# Patient Record
Sex: MALE | Race: WHITE | HISPANIC OR LATINO | Employment: FULL TIME | URBAN - METROPOLITAN AREA
[De-identification: names, ages, dates, MRNs, and addresses within clinical notes are randomized per-mention and may not be internally consistent; named-entity substitution may affect disease eponyms.]

---

## 2017-01-03 ENCOUNTER — TELEPHONE (OUTPATIENT)
Dept: ORTHOPEDICS | Facility: CLINIC | Age: 57
End: 2017-01-03

## 2017-01-04 ENCOUNTER — ANESTHESIA (OUTPATIENT)
Dept: SURGERY | Facility: HOSPITAL | Age: 57
DRG: 462 | End: 2017-01-04
Payer: COMMERCIAL

## 2017-01-04 PROBLEM — M17.11 PRIMARY OSTEOARTHRITIS OF RIGHT KNEE: Status: ACTIVE | Noted: 2017-01-04

## 2017-01-04 PROCEDURE — D9220A PRA ANESTHESIA: Mod: CRNA,,, | Performed by: NURSE ANESTHETIST, CERTIFIED REGISTERED

## 2017-01-04 PROCEDURE — 63600175 PHARM REV CODE 636 W HCPCS: Performed by: NURSE ANESTHETIST, CERTIFIED REGISTERED

## 2017-01-04 PROCEDURE — 27800517 HC TRAY,EPIDURAL-CONTINUOUS: Performed by: ANESTHESIOLOGY

## 2017-01-04 PROCEDURE — 76942 ECHO GUIDE FOR BIOPSY: CPT | Performed by: ANESTHESIOLOGY

## 2017-01-04 PROCEDURE — D9220A PRA ANESTHESIA: Mod: ANES,,, | Performed by: ANESTHESIOLOGY

## 2017-01-04 PROCEDURE — 25000003 PHARM REV CODE 250: Performed by: NURSE PRACTITIONER

## 2017-01-04 PROCEDURE — 64450 NJX AA&/STRD OTHER PN/BRANCH: CPT | Mod: 59,50,, | Performed by: ANESTHESIOLOGY

## 2017-01-04 PROCEDURE — 64448 NJX AA&/STRD FEM NRV NFS IMG: CPT | Mod: 59,50,, | Performed by: ANESTHESIOLOGY

## 2017-01-04 PROCEDURE — 76942 ECHO GUIDE FOR BIOPSY: CPT | Mod: 26,,, | Performed by: ANESTHESIOLOGY

## 2017-01-04 PROCEDURE — 27200750 HC INSULATED NEEDLE/ STIMUPLEX: Performed by: ANESTHESIOLOGY

## 2017-01-04 PROCEDURE — 25000003 PHARM REV CODE 250: Performed by: NURSE ANESTHETIST, CERTIFIED REGISTERED

## 2017-01-04 RX ORDER — FENTANYL CITRATE 50 UG/ML
INJECTION, SOLUTION INTRAMUSCULAR; INTRAVENOUS
Status: DISCONTINUED | OUTPATIENT
Start: 2017-01-04 | End: 2017-01-04

## 2017-01-04 RX ORDER — LIDOCAINE HYDROCHLORIDE AND EPINEPHRINE 15; 5 MG/ML; UG/ML
INJECTION, SOLUTION EPIDURAL
Status: DISCONTINUED | OUTPATIENT
Start: 2017-01-04 | End: 2017-01-04

## 2017-01-04 RX ORDER — MIDAZOLAM HYDROCHLORIDE 1 MG/ML
INJECTION, SOLUTION INTRAMUSCULAR; INTRAVENOUS
Status: DISCONTINUED | OUTPATIENT
Start: 2017-01-04 | End: 2017-01-04

## 2017-01-04 RX ORDER — KETAMINE HCL IN 0.9 % NACL 50 MG/5 ML
SYRINGE (ML) INTRAVENOUS
Status: DISCONTINUED | OUTPATIENT
Start: 2017-01-04 | End: 2017-01-04

## 2017-01-04 RX ORDER — PROPOFOL 10 MG/ML
VIAL (ML) INTRAVENOUS
Status: DISCONTINUED | OUTPATIENT
Start: 2017-01-04 | End: 2017-01-04

## 2017-01-04 RX ORDER — PROPOFOL 10 MG/ML
VIAL (ML) INTRAVENOUS CONTINUOUS PRN
Status: DISCONTINUED | OUTPATIENT
Start: 2017-01-04 | End: 2017-01-04

## 2017-01-04 RX ORDER — ONDANSETRON 2 MG/ML
INJECTION INTRAMUSCULAR; INTRAVENOUS
Status: DISCONTINUED | OUTPATIENT
Start: 2017-01-04 | End: 2017-01-04

## 2017-01-04 RX ORDER — DEXAMETHASONE SODIUM PHOSPHATE 4 MG/ML
INJECTION, SOLUTION INTRA-ARTICULAR; INTRALESIONAL; INTRAMUSCULAR; INTRAVENOUS; SOFT TISSUE
Status: DISCONTINUED | OUTPATIENT
Start: 2017-01-04 | End: 2017-01-04

## 2017-01-04 RX ORDER — SODIUM CHLORIDE 9 MG/ML
INJECTION, SOLUTION INTRAVENOUS CONTINUOUS PRN
Status: DISCONTINUED | OUTPATIENT
Start: 2017-01-04 | End: 2017-01-04

## 2017-01-04 RX ORDER — LIDOCAINE HCL/PF 100 MG/5ML
SYRINGE (ML) INTRAVENOUS
Status: DISCONTINUED | OUTPATIENT
Start: 2017-01-04 | End: 2017-01-04

## 2017-01-04 RX ADMIN — SODIUM CHLORIDE, SODIUM GLUCONATE, SODIUM ACETATE, POTASSIUM CHLORIDE, MAGNESIUM CHLORIDE, SODIUM PHOSPHATE, DIBASIC, AND POTASSIUM PHOSPHATE: .53; .5; .37; .037; .03; .012; .00082 INJECTION, SOLUTION INTRAVENOUS at 02:01

## 2017-01-04 RX ADMIN — Medication 30 MG: at 01:01

## 2017-01-04 RX ADMIN — SODIUM CHLORIDE, SODIUM ACETATE ANHYDROUS, SODIUM GLUCONATE, POTASSIUM CHLORIDE, AND MAGNESIUM CHLORIDE: 526; 222; 502; 37; 30 INJECTION, SOLUTION INTRAVENOUS at 04:01

## 2017-01-04 RX ADMIN — MEPIVACAINE HYDROCHLORIDE 45 MG: 15 INJECTION, SOLUTION EPIDURAL; INFILTRATION at 01:01

## 2017-01-04 RX ADMIN — PROPOFOL 50 MG: 10 INJECTION, EMULSION INTRAVENOUS at 01:01

## 2017-01-04 RX ADMIN — SODIUM CHLORIDE, SODIUM GLUCONATE, SODIUM ACETATE, POTASSIUM CHLORIDE, MAGNESIUM CHLORIDE, SODIUM PHOSPHATE, DIBASIC, AND POTASSIUM PHOSPHATE: .53; .5; .37; .037; .03; .012; .00082 INJECTION, SOLUTION INTRAVENOUS at 01:01

## 2017-01-04 RX ADMIN — ONDANSETRON 4 MG: 2 INJECTION INTRAMUSCULAR; INTRAVENOUS at 01:01

## 2017-01-04 RX ADMIN — LIDOCAINE HYDROCHLORIDE AND EPINEPHRINE 3 ML: 15; 5 INJECTION, SOLUTION EPIDURAL at 03:01

## 2017-01-04 RX ADMIN — PROPOFOL 75 MCG/KG/MIN: 10 INJECTION, EMULSION INTRAVENOUS at 01:01

## 2017-01-04 RX ADMIN — Medication 2 G: at 01:01

## 2017-01-04 RX ADMIN — MEPIVACAINE HYDROCHLORIDE 6 ML/HR: 15 INJECTION, SOLUTION EPIDURAL; INFILTRATION at 03:01

## 2017-01-04 RX ADMIN — SODIUM CHLORIDE, SODIUM ACETATE ANHYDROUS, SODIUM GLUCONATE, POTASSIUM CHLORIDE, AND MAGNESIUM CHLORIDE: 526; 222; 502; 37; 30 INJECTION, SOLUTION INTRAVENOUS at 03:01

## 2017-01-04 RX ADMIN — EPHEDRINE SULFATE 5 MG: 50 INJECTION, SOLUTION INTRAMUSCULAR; INTRAVENOUS; SUBCUTANEOUS at 03:01

## 2017-01-04 RX ADMIN — LIDOCAINE HYDROCHLORIDE 50 MG: 20 INJECTION, SOLUTION INTRAVENOUS at 01:01

## 2017-01-04 RX ADMIN — SODIUM CHLORIDE: 0.9 INJECTION, SOLUTION INTRAVENOUS at 01:01

## 2017-01-04 RX ADMIN — MIDAZOLAM HYDROCHLORIDE 2 MG: 1 INJECTION, SOLUTION INTRAMUSCULAR; INTRAVENOUS at 01:01

## 2017-01-04 RX ADMIN — MIDAZOLAM HYDROCHLORIDE 1 MG: 1 INJECTION, SOLUTION INTRAMUSCULAR; INTRAVENOUS at 02:01

## 2017-01-04 RX ADMIN — FENTANYL CITRATE 50 MCG: 50 INJECTION, SOLUTION INTRAMUSCULAR; INTRAVENOUS at 01:01

## 2017-01-04 RX ADMIN — DEXAMETHASONE SODIUM PHOSPHATE 8 MG: 4 INJECTION, SOLUTION INTRAMUSCULAR; INTRAVENOUS at 01:01

## 2017-01-05 PROCEDURE — 99231 SBSQ HOSP IP/OBS SF/LOW 25: CPT | Mod: ,,, | Performed by: ANESTHESIOLOGY

## 2017-01-06 ENCOUNTER — HOSPITAL ENCOUNTER (INPATIENT)
Facility: HOSPITAL | Age: 57
LOS: 11 days | Discharge: HOME HEALTH | DRG: 561 | End: 2017-01-17
Attending: PHYSICAL MEDICINE & REHABILITATION | Admitting: PHYSICAL MEDICINE & REHABILITATION
Payer: COMMERCIAL

## 2017-01-06 DIAGNOSIS — Z96.653 S/P BILATERAL UNICOMPARTMENTAL KNEE REPLACEMENT: Primary | ICD-10-CM

## 2017-01-06 DIAGNOSIS — Z98.890 S/P ARTHROSCOPY OF KNEE: ICD-10-CM

## 2017-01-06 DIAGNOSIS — M17.12 PRIMARY OSTEOARTHRITIS OF LEFT KNEE: ICD-10-CM

## 2017-01-06 DIAGNOSIS — M17.11 PRIMARY OSTEOARTHRITIS OF RIGHT KNEE: ICD-10-CM

## 2017-01-06 PROBLEM — I10 BENIGN ESSENTIAL HTN: Status: ACTIVE | Noted: 2017-01-06

## 2017-01-06 PROCEDURE — 25000003 PHARM REV CODE 250: Performed by: STUDENT IN AN ORGANIZED HEALTH CARE EDUCATION/TRAINING PROGRAM

## 2017-01-06 PROCEDURE — 99222 1ST HOSP IP/OBS MODERATE 55: CPT | Mod: ,,, | Performed by: PHYSICAL MEDICINE & REHABILITATION

## 2017-01-06 PROCEDURE — 12800000 HC REHAB SEMI-PRIVATE ROOM

## 2017-01-06 PROCEDURE — 99231 SBSQ HOSP IP/OBS SF/LOW 25: CPT | Mod: ,,, | Performed by: ANESTHESIOLOGY

## 2017-01-06 RX ORDER — LEVOTHYROXINE SODIUM 75 UG/1
150 TABLET ORAL
Status: DISCONTINUED | OUTPATIENT
Start: 2017-01-07 | End: 2017-01-17 | Stop reason: HOSPADM

## 2017-01-06 RX ORDER — OXYCODONE HYDROCHLORIDE 5 MG/1
10 TABLET ORAL
Status: DISCONTINUED | OUTPATIENT
Start: 2017-01-06 | End: 2017-01-17 | Stop reason: HOSPADM

## 2017-01-06 RX ORDER — RAMELTEON 8 MG/1
8 TABLET ORAL NIGHTLY PRN
Status: DISCONTINUED | OUTPATIENT
Start: 2017-01-06 | End: 2017-01-10

## 2017-01-06 RX ORDER — ASPIRIN 325 MG
325 TABLET, DELAYED RELEASE (ENTERIC COATED) ORAL 2 TIMES DAILY
Status: DISCONTINUED | OUTPATIENT
Start: 2017-01-06 | End: 2017-01-17 | Stop reason: HOSPADM

## 2017-01-06 RX ORDER — HYDRALAZINE HYDROCHLORIDE 25 MG/1
75 TABLET, FILM COATED ORAL 3 TIMES DAILY
Status: COMPLETED | OUTPATIENT
Start: 2017-01-06 | End: 2017-01-10

## 2017-01-06 RX ORDER — MORPHINE SULFATE 2 MG/ML
2 INJECTION, SOLUTION INTRAMUSCULAR; INTRAVENOUS
Status: DISCONTINUED | OUTPATIENT
Start: 2017-01-06 | End: 2017-01-06 | Stop reason: HOSPADM

## 2017-01-06 RX ORDER — POLYETHYLENE GLYCOL 3350 17 G/17G
17 POWDER, FOR SOLUTION ORAL DAILY
Status: DISCONTINUED | OUTPATIENT
Start: 2017-01-07 | End: 2017-01-17 | Stop reason: HOSPADM

## 2017-01-06 RX ORDER — SIMVASTATIN 20 MG/1
20 TABLET, FILM COATED ORAL NIGHTLY
Status: DISCONTINUED | OUTPATIENT
Start: 2017-01-06 | End: 2017-01-17 | Stop reason: HOSPADM

## 2017-01-06 RX ORDER — ONDANSETRON 8 MG/1
8 TABLET, ORALLY DISINTEGRATING ORAL EVERY 6 HOURS PRN
Status: DISCONTINUED | OUTPATIENT
Start: 2017-01-06 | End: 2017-01-17 | Stop reason: HOSPADM

## 2017-01-06 RX ORDER — POLYETHYLENE GLYCOL 3350 17 G/17G
17 POWDER, FOR SOLUTION ORAL EVERY 12 HOURS PRN
Status: DISCONTINUED | OUTPATIENT
Start: 2017-01-06 | End: 2017-01-17 | Stop reason: HOSPADM

## 2017-01-06 RX ORDER — OXYCODONE HYDROCHLORIDE 5 MG/1
5 TABLET ORAL
Status: DISCONTINUED | OUTPATIENT
Start: 2017-01-06 | End: 2017-01-17 | Stop reason: HOSPADM

## 2017-01-06 RX ORDER — NIFEDIPINE 30 MG/1
60 TABLET, EXTENDED RELEASE ORAL DAILY
Status: DISCONTINUED | OUTPATIENT
Start: 2017-01-07 | End: 2017-01-17 | Stop reason: HOSPADM

## 2017-01-06 RX ORDER — MAG HYDROX/ALUMINUM HYD/SIMETH 200-200-20
15 SUSPENSION, ORAL (FINAL DOSE FORM) ORAL EVERY 6 HOURS PRN
Status: DISCONTINUED | OUTPATIENT
Start: 2017-01-06 | End: 2017-01-17 | Stop reason: HOSPADM

## 2017-01-06 RX ORDER — ADHESIVE BANDAGE
30 BANDAGE TOPICAL DAILY PRN
Status: DISCONTINUED | OUTPATIENT
Start: 2017-01-06 | End: 2017-01-17 | Stop reason: HOSPADM

## 2017-01-06 RX ORDER — ACETAMINOPHEN 325 MG/1
650 TABLET ORAL EVERY 6 HOURS PRN
Status: DISCONTINUED | OUTPATIENT
Start: 2017-01-06 | End: 2017-01-17 | Stop reason: HOSPADM

## 2017-01-06 RX ORDER — AMOXICILLIN 250 MG
1 CAPSULE ORAL 2 TIMES DAILY
Status: DISCONTINUED | OUTPATIENT
Start: 2017-01-06 | End: 2017-01-17 | Stop reason: HOSPADM

## 2017-01-06 RX ORDER — PREGABALIN 75 MG/1
75 CAPSULE ORAL NIGHTLY
Status: DISCONTINUED | OUTPATIENT
Start: 2017-01-06 | End: 2017-01-17 | Stop reason: HOSPADM

## 2017-01-06 RX ORDER — FAMOTIDINE 20 MG/1
20 TABLET, FILM COATED ORAL 2 TIMES DAILY
Status: DISCONTINUED | OUTPATIENT
Start: 2017-01-06 | End: 2017-01-17 | Stop reason: HOSPADM

## 2017-01-06 RX ORDER — BISACODYL 10 MG
10 SUPPOSITORY, RECTAL RECTAL DAILY PRN
Status: DISCONTINUED | OUTPATIENT
Start: 2017-01-06 | End: 2017-01-17 | Stop reason: HOSPADM

## 2017-01-06 RX ORDER — MORPHINE SULFATE 2 MG/ML
2 INJECTION, SOLUTION INTRAMUSCULAR; INTRAVENOUS EVERY 4 HOURS PRN
Status: DISCONTINUED | OUTPATIENT
Start: 2017-01-06 | End: 2017-01-06 | Stop reason: HOSPADM

## 2017-01-06 RX ORDER — OXYCODONE HYDROCHLORIDE 5 MG/1
15 TABLET ORAL
Status: DISCONTINUED | OUTPATIENT
Start: 2017-01-06 | End: 2017-01-17 | Stop reason: HOSPADM

## 2017-01-06 RX ORDER — NALOXONE HCL 0.4 MG/ML
0.02 VIAL (ML) INJECTION
Status: DISCONTINUED | OUTPATIENT
Start: 2017-01-06 | End: 2017-01-17 | Stop reason: HOSPADM

## 2017-01-06 RX ADMIN — HYDRALAZINE HYDROCHLORIDE 75 MG: 25 TABLET ORAL at 09:01

## 2017-01-06 RX ADMIN — PREGABALIN 75 MG: 75 CAPSULE ORAL at 08:01

## 2017-01-06 RX ADMIN — OXYCODONE HYDROCHLORIDE 10 MG: 5 TABLET ORAL at 05:01

## 2017-01-06 RX ADMIN — STANDARDIZED SENNA CONCENTRATE AND DOCUSATE SODIUM 1 TABLET: 8.6; 5 TABLET, FILM COATED ORAL at 08:01

## 2017-01-06 RX ADMIN — SIMVASTATIN 20 MG: 20 TABLET, FILM COATED ORAL at 08:01

## 2017-01-06 RX ADMIN — FAMOTIDINE 20 MG: 20 TABLET, FILM COATED ORAL at 08:01

## 2017-01-06 RX ADMIN — ASPIRIN 325 MG: 325 TABLET, DELAYED RELEASE ORAL at 08:01

## 2017-01-06 NOTE — IP AVS SNAPSHOT
[place] [address] [place] [address]  Phone: [phone]           I have received a copy of my After Visit Summary and discharge instructions from [facility].    INSTRUCTIONS RECEIVED AND UNDERSTOOD BY:                     Patient/Patient Representative: ________________________________________________________________     Date/Time: ________________________________________________________________                     Instructions Given By: ________________________________________________________________     Date/Time: ________________________________________________________________

## 2017-01-06 NOTE — PLAN OF CARE
Problem: Patient Care Overview  Goal: Plan of Care Review  Outcome: Ongoing (interventions implemented as appropriate)  Reviewed POC    Problem: Fall Risk (Adult)  Goal: Absence of Falls  Patient will demonstrate the desired outcomes by discharge/transition of care.   Outcome: Ongoing (interventions implemented as appropriate)  Calls for assistance    Problem: Pain, Acute (Adult)  Goal: Acceptable Pain Control/Comfort Level  Patient will demonstrate the desired outcomes by discharge/transition of care.  Outcome: Ongoing (interventions implemented as appropriate)  Pain well controlled with medication

## 2017-01-06 NOTE — H&P
"[facility]  History & Physical    Subjective:      Doug Garibay is a 56-year-old male with PMHx of HTN, HLD, JUAN RAMON, arthritis, gout, and hypothyroidism. Patient presented to ortho clinic on 12/28/16 for bilateral knee pain unrelieved by conservative/non-operative measures. Decision was made to undergo bilateral knee replacements. Admitted [place] [facility] [place] 1/4/17 for bilateral TKAs done on the day of admission.  The pt's postop course was cx by impaired mobility and ability to perform ADLs including sit-->stand MXA, T/Fs MXA, gait 8' MDA, UB dressing MDA, LB dressing MXA.  He was admitted to [facility] to address the mobility and ADL impairments.  His CC is "walking".      Past Medical History   Diagnosis Date    Arthritis     Gout     High cholesterol     Hypertension     Hypothyroidism     Thyroid disease      Past Surgical History   Procedure Laterality Date    Thyroidectomy       1990's    Parathyroidectomy       not sure of date, was done here    Tonsillectomy       Family History   Problem Relation Age of Onset    Stroke Mother     Hyperlipidemia Mother     Hypertension Mother     Stroke Father     Hyperlipidemia Father     Hypertension Father      Social History   Substance Use Topics    Smoking status: Never Smoker    Smokeless tobacco: Never Used    Alcohol use No       PTA Medications   Medication Sig    acetaminophen (TYLENOL) 500 MG tablet Take 500 mg by mouth every 6 (six) hours as needed for Pain.    aspirin (ECOTRIN) 81 MG EC tablet Take 81 mg by mouth once daily.    aspirin 325 MG tablet Take 1 tablet (325 mg total) by mouth 2 (two) times daily.    cholecalciferol, vitamin D3, (VITAMIN D3) 5,000 unit Tab Take 5,000 Units by mouth once daily.    docusate sodium 100 mg capsule Take 100 mg by mouth 2 (two) times daily.    ferrous sulfate 325 (65 FE) MG EC tablet Take 325 mg by mouth 3 (three) times daily with meals.    hydrALAZINE (APRESOLINE) 50 MG tablet Take 75 mg by " mouth 3 (three) times daily.     levothyroxine (SYNTHROID) 150 MCG tablet Take 150 mcg by mouth once daily.    nifedipine (ADALAT CC) 60 MG TbSR Take 60 mg by mouth once daily.     ondansetron (ZOFRAN-ODT) 4 MG TbDL Take 1 tablet (4 mg total) by mouth every 8 (eight) hours as needed.    oxycodone-acetaminophen (PERCOCET)  mg per tablet Take 1 tablet by mouth every 4 (four) hours as needed for Pain.    RUTIN/HESP/BIOFLAV/C/HERB#196 (BIOFLEX ORAL) Take 1 tablet by mouth once daily.     simvastatin (ZOCOR) 20 MG tablet TAKE ONE TABLET BY MOUTH IN THE EVENING     Review of patient's allergies indicates:  No Known Allergies     Review of Systems   Eyes: Negative for blurred vision.   Respiratory: Negative for cough.    Cardiovascular: Negative for chest pain.   Gastrointestinal: Negative for nausea and vomiting.   Genitourinary: Negative for dysuria.   Musculoskeletal: Positive for joint pain. Negative for myalgias.   Skin: Negative.    Neurological: Positive for weakness. Negative for dizziness, sensory change and headaches.   Psychiatric/Behavioral: Negative for depression. The patient has insomnia.        Objective:      Vital Signs (Most Recent)       Vital Signs Range (Last 24H):  Temp:  [96 °F (35.6 °C)-99.3 °F (37.4 °C)]   Pulse:  []   Resp:  [14-18]   BP: (154-170)/(81-91)   SpO2:  [94 %-95 %]     Physical Exam   Constitutional: He is oriented to person, place, and time. He appears well-nourished.   Eyes: EOM are normal. Pupils are equal, round, and reactive to light.   Neck: Normal range of motion. Neck supple.   Cardiovascular: Normal rate.    Pulmonary/Chest: Effort normal.   Abdominal: Soft.   Musculoskeletal:   BUEs AROM WNL  BLEs AROM diminished at the knees and with HF   Neurological: He is oriented to person, place, and time.   BUEs 5/5  BLEs 4/5 HF, 4+/5 HE, 4/5 KF/KE, 5/5 DF/PF   Skin: No rash noted.   Psychiatric: He has a normal mood and affect.       Data Review:    CBC:   Lab  Results   Component Value Date    WBC 8.50 12/20/2016    RBC 5.06 12/20/2016    HGB 14.2 12/20/2016    HCT 43.3 12/20/2016     12/20/2016     BMP:   Lab Results   Component Value Date     (H) 01/04/2017     01/04/2017    K 5.1 01/04/2017     01/04/2017    CO2 21 (L) 01/04/2017    BUN 25 (H) 01/04/2017    CREATININE 1.6 (H) 01/04/2017    CALCIUM 9.0 01/04/2017     Coagulation:   Lab Results   Component Value Date    INR 0.9 12/20/2016        Assessment:    1.  S/P bilateral TKAs with residual impaired mobility and ability to perform ADLs.  I will order PT for the mobility impairments, OT for ADL deficits, RT and  for community reintegration and Rehab nursing for education.  Pain control is adequate according to the pt.   2.  HTN -- monitor  3.  CKD -- monitor    Plan:    PT/OT/RT/SS/Rehab Nursing  ELOS 2 weeks    The patient will be admitted for inpatient comprehensive interdisciplinary rehabilitation to address the impairments and medical conditions listed above while assessing equipment needs and compensatory strategies, with coordinated interdisciplinary services that will include physical therapy, occupational therapy, and close monitoring and treatment with 24-hour rehabilitative nursing. This interdisciplinary program will be performed under the direction of a physiatrist.     I have had the opportunity to examine the patient within 24 hours of admission and have reviewed the Pre-Admission Assessment and find it consistent with my examination and evaluation of the patient. I confirm that this patient is appropriate for admission and treatment in this inpatient rehabilitation hospital, needs intense interdisciplinary rehabilitation care under my direction and is expected to achieve meaningful goals within a reasonable period of time that are consistent with the planned discharge disposition.

## 2017-01-06 NOTE — IP AVS SNAPSHOT
[facility]  [address]  [facility] [address]  Phone: [phone]           Patient Discharge Instructions     Our goal is to set you up for success. This packet includes information on your condition, medications, and your home care. It will help you to care for yourself so you don't get sicker and need to go back to the hospital.     Please ask your nurse if you have any questions.        There are many details to remember when preparing to leave the hospital. Here is what you will need to do:    1. Take your medicine. If you are prescribed medications, review your Medication List in the following pages. You may have new medications to  at the pharmacy and others that you'll need to stop taking. Review the instructions for how and when to take your medications. Talk with your doctor or nurses if you are unsure of what to do.     2. Go to your follow-up appointments. Specific follow-up information is listed in the following pages. Your may be contacted by a transition nurse or clinical provider about future appointments. Be sure we have all of the phone numbers to reach you, if needed. Please contact your provider's office if you are unable to make an appointment.     3. Watch for warning signs. Your doctor or nurse will give you detailed warning signs to watch for and when to call for assistance. These instructions may also include educational information about your condition. If you experience any of warning signs to your health, call your doctor.               [facility]  Unless otherwise directed by your provider, please contact [facility], our nurse care line that is available for 24/7 assistance.     [phone] (toll-free)    Registered nurses in the [facility] provide clinical advisement, health education, appointment booking, and other advisory services.                    ** Verify the list of medication(s) below is accurate and up  to date. Carry this with you in case of emergency. If your medications have changed, please notify your healthcare provider.             Medication List      START taking these medications        Additional Info    Begin Date AM Noon PM Bedtime    metoprolol succinate 100 MG 24 hr tablet   Commonly known as:  TOPROL-XL   Quantity:  30 tablet   Refills:  3   Dose:  100 mg    Last time this was given:  100 mg on 1/16/2017 10:51 PM   Instructions:  Take 1 tablet (100 mg total) by mouth every evening.                               polyethylene glycol 17 gram Pwpk   Commonly known as:  GLYCOLAX   Quantity:  30 each   Refills:  0   Dose:  17 g    Last time this was given:  17 g on 1/17/2017  7:09 AM   Instructions:  Take 17 g by mouth once daily.                               senna-docusate 8.6-50 mg 8.6-50 mg per tablet   Commonly known as:  PERICOLACE   Refills:  0   Dose:  1 tablet    Last time this was given:  1 tablet on 1/17/2017  7:09 AM   Instructions:  Take 1 tablet by mouth 2 (two) times daily.                                  vitamin D 1000 units Tab   Refills:  0   Dose:  1000 Units    Last time this was given:  1,000 Units on 1/17/2017  7:09 AM   Instructions:  Take 1 tablet (1,000 Units total) by mouth once daily.                                 CHANGE how you take these medications        Additional Info    Begin Date AM Noon PM Bedtime    * aspirin 325 MG tablet   Quantity:  36 tablet   Refills:  0   Dose:  325 mg   What changed:  additional instructions   Notes to Patient:  CARDIOVASCULAR HEALTH/BLOOD THINNING    Instructions:  Take 1 tablet (325 mg total) by mouth 2 (two) times daily. (End 2/4/17)            UNTIL 2/4/17                 UNTIL 2/4/17       * aspirin 81 MG EC tablet   Commonly known as:  ECOTRIN   Refills:  0   Dose:  81 mg   What changed:  additional instructions   Notes to Patient:  CARDIOVASCULAR HEALTH/BLOOD THINNING    Start Date:  2/5/2017   Last time this was given:  325 mg on  1/17/2017  7:09 AM   Instructions:  Take 1 tablet (81 mg total) by mouth once daily. HOLD UNTIL ASPIRIN 325 MG TWICE DAILY IS COMPLETE; RESUME 2/5/17            BEGIN 2/5/17                   ferrous sulfate 325 (65 FE) MG EC tablet   Refills:  0   Dose:  325 mg   What changed:  when to take this   Notes to Patient:  TREATS ANEMIA    Last time this was given:  325 mg on 1/17/2017  7:09 AM   Instructions:  Take 1 tablet (325 mg total) by mouth once daily.                               oxycodone-acetaminophen  mg per tablet   Commonly known as:  PERCOCET   Quantity:  70 tablet   Refills:  0   Dose:  1 tablet   What changed:  when to take this   Notes to Patient:  MODERATE PAIN    Instructions:  Take 1 tablet by mouth 3 (three) times daily as needed for Pain.                            vit F-btvzujj-ysuo-rutin-hb196 847-98-44-40 mg Tab   Commonly known as:  BIOFLEX   Refills:  0   Dose:  1 tablet   What changed:    - medication strength  - additional instructions    Instructions:  Take 1 tablet by mouth once daily. HOLD UNTIL TOLD TO RESUME BY PHYSICIAN                            * Notice:  This list has 2 medication(s) that are the same as other medications prescribed for you. Read the directions carefully, and ask your doctor or other care provider to review them with you.      CONTINUE taking these medications        Additional Info    Begin Date AM Noon PM Bedtime    acetaminophen 500 MG tablet   Commonly known as:  TYLENOL   Refills:  0   Dose:  500 mg   Notes to Patient:  MILD PAIN    Instructions:  Take 500 mg by mouth every 6 (six) hours as needed for Pain.                            levothyroxine 150 MCG tablet   Commonly known as:  SYNTHROID   Refills:  0   Dose:  150 mcg   Notes to Patient:  LOW THYROID    Last time this was given:  150 mcg on 1/17/2017  7:10 AM   Instructions:  Take 150 mcg by mouth once daily.                               nifedipine 60 MG Tbsr   Commonly known as:  ADALAT CC    Refills:  0   Dose:  60 mg   Indications:  Hypertension   Notes to Patient:  BLOOD PRESSURE    Instructions:  Take 60 mg by mouth once daily.                               simvastatin 20 MG tablet   Commonly known as:  ZOCOR   Quantity:  30 tablet   Refills:  12   Notes to Patient:  CHOLESTEROL     Last time this was given:  20 mg on 1/16/2017 10:04 PM   Instructions:  TAKE ONE TABLET BY MOUTH IN THE EVENING                                 STOP taking these medications     ondansetron 4 MG Tbdl   Commonly known as:  ZOFRAN-ODT   Notes to Patient:  NAUSEA            Where to Get Your Medications      These medications were sent to [id number] [place] [place] - [address] [id number] [facility] [address]     Phone:  [phone]     aspirin 325 MG tablet    metoprolol succinate 100 MG 24 hr tablet    oxycodone-acetaminophen  mg per tablet    polyethylene glycol 17 gram Pwpk         You can get these medications from any pharmacy     You don't need a prescription for these medications     aspirin 81 MG EC tablet    ferrous sulfate 325 (65 FE) MG EC tablet    senna-docusate 8.6-50 mg 8.6-50 mg per tablet    vitamin D 1000 units Tab         Information about where to get these medications is not yet available     ! Ask your nurse or doctor about these medications     vit [id number]-zxra-rutin-hb196 106-41-34-40 mg Tab                  Please bring to all follow up appointments:    1. A copy of your discharge instructions.  2. All medicines you are currently taking in their original bottles.  3. Identification and insurance card.    Please arrive 15 minutes ahead of scheduled appointment time.    Please call 24 hours in advance if you must reschedule your appointment and/or time.        Your Scheduled Appointments     Jan 18, 2017  1:30 PM CST   Post OP with [facility] ([address] )    [address]  [facility] [address] [phone]         "      Referrals     Future Orders    Referral to Home health         Discharge Instructions     Future Orders    Activity as tolerated     COMMODE FOR HOME USE     Questions:    Type:  Standard    Height:  5' 10" (1.778 m)    Weight:  106.8 kg (235 lb 8 oz)    Does patient have medical equipment at home?:  cane, straight    shower chair    Length of need (1-99 months):  99    Vendor:  [facility]    Expected Date of Delivery:  1/17/2017    Expected Time of Delivery:      Diet general     Questions:    Total calories:      Fat restriction, if any:      Protein restriction, if any:      Na restriction, if any:  3gNa    Fluid restriction:      Additional restrictions:      WALKER FOR HOME USE     Questions:    Type of Walker:  Adult (5'4"-6'6")    With wheels?:  Yes    Height:  5' 10" (1.778 m)    Weight:  106.8 kg (235 lb 8 oz)    Length of need (1-99 months):  99    Platform attachment:      Accessories/Other:      Assistance needed:      Does patient have medical equipment at home?:  cane, straight    shower chair    Please check all that apply:  Patient's condition impairs ambulation.    Walker will be used for gait training.    Vendor:  [facility]    Expected Date of Delivery:  1/17/2017    Expected Time of Delivery:          Discharge Instructions       To clarify aspirin.  Take 325mg twice a day until 2/4/17.  On 2/4/17 resume 81mg per day routinely.      [facility] - [phone]      Primary Diagnosis     Your primary diagnosis was:  S/P Bilateral Unicompartmental Knee Replacement      Admission Information     Date & Time Provider Department CSN    1/6/2017  3:21 PM Arnold Tejeda MD [facility] [id number]      Care Providers     Provider Role Specialty Primary office phone    Arnold Tejeda MD Attending Provider Physical Medicine and Rehabilitation [phone]      Your Vitals Were     BP Pulse Temp Resp Height Weight    153/80 (BP Location: Left arm, Patient Position: " "Lying, BP Method: Automatic) 71 98.8 °F (37.1 °C) (Oral) 16 5' 10" (1.778 m) 106.8 kg (235 lb 8 oz)    SpO2 BMI             92% 33.79 kg/m2         Recent Lab Values     No lab values to display.      Allergies as of 1/17/2017     No Known Allergies      Advance Directives     An advance directive is a document which, in the event you are no longer able to make decisions for yourself, tells your healthcare team what kind of treatment you do or do not want to receive, or who you would like to make those decisions for you.  If you do not currently have an advance directive, [facility] encourages you to create one.  For more information call:  [phone]), [phone] ([phone]),  or log on to [email or web address].        Language Assistance Services     ATTENTION: Language assistance services are available, free of charge. Please call [phone].      ATENCIÓN: Si habla español, tiene a figueroa disposición servicios gratuitos de asistencia lingüística. Llame al [phone].     CHÚ Ý: N?u b?n nói Ti?ng Vi?t, có các d?ch v? h? tr? ngôn ng? mi?n phí dành cho b?n. G?i s? [phone].        Chronic Kindey Disease Education             [facility] Sign-Up     Activating your [facility] account is as easy as 1-2-3!     1) Visit [email or web address], select Sign Up Now, enter this activation code and your date of birth, then select Next.  [email or web address]  Expires: 2/3/2017 10:17 AM      2) Create a username and password to use when you visit [facility] in the future and select a security question in case you lose your password and select Next.    3) Enter your e-mail address and click Sign Up!    Additional Information  If you have questions, please e-mail [email or web address] or call [phone] to talk to our [facility] staff. Remember, [facility] is NOT to be used for urgent needs. For medical emergencies, dial 911.          [facility] complies with applicable Federal civil rights " laws and does not discriminate on the basis of race, color, national origin, age, disability, or sex.

## 2017-01-07 PROCEDURE — 97530 THERAPEUTIC ACTIVITIES: CPT

## 2017-01-07 PROCEDURE — 99232 SBSQ HOSP IP/OBS MODERATE 35: CPT | Mod: ,,, | Performed by: PHYSICAL MEDICINE & REHABILITATION

## 2017-01-07 PROCEDURE — 97110 THERAPEUTIC EXERCISES: CPT

## 2017-01-07 PROCEDURE — 25000003 PHARM REV CODE 250: Performed by: STUDENT IN AN ORGANIZED HEALTH CARE EDUCATION/TRAINING PROGRAM

## 2017-01-07 PROCEDURE — 97162 PT EVAL MOD COMPLEX 30 MIN: CPT

## 2017-01-07 PROCEDURE — 97166 OT EVAL MOD COMPLEX 45 MIN: CPT

## 2017-01-07 PROCEDURE — 12800000 HC REHAB SEMI-PRIVATE ROOM

## 2017-01-07 RX ADMIN — OXYCODONE HYDROCHLORIDE 15 MG: 5 TABLET ORAL at 12:01

## 2017-01-07 RX ADMIN — FAMOTIDINE 20 MG: 20 TABLET, FILM COATED ORAL at 08:01

## 2017-01-07 RX ADMIN — OXYCODONE HYDROCHLORIDE 15 MG: 5 TABLET ORAL at 07:01

## 2017-01-07 RX ADMIN — FAMOTIDINE 20 MG: 20 TABLET, FILM COATED ORAL at 07:01

## 2017-01-07 RX ADMIN — STANDARDIZED SENNA CONCENTRATE AND DOCUSATE SODIUM 1 TABLET: 8.6; 5 TABLET, FILM COATED ORAL at 08:01

## 2017-01-07 RX ADMIN — STANDARDIZED SENNA CONCENTRATE AND DOCUSATE SODIUM 1 TABLET: 8.6; 5 TABLET, FILM COATED ORAL at 07:01

## 2017-01-07 RX ADMIN — HYDRALAZINE HYDROCHLORIDE 75 MG: 25 TABLET ORAL at 05:01

## 2017-01-07 RX ADMIN — SIMVASTATIN 20 MG: 20 TABLET, FILM COATED ORAL at 08:01

## 2017-01-07 RX ADMIN — HYDRALAZINE HYDROCHLORIDE 75 MG: 25 TABLET ORAL at 08:01

## 2017-01-07 RX ADMIN — LEVOTHYROXINE SODIUM 150 MCG: 75 TABLET ORAL at 07:01

## 2017-01-07 RX ADMIN — ASPIRIN 325 MG: 325 TABLET, DELAYED RELEASE ORAL at 08:01

## 2017-01-07 RX ADMIN — POLYETHYLENE GLYCOL 3350 17 G: 17 POWDER, FOR SOLUTION ORAL at 07:01

## 2017-01-07 RX ADMIN — HYDRALAZINE HYDROCHLORIDE 75 MG: 25 TABLET ORAL at 01:01

## 2017-01-07 RX ADMIN — OXYCODONE HYDROCHLORIDE 15 MG: 5 TABLET ORAL at 05:01

## 2017-01-07 RX ADMIN — ASPIRIN 325 MG: 325 TABLET, DELAYED RELEASE ORAL at 07:01

## 2017-01-07 RX ADMIN — PREGABALIN 75 MG: 75 CAPSULE ORAL at 08:01

## 2017-01-07 RX ADMIN — NIFEDIPINE 60 MG: 30 TABLET, FILM COATED, EXTENDED RELEASE ORAL at 07:01

## 2017-01-07 NOTE — PROGRESS NOTES
Occupational Therapy   FIM scores    Doug Garibay  MRN: [id number]  Room/Bed: E255/E255 B       01/07/17 0900   Transfers   Bed/Chair/WC 1   Self Care   Eating 7   Grooming 5   Bathing 4   Dressing-Upper 5   Dressing-Lower 3   Toileting 4   Communication   Comprehension 7   Mode B   Expression 7   Mode B   Social Cognition   Social Interaction 7   Problem Solving 7   Memory 6       MONA Garrison  1/7/2017

## 2017-01-07 NOTE — PROGRESS NOTES
Occupational Therapy   Evaluation /Treatment  Doug Garibay  MRN: [id number]  Room/Bed: E255/E255 B     01/07/17 0730   OT Time Calculation   OT Start Time 0730   OT Stop Time 0900   OT Total Time (min) 90 min   General   OT Date of Treatment 01/07/17   Chart Reviewed Yes   Onset of Illness/Injury or Date of Surgery 01/04/17   Diagnosis B TKA   Additional Pertinent History Past Medical History   Diagnosis Date    Arthritis     Gout     High cholesterol     Hypertension     Hypothyroidism     Thyroid disease      Past Surgical History   Procedure Laterality Date    Thyroidectomy       1990's    Parathyroidectomy       not sure of date, was done here    Tonsillectomy          Date Referred to OT 01/06/17   Referring Physician Dr. Tejeda   Family/Caregiver Present No   Patient Found (position) Supine in bed   Pt found with peripheral IV   Precautions   General Precautions fall   Orthopedic Yes   RLE Weight Bearing WBAT   LLE Weight Bearing WBAT   BADL History   Bed Mobility/Transfers independent   Grooming independent   Bathing independent   Upper Body Dressing independent   Lower Body Dressing independent   Toileting independent   IADL History   Driving License Yes   Occupation Retired  (recently retired in December)   Type of Occupation Teacher, , cross country.     Leisure and Hobbies painting   Living Environment   Lives With spouse   Living Arrangements house   Home Accessibility stairs to enter home;stairs within home   Number of Stairs to Enter Home 1   Number of Stairs Within Home 20   Stair Railings at Home outside, present on left side   Living Environment Comment Pt. lives with spouse in a 2 story home, 1 step to enter home, 20 steps inside the home.  Prior to admit, pt. was (I) with all ADLs, gait.  Pt. recently using a SC.  Pt. just retired from teaching in December; spouse still works as a teacher.  Pt. will be home alone during the day with friends checking on him.  Pt. has bed  "downstairs, 1/2 bath downstairs.     Equipment Currently Used at Home cane, straight  (Borrowed from mother- w/c, TTB, shower chair)   Subjective   Patient states "Let me try it myself.  This is better than I did yesterday. "    Pain/Comfort   Pain Rating 2/10   Pain Comment Pt. reports having a high pain tolerance.     Cognitive Status   Level of Consciousness (AVPU) alert   Orientation oriented x 4   Able to Follow Commands (Communication) WFL   Speech clear/fluent;follows commands   Mood/Behavior calm;cooperative   Range of Motion (ROM)   Range of Motion Examination bilateral upper extremity ROM was WFL   Manual Muscle Testing (MMT)   Manual Muscle Testing Results (BUE strength is WFL)   Sit to Stand   Sit <> Stand Assistance Maximum Assistance   Sit <> Stand Assistive Device No Assistive Device   Trials/Comments Mod A for sit to stand from EOB with RW.    Stand to Sit   Assistance Maximum Assistance   Assistive Device No Assistive Device   Trials/Comments Mod A to sit with RW   Bed to Chair   Bed <> Chair Technique Stand Pivot   Bed <> Chair Transfer Assistance Total Assistance  (Pt. was unable to stand, t/f without AD)   Bed <> Chair Assistive Device No Assistive Device   Trials/Comments Max A with t/f from bed with RW   Feeding   Feeding Level of Assistance Independent   Grooming   Grooming Level of Assistance (Set-up assist)   Grooming Where Assessed Wheelchair;Sitting sinkside   Bathing   Bathing Level of Assistance Minimum assistance   Bathing Where Assessed Edge of bed   Bathing Comments Min A for thoroughness with bathing feet; Min A with balance in stance   UE Dressing   UE Dressing Level of Assistance (Set-up assist to don pullover shirt)   UE Dressing Where Assessed Edge of bed   LE Dressing   LE Dressing  Level of Assistance Moderate assistance  (overall- Pt. completes 5/7 steps)   LE Dressing Level of Assistance Minimum assistance  (Steadying assist during stance phase to don pants)   Sock Level of " Assistance Minimum assistance  (assist to thread over B toes; pt. completes pulling up)   Shoe Level of Assistance Stand by assistance  (with increased time and effort)   LE Dressing Where Assessed Wheelchair   LE Dressing Comments Increased time and effort with donning socks and shoes   Toileting   Toileting Level of Assistance Minimum assistance   Toileting Comments Assist with balance during stance phase   Treatment   Treatment OT eval completed.  Pt. educated in the Role of OT and the POC.  Educated in t/f techniques- sit<>stand, hand placement with t/fs.  Participated in BUE ther-ex- rickshaw with 30#- 20 x 5 reps; UBE in sitting x 10 min. with moderate resistance.     Activity Tolerance   Activity Tolerance Patient tolerated treatment well   After Treatment   Patient Position After Treatment Seated in wheelchair   Assessment   Prognosis Good   Problem List Decreased Self Care skills;Decreased upper extremity strength;Decreased endurance;Decreased functional mobility;Decreased IADLs   Assessment OT eval completed.  Pt. with good effort and participation in OT session.  Pain was tolerable throughout session.  Pt. has the most difficulty with sit to stand t/fs.  Pt. will benefit from inpatient Rehab to increase (I) and safety with ADLs, t/fs, functional mobility.     Level of Motivation/Participation Good    Short Term Goals   Time For Goal Achievement 7 days   Pt Will Perform Supine To Sit Modified Independently;New goal   Supine to Sit - Met/Not Met Not Met   Pt Will Transfer Sit to Stand With minimal assist;With RW;New goal   Transfer Sit to stand - Met/Not Met Not Met   Pt Will Transfer Bed/Chair With minimal assist;With RW;New goal   Transfer Bed/Chair - Met/Not Met Not Met   Pt Will Transfer To Bedside Commode With minimal assist;With RW;New goal   Transfer Bedside Commode -Met/Not Met Not Met   Pt Will Transfer To Shower With minimal assist;With RW;New goal   Transfer to Shower-Met/Not Met Not Met   Pt  Will Perform Bathing With stand by assistance;In wheelchair;New goal   Bathing - Met/Not Met Not Met   Pt Will Perform UE Dressing Independently;New goal   UE Dressing - Met/Not Met Not Met   Pt Will Perform LE Dressing With minimal assistance;With adaptive equipment;New goal   LE Dressing - Met/Not Met Not Met   Long Term Goals   Time For Goal Achievement (10-12 days)   Pt Will Perform Supine To Sit Modified Independently;New goal   Supine to Sit - Met/Not Met Not Met   Pt Will Transfer Sit to Stand Supervision;With RW;New goal   Transfer Sit to stand - Met/Not Met Not Met   Pt Will Transfer Bed/Chair Supervision;With RW;New goal   Transfer Bed/Chair - Met/Not Met Not Met   Pt Will Transfer To Bedside Commode With supervision;With RW;New goal   Pt Will Transfer To Shower New goal   Pt Will Perform Eating Independently;New goal   Eating - Met/Not Met Met   Pt Will Perform Grooming Independently;In wheelchair;New goal   Grooming - Met/Not Met Not Met   Pt Will Perform Bathing With supervision;In wheelchair;New goal   Bathing - Met/Not Met Not Met   Pt Will Perform UE Dressing Independently;In wheelchair;New goal   UE Dressing - Met/Not Met Not Met   Pt Will Perform LE Dressing With supervision;With adaptive equipment;New goal   LE Dressing - Met/Not Met Not Met   Pt Will Perform Toileting With supervision;With bedside commode;New goal   Toileting-Met/Not Met Not Met   Discharge Recommendations   Equipment Needed After Discharge 3-in-1 commode;shower chair;walker, rolling   Plan   Plan Self care retraining;Functional transfer training;UE thereex;Equipment Training;Safety training;Functional endurance training;Patient education;Postural control;AROM;Functional Standing Activities   Therapy Frequency 2 times/day;Monday-Friday;Saturday or Sunday   Occupational Therapy Follow-up   OT Follow-up? Yes   Treatment/Billable Minutes   Evaluation 60   Self Care/Home Management 10   Therapeutic Exercise 20   Total Time 90    Expression:  - Expresses Complex / abstract ideas. (7)  Social Interaction:  - Interacts appropriately with others - No medications needed. Patient asleep all shift (7)  .Problem Solving:  - Solves complex problems (consistently) Patient asleep all shift. Ex: I would like to use the Home Health service I used before.  (7)   Memory:  -  Recognizes, recalls, or executes 3 steps of 3 step request with extra time/devic (memory book, calendar to remember)  (6)    Comprehension:  Complex= humor, finances, rationale for medical treatment(hip precautions, pressure relief)  Basic= pain, hunger, thirst, bathroom needs, cold, nutrition, sleep    · Understands complex/abstract conversation/directions. (7)    MONA Garrison  1/7/2017

## 2017-01-07 NOTE — H&P
[facility]  Progress note    Subjective:      Doug Garibay is a 56-year-old male with PMHx of HTN, HLD, JUAN RAMON, arthritis, gout, and hypothyroidism. No issues overnight.  He complains of lack of sleep, but sleeping during the day per family.  He reports improved function today vs tyesterday in therapy      Past Medical History   Diagnosis Date    Arthritis     Gout     High cholesterol     Hypertension     Hypothyroidism     Thyroid disease      Past Surgical History   Procedure Laterality Date    Thyroidectomy       1990's    Parathyroidectomy       not sure of date, was done here    Tonsillectomy       Family History   Problem Relation Age of Onset    Stroke Mother     Hyperlipidemia Mother     Hypertension Mother     Stroke Father     Hyperlipidemia Father     Hypertension Father      Social History   Substance Use Topics    Smoking status: Never Smoker    Smokeless tobacco: Never Used    Alcohol use No       PTA Medications   Medication Sig    aspirin (ECOTRIN) 81 MG EC tablet Take 81 mg by mouth once daily.    cholecalciferol, vitamin D3, (VITAMIN D3) 5,000 unit Tab Take 5,000 Units by mouth once daily.    ferrous sulfate 325 (65 FE) MG EC tablet Take 325 mg by mouth 3 (three) times daily with meals.    hydrALAZINE (APRESOLINE) 50 MG tablet Take 75 mg by mouth 3 (three) times daily.     levothyroxine (SYNTHROID) 150 MCG tablet Take 150 mcg by mouth once daily.    nifedipine (ADALAT CC) 60 MG TbSR Take 60 mg by mouth once daily.     ondansetron (ZOFRAN-ODT) 4 MG TbDL Take 1 tablet (4 mg total) by mouth every 8 (eight) hours as needed.    oxycodone-acetaminophen (PERCOCET)  mg per tablet Take 1 tablet by mouth every 4 (four) hours as needed for Pain.    RUTIN/HESP/BIOFLAV/C/HERB#196 (BIOFLEX ORAL) Take 1 tablet by mouth once daily.     acetaminophen (TYLENOL) 500 MG tablet Take 500 mg by mouth every 6 (six) hours as needed for Pain.    aspirin 325 MG tablet  Take 1 tablet (325 mg total) by mouth 2 (two) times daily.    docusate sodium 100 mg capsule Take 100 mg by mouth 2 (two) times daily.    simvastatin (ZOCOR) 20 MG tablet TAKE ONE TABLET BY MOUTH IN THE EVENING     Review of patient's allergies indicates:  No Known Allergies     Review of Systems   Eyes: Negative for blurred vision.   Respiratory: Negative for cough.    Cardiovascular: Negative for chest pain.   Gastrointestinal: Negative for nausea and vomiting.   Genitourinary: Negative for dysuria.   Musculoskeletal: Positive for joint pain. Negative for myalgias.   Skin: Negative.    Neurological: Positive for weakness. Negative for dizziness, sensory change and headaches.   Psychiatric/Behavioral: Negative for depression. The patient has insomnia.        Objective:      Vital Signs (Most Recent)  Temp: 98.3 °F (36.8 °C) (01/07/17 0656)  Pulse: 97 (01/07/17 0656)  Resp: 18 (01/07/17 0656)  BP: (!) 149/81 (01/07/17 0800)  SpO2: 96 % (01/07/17 0656)    Vital Signs Range (Last 24H):  Temp:  [98.1 °F (36.7 °C)-98.7 °F (37.1 °C)]   Pulse:  []   Resp:  [16-18]   BP: (149-170)/(75-87)   SpO2:  [94 %-96 %]     Physical Exam   Constitutional: He is oriented to person, place, and time. He appears well-nourished.   Eyes: EOM are normal. Pupils are equal, round, and reactive to light.   Neck: Normal range of motion. Neck supple.   Cardiovascular: Normal rate.    Pulmonary/Chest: Effort normal.   Abdominal: Soft.   Musculoskeletal:   BUEs AROM WNL  BLEs AROM diminished at the knees and with HF   Neurological: He is oriented to person, place, and time.   BUEs 5/5  BLEs 4/5 HF, 4+/5 HE, 4/5 KF/KE, 5/5 DF/PF   Skin: No rash noted.   Psychiatric: He has a normal mood and affect.       Data Review:    CBC:   Lab Results   Component Value Date    WBC 8.50 12/20/2016    RBC 5.06 12/20/2016    HGB 14.2 12/20/2016    HCT 43.3 12/20/2016     12/20/2016     BMP:   Lab Results   Component Value Date     (H)  01/04/2017     01/04/2017    K 5.1 01/04/2017     01/04/2017    CO2 21 (L) 01/04/2017    BUN 25 (H) 01/04/2017    CREATININE 1.6 (H) 01/04/2017    CALCIUM 9.0 01/04/2017     Coagulation:   Lab Results   Component Value Date    INR 0.9 12/20/2016        Assessment:    1.  S/P bilateral TKAs with residual impaired mobility and ability to perform ADLs.  I will order PT for the mobility impairments, OT for ADL deficits, RT and  for community reintegration and Rehab nursing for education.  Pain control is adequate according to the pt.   2.  HTN -- monitor  3.  CKD -- monitor  4.  Not sleeping well at night, but his wife says he is sleeping all day.  He is going to try to stay awake today

## 2017-01-07 NOTE — PROGRESS NOTES
Physical Therapy  Admit FIM Scores    Doug Garibay   MRN: [id number]        01/07/17 1200   Transfers   Bed/Chair/WC 1   Toilet 1   Shower 3   Locomotion   Distance Walked 2   Distance Wheelchair 3   Walk 2   Wheelchair 5   Mode B   Stairs 0   Barbara Loza DPT  1/7/2017

## 2017-01-07 NOTE — PROGRESS NOTES
"Physical Therapy  Weekend Evaluation    Doug Garibay   MRN: [id number]    01/07/17 0946   PT Time Calculation   PT Start Time 0946   PT Stop Time 1116   PT Total Time (min) 90 min   Treatment   Treatment Type Evaluation   General   PT Received On 01/07/17   Chart Reviewed Yes   Onset of Illness/Injury or Date of Surgery 01/04/17   Diagnosis B TKA   Additional Pertinent History See MD H&P   Date Referred to PT 01/07/17   Referring Physician Ileana   Missed Time Reason Not applicable   Family/Caregiver Present Yes   Patient Found (position) Seated in wheelchair   Precautions   General Precautions fall   Orthopedic Yes   RLE Weight Bearing WBAT   LLE Weight Bearing WBAT   Previous Level of Function   Ambulation Skills independent   Transfer Skills independent   ADL Skills independent   Work/Leisure Activity independent   Living Environment   Lives With spouse   Living Arrangements house   Home Accessibility stairs to enter home   Home Layout Able to live on 1st floor   Number of Stairs to Enter Home 1   Number of Stairs Within Home 20   Stair Railings at Home inside, present on left side   Transportation Available family or friend will provide   Living Environment Comment Pt lives with his wife in a 2 SH with 1 DEMOND and 20 steps inside with L HR. Pt is planning to retire from teaching. Pt's wife is a  and will only be able to assist him in the evening, but he has friends that can come assist him. 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 Pt has a walk in shower with small threshold step to enter.   Equipment Currently Used at Home cane, straight;shower chair   Subjective   Patient states "This right leg is the one that gave me the most problems before."   Patient stated goals "to be able to walk better."   Pain/Comfort   Pain Rating 5/10   Location - Side Bilateral   Location knee   Pain Addressed Pre-medicate for activity;Reposition;Distraction   Cognitive Status   Level of Consciousness (AVPU) alert "   Arousal Level opens eyes spontaneously   Orientation oriented x 4   Able to Follow Commands (Communication) WNL   Speech clear/fluent;follows commands   Mood/Behavior cooperative;calm   Sensory Examination   Additional Documentation yes   Light Touch   RLE w/i normal limits   LLE w/i normal limits       ROM: Left Knee   (Degrees) Extension 0   (Degrees) Flexion 95  (114 PROM)       ROM: Right Knee   (Degrees) Extension 4   (Degrees) Flexion 90  (100 PROM)   Manual Muscle Testing (MMT)   Manual Muscle Testing Results other (see comments)  (NT due to increased pain)   Tone   Right LE normal   Left LE normal   Observation   Appearance tall overweight male seated in wheelchair   Posture Normal for age;Rounded shoulders   Skin intact  (island dressing to B knees)   Bed Mobility   Bed Mobility yes   Rolling/Turning to Left Stand by assistance   Rolling/Turning Right Stand by assistance   Supine to Sit Stand by Assistance   Supine to Sit Comments x 1 on mat with increased time to bring BLEs onto mat   Sit to Supine Stand by Assistance   Transfers   Transfer yes   Sit to Stand   Sit <> Stand Assistance Total Assistance;Moderate Assistance   Sit <> Stand Assistive Device Rolling Walker;No Assistive Device   Trials/Comments Pt performed transfer without device and Total A x 2 helpers; x 2 trials with RW and Mod A for hip elevation   Stand to Sit   Assistance Total Assistance;Minimum Assistance   Assistive Device No Assistive Device;Rolling Walker   Trials/Comments x 1 without device and Total A for control of descent, x 2 with RW and Min A for control of descent   Chair to Mat   Chair<> Mat Technique Stand Pivot   Chair<>Mat Assistance Total Assistance;Moderate Assistance   Chair <> Mat Assistive Device Rolling Walker;No Assistive Device   Trials/Comments Pt was unable to complete transfer without use of RW as patient was unable to take a step without RW and would have required Total A x multiple helpers to complete  transfer; as part of treatment, PT educated patient on use of RW in order to perform transfer and required Mod A for hip elevation and Min A for control of descent   Mat to Chair   Technique Stand Pivot   Assistance Total Assistance;Moderate Assistance   Assistive Device Rolling Walker;No Assistive Device   Trials/Comments Pt was unable to complete transfer without use of RW as patient was unable to take a step without RW and would have required Total A x multiple helpers to complete transfer; as part of treatment, PT educated patient on use of RW in order to perform transfer and required Mod A for hip elevation and Min A for control of descent   Shower Transfer   Technique Stand Pivot   Assistance Moderate Assistance;Total Assistance   Assistive Device No Assistive Device;Rolling Walker   Trials/Comments Since patient has a walk in shower at home, pt attempted to step over simulated threshold into his bathroom shower.  Pt was unable to complete transfer without use of RW as patient was unable to take a step without RW and would have required Total A x multiple helpers to complete transfer. As part of treatment, PT educated patient on use of RW in order to perform transfer and required Mod A for hip elevation out of wheelchair and Min A for balance when stepping over threshold   Toilet Transfer   Toilet Transfer Technique Stand Pivot   Toilet Transfer Assistance Total Assistance;Moderate Assistance   Toilet Transfer Assistive Device Rolling Walker;No Assistive Device   Trials/Comments Pt was unable to perform transfer with HHA, as patient is unable to take steps without RW for support. Pt would require Total A to perform transfer with HHA. As part of treatment, pt utilized RW to perform transfer and required Mod A for hip elevation and Min A for control of descent   Wheelchair Activities   Propulsion Yes   Propulsion Type 1 Manual   Level 1 Level tile   Method 1 Left upper extremity;Right upper extremity   Level of  Assistance 1 Stand by assistsance   Description/ Details 1 x200ft   Gait   Gait Yes   Weight Bearing Status FWB   Gait 1   Surface 1 Level tile;Carpet   Gait Assistive Device Rolling walker;Hand held assist   Assistance 1 Total assistance;Moderate assistance   Gait Distance Pt was unable to take steps with Total A x 2 helpers; as part of treatment, pt utilized RW to perform gait trials. Pt walked 24 ft over level tile and Mod A for stability, and 91 ft over carpet with Mod A   Gait Pattern 3-point gait   Gait Deviation(s) decreased nicko;increased time in double stance;decreased velocity of limb motion;decreased step length;decreased stride length;excessive knee flexion   Impairments Contributing to Gait Deviations impaired balance;decreased strength;impaired postural control;pain;decreased ROM;decreased flexibility   Stairs   Stairs No  (Pt unsafe to attempt stair navigation at this time due to BLE weakness and decreased ROM)   Endurance Deficit   Endurance Deficit Yes   Endurance Deficit Description fatigue with mobility   Balance   Balance Yes   Static Sitting Balance   Static Sitting-Balance Support Feet supported;No upper extremity supported   Static Sitting-Level of Assistance Stand by Assistance   Static Sitting-Comment/# of Minutes x 1 on mat with no LOB   Static Standing Balance   Static Standing-Balance Support Left upper extremity supported;Right upper extremity supported   Static Standing-Level of Assistance Contact guard   Static Standing-Comment/# of Minutes x 1 minute in preparation for gait with no LOB   Activity Tolerance   Activity Tolerance Patient tolerated treatment well   After Treatment   Patient Position After Treatment Seated in wheelchair   Patient after treatment left call button in reach  (with polar ice)   Treatment   Treatment supine HS, seated knee flexion, QS for 3 x 10 reps   Assessment   Prognosis Good   Problem List Decreased strength;Decreased endurance;Impaired  balance;Decreased mobility;Decreased range of motion;Pain  (BLE edema)   Session Assessment other (see comments)  (Evaluation completed)   Assessment Pt presents to IPR s/p B TKA 1/4/17. Pt reports not using any equipment PTA. Pt would have required Total A for all transfers without a RW, as patient was unable to take steps without RW. As part of treatment PT educated patient on use of RW and was able to perform transfers with Mod A for hip elevation and Min A for control of descent. Pt is SBA for bed mobility. Pt presents with decreased B knee ROM, pain with movement, decreased endurance, and impaired mobility. Pt will benefit from skilled PT intervention in order to improve mobility and strength impairments.    Level of Motivation/Participation good   Barriers to Discharge Decreased caregiver support;Inaccessible home environment   Barriers to Discharge Comments 1 DEMOND and steps within, limited assistance at home   Short Term Goals   Additional Documentation yes   Time For Goal Achievement 7 days   Pt Will Perform Supine To Sit New goal;Supervision   Pt Will Perform Sit to Supine New goal;Supervision   Pt Will Logroll New goal;Supervision   Pt Will Transfer Sit to Stand New goal;With RW;With minimal assist   Pt Will Transfer Bed/Chair New goal;Stand Pivot;With RW;With minimal assist   Pt Will Ambulate New goal; feet;With RW;With minimal assist;With contact guard assistance   Pt Will Tolerate Exercise New goal;11-15 reps;With active assist   Other Goal Pt will transfer wheelchair <> shower chair with STPT, RW, and  Min/CGA   Other Goal 2 Pt will transfer wheelchair <> BSC with STPT, RW, and Min/CAG   Long Term Goals   Additional Documentation yes   Time For Goal Achievement (10-12 days)   Pt Will Perform Supine To Sit New goal;Modified Independently   Pt Will Perform Sit to Supine New goal;Modified Independently   Pt Will Logroll New goal;Modified Indepent   Pt Will Transfer Sit to Stand New goal;With RW;With  "stand by assist;Supervision   Pt Will Transfer Bed/Chair New goal;Stand Pivot;With RW;With stand by assist;Supervision   Pt Will Ambulate New goal;151-200 feet;> 200 feet;With RW;With stand by assist;Supervision   Pt Will Go Up / Down Stairs New goal;1 flight;With minimal assist;With contact guard assist   Pt Will Go Up / Down Curb Step New goal;4" curb step;6" curb step;With RW;With contact guard assist;With minimal assist   Pt Will Tolerate Exercise New goal;11-15 reps;With stand by assist   Pt Will Propel Wheelchair New goal;> 150 feet;Modfied Independently;With (B) UE   Other Goal Pt will transfer wheelchair <> shower chair with STPT, RW, and SBA/SPV   Other Goal 2 Pt will transfer wheelchair <> BSC wtih STPT, RW and SBA/SPV   Discharge Recommendations   Equipment Needed After Discharge bedside commode;wheelchair;walker, rolling   Discharge Facility/Level Of Care Needs outpatient PT   Plan   Planned Therapy Intervention Plan of care initiated;Bed mobility training;Transfer training;Gait training;Balance Training;ROM;Strengthening;Wheelchair Management/Propulsion   Therapy Frequency 2 times/day;Monday-Friday;Saturday or Sunday   Plan of Care Expires on 02/06/17   Physical Therapy Follow-up   PT Follow-up? Yes   PT - Next Visit Date 01/08/17   Treatment/Billable Minutes   Evaluation 60   Therapeutic Activity 15   Therapeutic Exercise 15   Total Time 90     Addendum to above:  Pt unsafe to attempt car transfer, stairs, curb step, and picking object up off of floor at this time.    Barbara Loza DPT  1/7/2017            "

## 2017-01-07 NOTE — PROGRESS NOTES
Admit note: arrived from main campus ([facility]); up to Rehab unit room 255A per wheelchair accompanied by an escort and 3 family members. Assisted to bed (2 people assist) and oriented to room controls.

## 2017-01-08 PROCEDURE — 12800000 HC REHAB SEMI-PRIVATE ROOM

## 2017-01-08 PROCEDURE — 99232 SBSQ HOSP IP/OBS MODERATE 35: CPT | Mod: ,,, | Performed by: PHYSICAL MEDICINE & REHABILITATION

## 2017-01-08 PROCEDURE — 25000003 PHARM REV CODE 250: Performed by: STUDENT IN AN ORGANIZED HEALTH CARE EDUCATION/TRAINING PROGRAM

## 2017-01-08 RX ADMIN — STANDARDIZED SENNA CONCENTRATE AND DOCUSATE SODIUM 1 TABLET: 8.6; 5 TABLET, FILM COATED ORAL at 07:01

## 2017-01-08 RX ADMIN — NIFEDIPINE 60 MG: 30 TABLET, FILM COATED, EXTENDED RELEASE ORAL at 07:01

## 2017-01-08 RX ADMIN — RAMELTEON 8 MG: 8 TABLET, FILM COATED ORAL at 12:01

## 2017-01-08 RX ADMIN — OXYCODONE HYDROCHLORIDE 15 MG: 5 TABLET ORAL at 04:01

## 2017-01-08 RX ADMIN — LEVOTHYROXINE SODIUM 150 MCG: 75 TABLET ORAL at 06:01

## 2017-01-08 RX ADMIN — ASPIRIN 325 MG: 325 TABLET, DELAYED RELEASE ORAL at 07:01

## 2017-01-08 RX ADMIN — HYDRALAZINE HYDROCHLORIDE 75 MG: 25 TABLET ORAL at 05:01

## 2017-01-08 RX ADMIN — FAMOTIDINE 20 MG: 20 TABLET, FILM COATED ORAL at 07:01

## 2017-01-08 RX ADMIN — OXYCODONE HYDROCHLORIDE 10 MG: 5 TABLET ORAL at 05:01

## 2017-01-08 RX ADMIN — HYDRALAZINE HYDROCHLORIDE 75 MG: 25 TABLET ORAL at 09:01

## 2017-01-08 RX ADMIN — PREGABALIN 75 MG: 75 CAPSULE ORAL at 09:01

## 2017-01-08 RX ADMIN — SIMVASTATIN 20 MG: 20 TABLET, FILM COATED ORAL at 09:01

## 2017-01-08 RX ADMIN — HYDRALAZINE HYDROCHLORIDE 75 MG: 25 TABLET ORAL at 01:01

## 2017-01-08 RX ADMIN — POLYETHYLENE GLYCOL 3350 17 G: 17 POWDER, FOR SOLUTION ORAL at 08:01

## 2017-01-08 RX ADMIN — OXYCODONE HYDROCHLORIDE 15 MG: 5 TABLET ORAL at 11:01

## 2017-01-08 RX ADMIN — OXYCODONE HYDROCHLORIDE 15 MG: 5 TABLET ORAL at 09:01

## 2017-01-08 NOTE — PLAN OF CARE
Problem: Patient Care Overview  Goal: Plan of Care Review  Outcome: Ongoing (interventions implemented as appropriate)  Pt care plan updated and individualized as needed and progress continues.  See associated flowsheets for relevant documentation. Frequent rounds made per protocol to maintain pt safety and meet pt needs.  Continuing to monitor and follow up as needed.  Pt resting this shift in no apparent distress, though noted to be having trouble sleeping despite sleeping pill being given as ordered and care being clustered to avoid disturbance.  Pt denies discomfort or other reason for sleeplessness.     Problem: Fall Risk (Adult)  Goal: Identify Related Risk Factors and Signs and Symptoms  Related risk factors and signs and symptoms are identified upon initiation of Human Response Clinical Practice Guideline (CPG)   Outcome: Ongoing (interventions implemented as appropriate)  Pt remains free from falls or trauma thus far this shift.        Problem: Pain, Acute (Adult)  Goal: Acceptable Pain Control/Comfort Level  Patient will demonstrate the desired outcomes by discharge/transition of care.   Outcome: Ongoing (interventions implemented as appropriate)  Pt reports pain well controlled this shift.  Polar Ice in use to BLE.

## 2017-01-08 NOTE — PROGRESS NOTES
Doug Garibay is a 56-year-old male with PMHx of HTN, HLD, JUAN RAMON, arthritis, gout, and hypothyroidism. No issues overnight. He complains of lack of sleep, took sleeping med at 3 am and it helped last night.  i encourage him to take it earlier.  He has not had BM since wednesday    Past Medical History   Diagnosis Date    Arthritis     Gout     High cholesterol     Hypertension     Hypothyroidism     Thyroid disease        Past Surgical History   Procedure Laterality Date    Thyroidectomy       1990's    Parathyroidectomy       not sure of date, was done here    Tonsillectomy         Family History   Problem Relation Age of Onset    Stroke Mother     Hyperlipidemia Mother     Hypertension Mother     Stroke Father     Hyperlipidemia Father     Hypertension Father        Social History     Social History    Marital status:      Spouse name: N/A    Number of children: N/A    Years of education: N/A     Social History Main Topics    Smoking status: Never Smoker    Smokeless tobacco: Never Used    Alcohol use No    Drug use: No    Sexual activity: Not Asked     Other Topics Concern    None     Social History Narrative       Current Facility-Administered Medications   Medication Dose Route Frequency Provider Last Rate Last Dose    acetaminophen tablet 650 mg  650 mg Oral Q6H PRN Paras De La Fuente MD        aluminum-magnesium hydroxide-simethicone 200-200-20 mg/5 mL suspension 15 mL  15 mL Oral Q6H PRN Paras De La Fuente MD        aspirin EC tablet 325 mg  325 mg Oral BID Paras De La Fuente MD   325 mg at 01/08/17 0757    bisacodyl suppository 10 mg  10 mg Rectal Daily PRN Arnold Tejeda MD        docusate sodium enema 1 enema  1 enema Rectal Daily PRN Arnold Tejeda MD        famotidine tablet 20 mg  20 mg Oral BID Paras De La Fuente MD   20 mg at 01/08/17 0757    hydrALAZINE tablet 75 mg  75 mg Oral TID Paras De La Fuente MD   75 mg at 01/08/17 0531    levothyroxine tablet 150 mcg   150 mcg Oral Before breakfast Paras De La Fuente MD   150 mcg at 01/08/17 0631    magnesium hydroxide 400 mg/5 ml suspension 2,400 mg  30 mL Oral Daily PRN Arnold Tejeda MD        naloxone 0.4 mg/mL injection 0.02 mg  0.02 mg Intravenous PRN Paras De La Fuente MD        nifedipine 30 MG ORAL TR24 24 hr tablet 60 mg  60 mg Oral Daily Paras De La Fuente MD   60 mg at 01/08/17 0757    ondansetron disintegrating tablet 8 mg  8 mg Oral Q6H PRN Arnold Tejeda MD        oxycodone immediate release tablet 10 mg  10 mg Oral Q3H PRN Paras De La Fuente MD   10 mg at 01/08/17 0534    oxycodone immediate release tablet 15 mg  15 mg Oral Q3H PRN Paras De La Fuente MD   15 mg at 01/07/17 1723    oxycodone immediate release tablet 5 mg  5 mg Oral Q3H PRN Paras De La Fuente MD        polyethylene glycol packet 17 g  17 g Oral Q12H PRN Arnold Tejeda MD        polyethylene glycol packet 17 g  17 g Oral Daily Paras De La Fuente MD   17 g at 01/08/17 0800    pregabalin capsule 75 mg  75 mg Oral QHS Paras De La Fuente MD   75 mg at 01/07/17 2039    ramelteon tablet 8 mg  8 mg Oral Nightly PRN Paras De La Fuente MD   8 mg at 01/08/17 0011    senna-docusate 8.6-50 mg per tablet 1 tablet  1 tablet Oral BID Paras De La Fuente MD   1 tablet at 01/08/17 0757    simvastatin tablet 20 mg  20 mg Oral QHS Paras De La Fuente MD   20 mg at 01/07/17 2039    sodium phosphates 19-7 gram/118 mL enema 1 enema  1 enema Rectal Daily PRN Arnold Tejeda MD           Review of patient's allergies indicates:  No Known Allergies    Review of Systems:  Constitutional: no fever or chills  Eyes: no visual changes  Ears, nose, mouth, throat, and face: no nasal congestion or sore throat  Respiratory: no cough or shorness of breath  Cardiovascular: no chest pain or palpitations  Gastrointestinal: no nausea or vomiting, no abdominal pain or change in bowel habits  Genitourinary: no hematuria or dysuria  Integument/breast: no rash or  pruritis  Hematologic/lymphatic: no easy bruising or lymphadenopathy  Musculoskeletal: pos knee pain  Neurological: no seizures or tremors  Behavioral/Psych: no auditory or visual hallucinations  Endocrine: no heat or cold intolerance    Vitals:    01/07/17 0800 01/07/17 1339 01/07/17 1900 01/08/17 0625   BP: (!) 149/81 (!) 141/82 139/79 135/70   BP Location:   Left arm Left arm   Patient Position:   Lying Lying   BP Method:   Automatic Automatic   Pulse:   102 95   Resp:   20 18   Temp:   100.3 °F (37.9 °C) 100 °F (37.8 °C)   TempSrc:   Oral Oral   SpO2:   (!) 93% 95%   Weight:       Height:         Physical Exam:  General appearance: well developed, well nourished  Head: normocephalic, atraumatic  Lungs:  normal respiratory effort  Chest wall: no tenderness  Abdomen: soft, non-tender non-distented;   Extremities: no calf tenderness  Neurologic: Normal strength and tone. No focal numbness or weakness    A/P  1. S/P bilateral TKAs with residual impaired mobility and ability to perform ADLs. I will order PT for the mobility impairments, OT for ADL deficits, RT and  for community reintegration and Rehab nursing for education. Pain control is adequate according to the pt.   2. HTN -- monitor  3. CKD -- monitor  4. Not sleeping well at night, encourage him to take sleeping pill earlier  5.  Constipation, he has meds ordered.  He is going to ask for meds today

## 2017-01-09 PROCEDURE — 25000003 PHARM REV CODE 250: Performed by: STUDENT IN AN ORGANIZED HEALTH CARE EDUCATION/TRAINING PROGRAM

## 2017-01-09 PROCEDURE — 97530 THERAPEUTIC ACTIVITIES: CPT

## 2017-01-09 PROCEDURE — 97116 GAIT TRAINING THERAPY: CPT

## 2017-01-09 PROCEDURE — 25000003 PHARM REV CODE 250: Performed by: PHYSICAL MEDICINE & REHABILITATION

## 2017-01-09 PROCEDURE — 12800000 HC REHAB SEMI-PRIVATE ROOM

## 2017-01-09 PROCEDURE — 97110 THERAPEUTIC EXERCISES: CPT

## 2017-01-09 PROCEDURE — 97535 SELF CARE MNGMENT TRAINING: CPT

## 2017-01-09 PROCEDURE — 99233 SBSQ HOSP IP/OBS HIGH 50: CPT | Mod: ,,, | Performed by: PHYSICAL MEDICINE & REHABILITATION

## 2017-01-09 PROCEDURE — 97150 GROUP THERAPEUTIC PROCEDURES: CPT

## 2017-01-09 RX ORDER — OXYCODONE HCL 10 MG/1
10 TABLET, FILM COATED, EXTENDED RELEASE ORAL DAILY
Status: DISCONTINUED | OUTPATIENT
Start: 2017-01-09 | End: 2017-01-13

## 2017-01-09 RX ORDER — CHOLECALCIFEROL (VITAMIN D3) 25 MCG
1000 TABLET ORAL DAILY
Status: DISCONTINUED | OUTPATIENT
Start: 2017-01-09 | End: 2017-01-17 | Stop reason: HOSPADM

## 2017-01-09 RX ORDER — FERROUS SULFATE 325(65) MG
325 TABLET, DELAYED RELEASE (ENTERIC COATED) ORAL DAILY
Status: DISCONTINUED | OUTPATIENT
Start: 2017-01-09 | End: 2017-01-17 | Stop reason: HOSPADM

## 2017-01-09 RX ADMIN — POLYETHYLENE GLYCOL 3350 17 G: 17 POWDER, FOR SOLUTION ORAL at 09:01

## 2017-01-09 RX ADMIN — NIFEDIPINE 60 MG: 30 TABLET, FILM COATED, EXTENDED RELEASE ORAL at 09:01

## 2017-01-09 RX ADMIN — HYDRALAZINE HYDROCHLORIDE 75 MG: 25 TABLET ORAL at 05:01

## 2017-01-09 RX ADMIN — OXYCODONE HYDROCHLORIDE 10 MG: 10 TABLET, FILM COATED, EXTENDED RELEASE ORAL at 09:01

## 2017-01-09 RX ADMIN — OXYCODONE HYDROCHLORIDE 10 MG: 5 TABLET ORAL at 02:01

## 2017-01-09 RX ADMIN — STANDARDIZED SENNA CONCENTRATE AND DOCUSATE SODIUM 1 TABLET: 8.6; 5 TABLET, FILM COATED ORAL at 09:01

## 2017-01-09 RX ADMIN — FAMOTIDINE 20 MG: 20 TABLET, FILM COATED ORAL at 09:01

## 2017-01-09 RX ADMIN — HYDRALAZINE HYDROCHLORIDE 75 MG: 25 TABLET ORAL at 09:01

## 2017-01-09 RX ADMIN — HYDRALAZINE HYDROCHLORIDE 75 MG: 25 TABLET ORAL at 02:01

## 2017-01-09 RX ADMIN — FERROUS SULFATE TAB EC 325 MG (65 MG FE EQUIVALENT) 325 MG: 325 (65 FE) TABLET DELAYED RESPONSE at 09:01

## 2017-01-09 RX ADMIN — VITAMIN D, TAB 1000IU (100/BT) 1000 UNITS: 25 TAB at 09:01

## 2017-01-09 RX ADMIN — LEVOTHYROXINE SODIUM 150 MCG: 75 TABLET ORAL at 09:01

## 2017-01-09 RX ADMIN — ASPIRIN 325 MG: 325 TABLET, DELAYED RELEASE ORAL at 09:01

## 2017-01-09 RX ADMIN — PREGABALIN 75 MG: 75 CAPSULE ORAL at 09:01

## 2017-01-09 RX ADMIN — OXYCODONE HYDROCHLORIDE 15 MG: 5 TABLET ORAL at 10:01

## 2017-01-09 RX ADMIN — ASPIRIN 325 MG: 325 TABLET, DELAYED RELEASE ORAL at 07:01

## 2017-01-09 RX ADMIN — STANDARDIZED SENNA CONCENTRATE AND DOCUSATE SODIUM 1 TABLET: 8.6; 5 TABLET, FILM COATED ORAL at 07:01

## 2017-01-09 RX ADMIN — OXYCODONE HYDROCHLORIDE 15 MG: 5 TABLET ORAL at 04:01

## 2017-01-09 RX ADMIN — FAMOTIDINE 20 MG: 20 TABLET, FILM COATED ORAL at 07:01

## 2017-01-09 NOTE — PROGRESS NOTES
Occupational Therapy  [facility]    Doug Garibay  MRN: [id number]  Room/Bed: E255/E255 B       01/09/17 1200   Transfers   Bed/Chair/WC 3   Toilet 3   Shower 3   Self Care   Eating 7   Grooming 6   Bathing 4   Dressing-Upper 7   Dressing-Lower 3   Toileting 4   Communication   Comprehension 7   Mode B   Expression 7   Mode B   Social Cognition   Social Interaction 7   Problem Solving 7   Memory 6       MONA Meehan  1/9/2017

## 2017-01-09 NOTE — PROGRESS NOTES
"Occupational Therapy  AM Treatment  Doug Garibay   MRN: [id number]   Room/Bed: E255/E255 B     01/09/17 0915   OT Time Calculation   OT Start Time 0915   OT Stop Time 1046   OT Total Time (min) 91 min   General   OT Date of Treatment 01/09/17   Family/Caregiver Present No   Patient Found (position) Supine in bed   Precautions   General Precautions fall   RLE Weight Bearing WBAT   LLE Weight Bearing WBAT   Subjective   Patient states "I have a high pain tolerance."   Pain/Comfort   Pain Rating 5/10   Location - Side Bilateral   Location knee   Pain Addressed Pre-medicate for activity   Pain Comment with movement and standing   Sit to Stand   Sit <> Stand Assistance Moderate Assistance   Sit <> Stand Assistive Device Rolling Walker   Trials/Comments from EOB and wc   Stand to Sit   Assistance Moderate Assistance   Assistive Device Rolling Walker   Trials/Comments to EOB and wc   Bed to Chair   Bed <> Chair Technique Stand Pivot   Bed <> Chair Transfer Assistance Moderate Assistance   Bed <> Chair Assistive Device Rolling Walker   Trials/Comments from EOB>wc   Feeding   Feeding Level of Assistance Independent   Feeding Where Assessed Wheelchair   Grooming   Grooming Level of Assistance Modified independent   Grooming Where Assessed Wheelchair   Bathing   Bathing Level of Assistance Minimum assistance   Bathing Where Assessed Edge of bed   Bathing Comments steadying assist from stance and feet   UE Dressing   UE Dressing Level of Assistance Independent   UE Dressing Where Assessed Edge of bed   UE Dressing Comments pullover shirt   LE Dressing   LE Dressing  Level of Assistance Moderate assistance   LE Dressing Where Assessed Edge of bed   LE Dressing Comments steadying assist from stance and clothing maangement over hips and  socks   Exercise Tools   Exercise Tools Yes   UE Ergometer 16min with moderate resistance   Bilateral Marty 5# x 100 reps   Balance   Balance Yes   Dynamic Sitting Balance   Dynamic " Sitting-Balance Support No upper extremity supported   Dynamic Sitting-Balance Reaching for objects;Reaching across midline   Dynamic Sitting-Comments (S) to EOB   Dynamic Standing Balance   Dynamic Standing-Balance Support Right upper extremity supported;Left upper extremity supported   Dynamic Standing-Balance Reaching for objects;Reaching across midline   Dynamic Standing-Comments Min (A) to complete spatial relations board. Pt required 1 seated rest break to complete.   Activity Tolerance   Activity Tolerance Patient tolerated treatment well   After Treatment   Patient Position After Treatment Seated in wheelchair   Patient after treatment left call button in reach;nursing notified   Assessment   Prognosis Good   Problem List Decreased Self Care skills;Decreased upper extremity strength;Decreased endurance;Decreased functional mobility;Decreased IADLs   Assessment Pt participated well in tx session but remains with decreased (I) with ADLs and functional mobility. Pt would continue to benefit from skilled OT services.   Level of Motivation/Participation Good   Discharge Recommendations   Equipment Needed After Discharge bedside commode;wheelchair;walker, rolling   Discharge Facility/Level Of Care Needs outpatient PT   Plan   Plan Self care retraining;Functional transfer training;UE thereex;Equipment Training;Safety training;Functional endurance training;Patient education;Postural control;AROM;Functional Standing Activities   Therapy Frequency 2 times/day   Occupational Therapy Follow-up   OT Follow-up? Yes   Treatment/Billable Minutes   Self Care/Home Management 45   Therapeutic Activity 31   Therapeutic Exercise 15   Total Time 91       MONA Meehan  1/9/2017    LEGEND:   CGA: Contact Guard Assist   EOB: Edgeof Bed   HHA: Hand Held Assist   HOB: Head of Bed   (I): Independent-patient performs task in a timely manner   Max (A): Maximal Assist-patient performs 25-49% of task   Min (A): Minimal Assist-  patient performs 75% or more of task   Mod (A): Moderate Assist- patient performs 50-74% of task   NA: Not applicable   NT: Not tested   OOB: Out of Bed   PTA: Prior to admit   QC: Quad Cane   RW: Rolling Walker   (S): Supervision- patient requires cues, coaxing, prompting   SBA: Stand By Assist   SC: Straight Cane   SW: Standard Walker   TBA: To be assessed   Total (A): Total Assist- patient performs less than 25% of task   WC: Wheelchair   WFL: Within Functional Limits   WNL: Within Normal Limits

## 2017-01-09 NOTE — PROGRESS NOTES
Physical Therapy  FIM scores    Doug Garibay   MRN: [id number]                  01/09/17 1352   Transfers   Bed/Chair/WC 4   Locomotion   Distance Walked 2   Distance Wheelchair 0   Walk 2   Wheelchair 0   Mode C   Stairs 0           Grant Romano, PT 1/9/2017

## 2017-01-09 NOTE — PLAN OF CARE
Problem: Patient Care Overview  Goal: Plan of Care Review  Outcome: Ongoing (interventions implemented as appropriate)  POC reviewed with pt who verbalized understanding. Pt AAOX4.  Remains free of falls and injury. VSS and afebrile. Pt on regular diet tolerating well, denies nausea. C/o slight pain controlled with PRN medication.  Encourage pt repositioned to help decrease pressure. Bilateral knees dressing, CDI.  Pt denies numbness or tingling at this time.  No acute events. No distress noted. WCTM.     Problem: Fall Risk (Adult)  Goal: Identify Related Risk Factors and Signs and Symptoms  Related risk factors and signs and symptoms are identified upon initiation of Human Response Clinical Practice Guideline (CPG)   Outcome: Ongoing (interventions implemented as appropriate)  Bed locked and low with call light within reach.  Will continue to monitor    Problem: Pain, Acute (Adult)  Goal: Identify Related Risk Factors and Signs and Symptoms  Related risk factors and signs and symptoms are identified upon initiation of Human Response Clinical Practice Guideline (CPG)   Outcome: Ongoing (interventions implemented as appropriate)  Pt is receiving adequate pain relief from ordered analgesics.

## 2017-01-09 NOTE — PROGRESS NOTES
Physical Therapy   Treatment    Doug Garibay   MRN: [id number]                01/09/17 1300   PT Time Calculation   PT Start Time 1300   PT Stop Time 1345   PT Total Time (min) 45 min   Treatment   Treatment Type Treatment   PT/PTA PT   General   PT Received On 01/09/17   Family/Caregiver Present Yes   Patient Found (position) Supine in bed   Precautions   General Precautions fall   RLE Weight Bearing WBAT   LLE Weight Bearing WBAT   Subjective   Patient states he has a high tolerance for pain.   Pain/Comfort   Pain Rating 4/10   Location - Side Left   Location foot   Pain Addressed Pre-medicate for activity;Reposition;Distraction   Pain Rating Post-Intervention other (see comments)  (pt does not c/o pain at end of session)   Bed Mobility   Bed Mobility yes   Supine to Sit Modified Independent   Supine to Sit Comments x 1 on mat and 1 from bed   Sit to Supine Modified Independent;Other (see comments)  (x 1 on mat)   Transfers   Transfer yes   Sit to Stand   Sit <> Stand Assistance Minimum Assistance   Sit <> Stand Assistive Device Rolling Walker   Trials/Comments 3 trials~ 1 from elevated bed, 1 from w/c and 1 from mat~ cues for proper hand placement   Stand to Sit   Assistance Minimum Assistance   Assistive Device Rolling Walker   Trials/Comments 3 trials~ cues to refrain from placing feet forward during transition   Mat to Chair   Technique Stand Pivot   Assistance Minimum Assistance   Assistive Device Rolling Walker   Trials/Comments 1 trial~ pt performs from elevated mat   Wheelchair Activities   Propulsion No   Gait   Gait Yes   Weight Bearing Status full   Gait 1   Surface 1 Level tile   Gait Assistive Device Rolling walker   Assistance 1 Contact Guard Assistance   Gait Distance 77 feet   Gait Pattern 3-point gait   Gait Deviation(s) decreased nicko;increased time in double stance;decreased velocity of limb motion;decreased step length;decreased stride length;decreased swing-to-stance ratio;decreased  weight-shifting ability;forward lean;other (see comments)  (decreased knee flex during swing phase)   Impairments Contributing to Gait Deviations impaired balance;decreased flexibility;decreased ROM;impaired postural control;decreased strength;pain   Stairs   Stairs No   Balance   Balance No   Supine   Supine-Exercises Lower extremity   Supine-Exercise Type Quad sets;Other (Comment)  (knee ext stretches on bolster; hip and knee flex/ext)   Supine-Exercise Comments pt performs above to B LE, A/AAROM x 10 reps each  (knee ext stretches on small bolster x 1-2 min. each)   Seated   Seated-Exercises Lower extremity   Seated-Exercise Type Long arc quads   Seated-Exercise Comments A/AAROM x 10 reps       ROM: Left Knee   (Degrees) Extension -2   (Degrees) Flexion 100  (AAROM; overpressure by PT)       ROM: Right Knee   (Degrees) Extension -7   (Degrees) Flexion 100  (AAROM; overpressure by PT)   Activity Tolerance   Activity Tolerance Patient tolerated treatment well   After Treatment   Patient Position After Treatment Seated in wheelchair   Patient after treatment left (pt remains in gym for seated group)   Assessment   Prognosis Good   Problem List Decreased strength;Decreased range of motion;Decreased endurance;Impaired balance;Decreased mobility;Pain   Session Assessment progressing toward goals   Assessment Pt is progressing in PT but remains limited by decreased knee ROM, pain, decreased strength and impaired postural control.  Pt is motivated though, and should continue to make functional/ROM gains.   Level of Motivation/Participation good   Barriers to Discharge Inaccessible home environment;Decreased caregiver support   Barriers to Discharge Comments 1 DEMOND and steps within; limited assistance at home   Discharge Recommendations   Equipment Needed After Discharge 3-in-1 commode;wheelchair;walker, rolling;shower chair   Discharge Facility/Level Of Care Needs outpatient PT   Plan   Planned Therapy Intervention  Continue with current plan;Transfer training;Gait training;Balance Training;ROM;Stretching;Strengthening;Postural Re-education;Wheelchair Management/Propulsion   Therapy Frequency 2 times/day;daily;Monday-Friday;Saturday or Sunday   Physical Therapy Follow-up   PT Follow-up? Yes   PT - Next Visit Date 01/10/17   Treatment/Billable Minutes   Gait training 15   Therapeutic Activity 10   Therapeutic Exercise 20   Total Time 45           Grant Romano, PT 1/9/2017

## 2017-01-09 NOTE — PROGRESS NOTES
"Physical Therapy   Seated Endurance Group    Doug Garibay   MRN: [id number]        01/09/17 1405   PT Time Calculation   PT Start Time 1405   PT Stop Time 1450   PT Total Time (min) 45 min   Treatment   Treatment Type Treatment  (Seated Endurance Group Treatment Session)   PT/PTA PT   General   PT Received On 01/09/17   Family/Caregiver Present No   Patient Found (position) Seated in wheelchair   Precautions   General Precautions fall   Orthopedic Yes   RLE Weight Bearing WBAT   LLE Weight Bearing WBAT   Subjective   Patient states "I am feeling kind of tired from the medicine"   Other Comments   Comments Patient participated in 45 minute seated high endurance group. The group activity challenged bilateral upper extremity and lower extremity strengthening. In addition, cardiovascular and functional endurance were challenged during this group. Patient was educated on appropriate pressure relief techniques to perform in wheelchair including: wheelchair pushups and lateral weight shifts. Patient was educated to perform weight shift in wheelchair every 20 minutes.    Patient completed 20 reps x 5 sets bicep curls, side raises, shoulder flexion, shoulder extension (in UE circuit)    Patient completed 20 reps x 5 sets hip flexion, knee flexion, knee extension, toe and heel taps (in LE circuit)    - Alternating punches, side punches, diagonal reaches x 20 reps x 3 reps    Pt required two rest breaks during therapeutic exercises. These activities were performed in a group setting to encourage participation with peers and social interaction skills.      Treatment/Billable Minutes   Therapeutic Activity Group 45   Total Time 45       Linda West, PT  1/9/2017          "

## 2017-01-09 NOTE — PROGRESS NOTES
Admit Assessment    Patient Identification  Doug Garibay   :  1960  Admit Date:  2017  Attending Provider:  Arnold Tejeda MD                EXPECTED DATE OF DISCHARGE:  To be determined at first treatment team staffing.     Pt was admitted to inpatient rehab.   is involved.    NAME OF PERSON PROVIDING INFORMATION FOR ASSESSMENT:  Patient    EMERGENCY CONTACT:  Primary Contact:  Denisa Garibay                             Mobile Phone: [phone]                Relation: Spouse    LIVING ARRANGEMENTS: [address], [place] [address].  Pt lives with spouse in a two story home.      LEVEL OF INDEPENDENCE/ASSISTANCE REQUIRED PRIOR TO ADMISSION:  Independent and working      HOME HEALTH OR OUTPATIENT THERAPY USAGE PRIOR TO ADMISSION:  None      DURABLE MEDICAL EQUIPMENT:  Cane and shower chair    Patient Preference of agencies include:  [facility] affiliated  Patient/Caregiver informed of right to choose providers or agencies.  Patient provides permission to release any necessary information to [facility] and to Non-[facility] agencies as needed to facilitate patient care, treatment planning, and patient discharge planning.  Written and verbal resources provided.      Outpatient Pharmacy:     [facility] - [address] [place] [address].  [address] [place], [place] [address] [address]  Phone: [phone] Fax: [phone]      FAMILY/CAREGIVER SUPPORT:  Pt's spouse is primary caregiver, however he reported that he has friends available to assist while spouse is working.      LEVEL OF ORIENTATION:  AA&Ox4      ADJUSTMENT TO DIAGNOSIS AND TREATMENT:  Pt adjusting appropriately      EMOTIONAL/BEHAVIORAL STATUS/COGNITIVE ISSUES:  Pt presented with calm mood, good recall, and concentration.  Pt asked and answered questions appropriately.        History/Current Substance Use:   Social History     Social History Main Topics    Smoking status: Never Smoker    Smokeless  tobacco: Never Used    Alcohol use No    Drug use: No    Sexual activity: Not on file         Financial:  Payor/Plan Subscr  Sex Relation Sub. Ins. ID Effective Group Num   1. BLUE CROSS BL* BALNCA DAMON* 1960 Male  [id number]                                    [address] [id number]          DISCHARGE PLAN:  Home with spouse      PATIENT PREFERENCE OF AGENCIES: [facility] affiliated       PATIENT/CAREGIVER CONCERNS EXPRESSED DURING ASSESSMENT:  None      INTERVENTIONS/REFERRALS:  Pt caregiver engaged in treatment planning process. SW will continue to follow for all resources, education, discharge planning and psychosocial needs.  Patient/caregiver engaged in treatment planning process.  SW available as needed.

## 2017-01-09 NOTE — PROGRESS NOTES
Pt without new c/o.    Interval hx:  I have reviewed the pt's HPI and PFSH and it is unchanged from admission.     VSS - Temp:  [98.4 °F (36.9 °C)-98.9 °F (37.2 °C)] 98.4 °F (36.9 °C)  Pulse:  [88-90] 88  Resp:  [14-17] 14  BP: (131-145)/(65-81) 136/74    Review of Systems   Eyes: Negative for blurred vision.   Respiratory: Negative for cough.   Cardiovascular: Negative for chest pain.   Gastrointestinal: Negative for nausea and vomiting.   Genitourinary: Negative for dysuria.   Musculoskeletal: Positive for joint pain. Negative for myalgias.   Skin: Negative.   Neurological: Positive for weakness. Negative for dizziness, sensory change and headaches.   Psychiatric/Behavioral: Negative for depression. The patient has insomnia.      Physical Exam   Constitutional: He is oriented to person, place, and time. He appears well-nourished.   Eyes: EOM are normal. Pupils are equal, round, and reactive to light.   Neck: Normal range of motion. Neck supple.   Cardiovascular: Normal rate.   Pulmonary/Chest: Effort normal.   Abdominal: Soft.   Musculoskeletal:   BUEs AROM WNL  BLEs AROM diminished at the knees and with HF   Neurological: He is oriented to person, place, and time.   BUEs 5/5  BLEs 4/5 HF, 4+/5 HE, 4/5 KF/KE, 5/5 DF/PF   Skin: No rash noted.   Psychiatric: He has a normal mood and affect.     Assessment:    1. S/P bilateral TKAs -- no signs/infxn.  Sit-->stand MDA, T/Fs MDA, gait 91' MDA.   L KF 95 deg, ext 0 deg.  R KF 90 deg, ext -4 deg.  UBD SBA, LBD MDA.  Pain is bit limiting in therapy -- will add Oxycontin 10mg daily for a few days.  2. HTN -- controlled  3. CKD -- stable.  Check in AM.        Future Appointments  Date Time Provider Department Center   1/18/2017 1:30 PM Alice Tony NP [facility]

## 2017-01-10 LAB
ANION GAP SERPL CALC-SCNC: 7 MMOL/L
BUN SERPL-MCNC: 41 MG/DL
CALCIUM SERPL-MCNC: 10.3 MG/DL
CHLORIDE SERPL-SCNC: 97 MMOL/L
CO2 SERPL-SCNC: 24 MMOL/L
CREAT SERPL-MCNC: 1.9 MG/DL
EST. GFR  (AFRICAN AMERICAN): 43.9 ML/MIN/1.73 M^2
EST. GFR  (NON AFRICAN AMERICAN): 38 ML/MIN/1.73 M^2
GLUCOSE SERPL-MCNC: 106 MG/DL
POTASSIUM SERPL-SCNC: 4.8 MMOL/L
SODIUM SERPL-SCNC: 128 MMOL/L

## 2017-01-10 PROCEDURE — 97530 THERAPEUTIC ACTIVITIES: CPT

## 2017-01-10 PROCEDURE — 80048 BASIC METABOLIC PNL TOTAL CA: CPT

## 2017-01-10 PROCEDURE — 97110 THERAPEUTIC EXERCISES: CPT

## 2017-01-10 PROCEDURE — 25000003 PHARM REV CODE 250: Performed by: STUDENT IN AN ORGANIZED HEALTH CARE EDUCATION/TRAINING PROGRAM

## 2017-01-10 PROCEDURE — 97112 NEUROMUSCULAR REEDUCATION: CPT

## 2017-01-10 PROCEDURE — 97116 GAIT TRAINING THERAPY: CPT

## 2017-01-10 PROCEDURE — 97535 SELF CARE MNGMENT TRAINING: CPT

## 2017-01-10 PROCEDURE — 25000003 PHARM REV CODE 250: Performed by: PHYSICAL MEDICINE & REHABILITATION

## 2017-01-10 PROCEDURE — 36415 COLL VENOUS BLD VENIPUNCTURE: CPT

## 2017-01-10 PROCEDURE — 12800000 HC REHAB SEMI-PRIVATE ROOM

## 2017-01-10 PROCEDURE — 97150 GROUP THERAPEUTIC PROCEDURES: CPT

## 2017-01-10 PROCEDURE — 99233 SBSQ HOSP IP/OBS HIGH 50: CPT | Mod: ,,, | Performed by: PHYSICAL MEDICINE & REHABILITATION

## 2017-01-10 RX ORDER — SYRING-NEEDL,DISP,INSUL,0.3 ML 29 G X1/2"
296 SYRINGE, EMPTY DISPOSABLE MISCELLANEOUS ONCE
Status: COMPLETED | OUTPATIENT
Start: 2017-01-10 | End: 2017-01-10

## 2017-01-10 RX ORDER — METOPROLOL SUCCINATE 100 MG/1
100 TABLET, EXTENDED RELEASE ORAL NIGHTLY
Status: DISCONTINUED | OUTPATIENT
Start: 2017-01-10 | End: 2017-01-13

## 2017-01-10 RX ORDER — ZOLPIDEM TARTRATE 5 MG/1
5 TABLET ORAL NIGHTLY PRN
Status: DISCONTINUED | OUTPATIENT
Start: 2017-01-10 | End: 2017-01-17 | Stop reason: HOSPADM

## 2017-01-10 RX ORDER — LACTULOSE 10 G/15ML
20 SOLUTION ORAL EVERY 6 HOURS PRN
Status: DISCONTINUED | OUTPATIENT
Start: 2017-01-10 | End: 2017-01-17 | Stop reason: HOSPADM

## 2017-01-10 RX ADMIN — POLYETHYLENE GLYCOL 3350 17 G: 17 POWDER, FOR SOLUTION ORAL at 07:01

## 2017-01-10 RX ADMIN — NIFEDIPINE 60 MG: 30 TABLET, FILM COATED, EXTENDED RELEASE ORAL at 07:01

## 2017-01-10 RX ADMIN — ONDANSETRON 8 MG: 8 TABLET, ORALLY DISINTEGRATING ORAL at 11:01

## 2017-01-10 RX ADMIN — ASPIRIN 325 MG: 325 TABLET, DELAYED RELEASE ORAL at 08:01

## 2017-01-10 RX ADMIN — FAMOTIDINE 20 MG: 20 TABLET, FILM COATED ORAL at 07:01

## 2017-01-10 RX ADMIN — OXYCODONE HYDROCHLORIDE 15 MG: 5 TABLET ORAL at 04:01

## 2017-01-10 RX ADMIN — OXYCODONE HYDROCHLORIDE 10 MG: 10 TABLET, FILM COATED, EXTENDED RELEASE ORAL at 07:01

## 2017-01-10 RX ADMIN — FAMOTIDINE 20 MG: 20 TABLET, FILM COATED ORAL at 08:01

## 2017-01-10 RX ADMIN — FERROUS SULFATE TAB EC 325 MG (65 MG FE EQUIVALENT) 325 MG: 325 (65 FE) TABLET DELAYED RESPONSE at 07:01

## 2017-01-10 RX ADMIN — OXYCODONE HYDROCHLORIDE 15 MG: 5 TABLET ORAL at 07:01

## 2017-01-10 RX ADMIN — MAGESIUM CITRATE 296 ML: 1.75 LIQUID ORAL at 01:01

## 2017-01-10 RX ADMIN — LEVOTHYROXINE SODIUM 150 MCG: 75 TABLET ORAL at 07:01

## 2017-01-10 RX ADMIN — STANDARDIZED SENNA CONCENTRATE AND DOCUSATE SODIUM 1 TABLET: 8.6; 5 TABLET, FILM COATED ORAL at 08:01

## 2017-01-10 RX ADMIN — HYDRALAZINE HYDROCHLORIDE 75 MG: 25 TABLET ORAL at 05:01

## 2017-01-10 RX ADMIN — PREGABALIN 75 MG: 75 CAPSULE ORAL at 08:01

## 2017-01-10 RX ADMIN — HYDRALAZINE HYDROCHLORIDE 75 MG: 25 TABLET ORAL at 01:01

## 2017-01-10 RX ADMIN — VITAMIN D, TAB 1000IU (100/BT) 1000 UNITS: 25 TAB at 07:01

## 2017-01-10 RX ADMIN — METOPROLOL SUCCINATE 100 MG: 100 TABLET, FILM COATED, EXTENDED RELEASE ORAL at 08:01

## 2017-01-10 RX ADMIN — SIMVASTATIN 20 MG: 20 TABLET, FILM COATED ORAL at 08:01

## 2017-01-10 RX ADMIN — MAGNESIUM HYDROXIDE 2400 MG: 400 SUSPENSION ORAL at 11:01

## 2017-01-10 RX ADMIN — STANDARDIZED SENNA CONCENTRATE AND DOCUSATE SODIUM 1 TABLET: 8.6; 5 TABLET, FILM COATED ORAL at 07:01

## 2017-01-10 RX ADMIN — ASPIRIN 325 MG: 325 TABLET, DELAYED RELEASE ORAL at 07:01

## 2017-01-10 NOTE — PROGRESS NOTES
BLADDER  [] Pt. uses toilet (7)  [] Pt. is on dialysis - does not urinate (7)  [] Pt. uses bedside commode (5)  []Pt. uses bedpan - staff does____% (includes rolling on/off, holding bedpan in place                                                                   and emptying pan)   [x] Pt. uses urinal - staff empties                          []   Pt. needs help with positioning the urinal (4)                          []   Pt. needs help holding the urinal (2)  []  Pt. has fernandez catheter/suprapubic catheter - staff empties (1)  []  Pt. Is on ICP program:                           []  Pt. does catheterization (6)                           [] Staff does catheterization (1)  [] Pt. Is incontinent - staff does _______% of tasks  Number of accidents during this shift _____    BOWEL  [x] Pt. uses toilet (7)  [] Pt. uses bedside commode (5)  [] Pt. uses bedpan - staff does _______% of task  [] Pt. is on bowel program::                         []   Pt. inserts suppository (6)                         []   Nurse inserts suppository (4)                         []  Nurse does dig. stim. (1)  [] Pt. has colostomy - staff maintains care and empties (1)                       Pt. does ____% of care  [] Pt. is incontinent - staff does ______% of tasks  [] No bowel movement during this shift  [] Number of accidents during this shift    EATING  [x] Pt. opens packages, cuts food, pours liquids, and feeds self, regular consistency (7)  [] Pt. needs dentures, swallow technique, special foods or drink consistency (6)  [] Pt. needs supervision (cueing), setup (open containers, cut food, etc.) (5)  []Pt. needs assist with occasional scooping, or checking for food pocketing (75-90%) (4)  []Pt. needs assist to lead each bite on utensils, patient brings food to mouth (50-74%)(3)  []Pt. needs assist to scoop, bring food to mouth (hand over hand) (25-49%) (2)  []AxillaryPt. Is receiving IV fluids for hydration or tube feeding  (1)    GROOMING  []Pt. performs all tasks independently and safely (7)  []Pt. obtains all articles, needs adaptive/assistive device (wash mitt, etc.) (6)  []Pt. needs supervision (cueing), setup (5)  []Pt. doing 3 of 4 or 4 of 5 tasks, needs assist to put in or remove dentures, steadying (Patient doing 75-90%) (4)  []Pt. doing 2 of 4 or 3 of 5 tasks (3)  []Pt. doing 1 of 4 of 2 of 5 tasks (25-49%) (2)  []Pt. doing 0 of tasks, needs assistance of 2 helpers (1)  []Activity occurred with OT    BATHING  []Pt. safety bathes whole body (10 parts of 10) (7)  []Pt. doing 10 of 10 body parts, uses adaptive devices (wash mitt, etc.) (6)  []Pt. doing 10 of 10 body parts or needs supervision or setup (5)  []Pt. doing 8-9 of 10 body parts (75-99%) (4)  []Pt. doing 5-7 of 10 body parts (50-74%) (3)  []Pt. doing 3-4 of 10 body parts (25-49%( (2)  []Pt. doing 0-2 of 10 body parts (0-24%0 (1)  []Activity occurred with OT    DRESSING UPPER BODY  []Pt. dresses or undresses independently, obtaining clothes from storage area (7)  []Pt. needs adaptive devices (6)  []Pt. needs supervision (cueing), setup (5)  []Pt. doing 75-99% (4)  []Pt. doing 50-74% (3)  []Pt. doing 25-49% (2)  []Pt. doing 0-24% or needs assist of 2 helpers (1)  []Activity occurred with OT    DRESSING LOWER BODY  []Pt. dresses or undresses independently (7)  []Pt. needs adaptive devices (6)  []Pt. needs supervision (cueing), set up helper applies SESAR hose or AFO (5)  []Pt. doing 75-99%, needs steadying assist, fastening a belt, etc.) (4)  []Pt. doing 50-74% (3)  []Pt. doing 25-49% (2)  []Pt. doing 0-24% or needs assist of 2 helpers (1)    TOILETING  []Pt. doing 3 of 3 tasks (pulling down pants, cleaning meaghan area, pulling up pants) (7)  []Pt. doing all 3 parts but needs assistive device (grab bar) (6)  []Pt. needs supervision or setup (5)  []Pt. doing 3 of 3 parts (75-99%) (4)  []Pt. doing 2 of 3 parts (50-74%) (3)  []Pt. doing 1 of 3 parts (25.49%) (2)  []Pt. needs  assist (more than steadying) with all 3 parts or needs assist of 2 helpers,  Incontinent of B/B today (0-24%) (1)    BED/ CHAIR/WHEELCHAIR TRANSFER  []Pt. transfers without assistive device into and out of wheelchair/bed/chair (7)  []Pt. uses assistive/adaptive devices (grab bars, sliding board, walker, bed rails,   wheelchair arm rests, etc.) (6)  []Pt. needs supervision, cues, head of bed raised by staff (5)  []Pt. needs steadying assist with any or all parts of transfer, needs assist with one   leg during transfer ( 4)  []Pt. needs lifting or lowering, needs assist with 2 legs during transfer (3)  []Pt. needs lifting and lowering (2)  []Pt. needs assist of 2 helpers(even if 2nd person is just there for safety) or mechanical lift (1)  []Activity did not occur     TOILET TRANSFER  []Pt. transfers from wheelchair or walking without assistive device (7)  []Pt. uses assistive/adaptive device (commode, grab bars, sliding board, walker,   prosthesis, orthotic, raised toilet seat, etc.) (6)  []Pt. needs supervision, cues, or setup (needs assist to lock brakes, remove leg or arm  rest, position sliding board) (5)  []Pt. needs steadying assist with any or all parts of transfer, needs assist with one leg  during transfer (4)  []Pt. needs lifting or lowering, needs assist with two legs during transfer (3)  []Pt. needs lifting and lowering (2)  []Pt. needs assist of 2 helpers or mechanical lift (1)  []Activity did not occur    SHOWER TRANSFER  []Pt. transfers without assistive device into and out of shower (7)  []Pt. uses assistive/adaptive device (shower bench, grab bars, etc.) (6)  []Pt. needs supervision, cues, or setup (5)  []Pt. needs steadying assist with any or all parts of transfer, needs assist with one leg  during transfer (4)  []Pt. needs lifting or lowering, needs assist with 2 legs during transfer (3)  []Pt. needs lifting and lowering (2)  []Pt. needs assist of 2 helpers, helper pushes shower chair on wheels in  and out of  shower (1)

## 2017-01-10 NOTE — PLAN OF CARE
Problem: Patient Care Overview  Goal: Plan of Care Review  Outcome: Ongoing (interventions implemented as appropriate)  Safety:  call light in reach, patient oriented to room & instructed how to notify nurse if assistance is needed, current questions/concerns addressed, bed in lowest position with wheels locked & side rails up X 2, fall precautions followed, patient free from fall/injury this shift.  VTE/bleeding precautions maintained.  Activity:  patient up with assistance, weight shifted at least every other hour.  Neurological:  A&OX4, follows commands, equal  strength & dorsi/plantarflexion.  Respiratory:  Tolerates room air without distress.  Cardiac:  BP stable.  HR stable.  Afebrile this shift.  Intake/Output:  Pt. tolerates diet well, denies nausea.  LBM 1/3/2017, patient states he only goes once every 5 to 7 days and refused a suppository, encouraged PO fluids.  Pt. voids spontaneously and without difficulty clear yellow urine without foul odor and with adequate output for shift.  Pain:  Patient received pain medication as ordered.  Skin:  telfa island dressings CDI to BLE.  Devices:  No LDAs in place.  Will continue to monitor patient.

## 2017-01-10 NOTE — PROGRESS NOTES
Physical Therapy  Daily FIM Scores    Doug Garibay   MRN: [id number]      01/10/17 1015   Transfers   Bed/Chair/WC 6   Locomotion   Distance Walked 2   Distance Wheelchair 3   Walk 2   Wheelchair 5   Mode C   Stairs 0       Micki Carranza, ANAI  1/10/2017

## 2017-01-10 NOTE — PROGRESS NOTES
Pt without new c/o.    Interval hx:  I have reviewed the pt's HPI and PFSH and it is unchanged from admission.     VSS - Temp:  [98.7 °F (37.1 °C)-99.6 °F (37.6 °C)] 98.7 °F (37.1 °C)  Pulse:  [93-95] 95  Resp:  [17-18] 18  BP: (132-144)/(69-78) 132/69    Review of Systems   Eyes: Negative for blurred vision.   Respiratory: Negative for cough.   Cardiovascular: Negative for chest pain.   Gastrointestinal: Negative for nausea and vomiting.   Genitourinary: Negative for dysuria.   Musculoskeletal: Positive for joint pain. Negative for myalgias.   Skin: Negative.   Neurological: Positive for weakness. Negative for dizziness, sensory change and headaches.   Psychiatric/Behavioral: Negative for depression. The patient has insomnia.      Physical Exam   Constitutional: He is oriented to person, place, and time. He appears well-nourished.   Eyes: EOM are normal. Pupils are equal, round, and reactive to light.   Neck: Normal range of motion. Neck supple.   Cardiovascular: Normal rate.   Pulmonary/Chest: Effort normal.   Abdominal: Soft.   Musculoskeletal:   BUEs AROM WNL  BLEs AROM diminished at the knees and with HF   Neurological: He is oriented to person, place, and time.   BUEs 5/5  BLEs 4/5 HF, 4+/5 HE, 4/5 KF/KE, 5/5 DF/PF   Skin: No rash noted.   Psychiatric: He has a normal mood and affect.     Assessment:    1. S/P bilateral TKAs -- no signs/infxn.  Sit-->stand MNA (improved), T/Fs MNA (improved), gait 77' CGA (improved).   L  deg, ext -2 deg.  R  deg, ext -7 deg.  UBD SBA, LBD MDA on 1/9.  Pain is bit limiting in therapy -- added Oxycontin 10mg daily for a few days.  2. HTN -- controlled.  Discussed changing hydralazine 75mg TID to Toprol XL to simplify his regimen some -- pt agreeable.    3. CKD -- stable.  CRT 1.9 on 1/10, slightly prerenal.  Will encourage fluids, ck again 1/12.        Future Appointments  Date Time Provider Department Center   1/18/2017 1:30 PM [facility] [address]

## 2017-01-10 NOTE — PLAN OF CARE
Problem: Patient Care Overview  Goal: Plan of Care Review  Outcome: Ongoing (interventions implemented as appropriate)  POC reviewed with pt who verbalized understanding. Pt AAOX4. Remains free of falls and injury. VSS and afebrile. Pt on regular diet tolerating well, denies nausea. C/o slight pain controlled with PRN medication. Encourage pt repositioned to help decrease pressure. Bilateral knees dressing, CDI. Pt denies numbness or tingling at this time. No acute events. No distress noted. WCTM.        Problem: Fall Risk (Adult)  Goal: Identify Related Risk Factors and Signs and Symptoms  Related risk factors and signs and symptoms are identified upon initiation of Human Response Clinical Practice Guideline (CPG)   Outcome: Ongoing (interventions implemented as appropriate)  Bed locked and low with call light within reach. Will continue to monitor    Problem: Pain, Acute (Adult)  Goal: Identify Related Risk Factors and Signs and Symptoms  Related risk factors and signs and symptoms are identified upon initiation of Human Response Clinical Practice Guideline (CPG)   Outcome: Ongoing (interventions implemented as appropriate)  Pt is receiving adequate pain relief from ordered analgesics

## 2017-01-10 NOTE — PROGRESS NOTES
Occupational Therapy  FIM SCORES    Doug Garibay  MRN: [id number]  Room/Bed: E255/E255 B       01/10/17 1600   Transfers   Bed/Chair/WC 4   Self Care   Eating 7   Grooming 5   Bathing 4   Dressing-Upper 5   Dressing-Lower 3   Toileting 4       MONA Meehan  1/10/2017

## 2017-01-10 NOTE — PROGRESS NOTES
Physical Therapy   Treatment/Transfer Group    Doug Garibay   MRN: [id number]                  01/10/17 1310   PT Time Calculation   PT Start Time 1310   PT Stop Time 1400   PT Total Time (min) 50 min   Treatment   Treatment Type Treatment;Other (see comments)  (Transfer Group)   PT/PTA PT   General   PT Received On 01/10/17   Family/Caregiver Present No   Patient Found (position) Supine in bed   Precautions   General Precautions fall   RLE Weight Bearing WBAT   LLE Weight Bearing WBAT   Subjective   Patient states he didn't sleep well last night.   Pain/Comfort   Pain Rating 4/10   Location - Side Bilateral   Location - Orientation generalized   Location knee   Pain Addressed Distraction   Pain Rating Post-Intervention 4/10   Bed Mobility   Bed Mobility yes   Supine to Sit Modified Independent   Transfers   Transfer yes   Bed to Chair   Bed <> Chair Technique Stand Pivot   Bed <> Chair Assistance Contact Guard Assistance  (pt declined to use RW)   Bed <> Chair Assistive Device No Assistive Device   Trials/Comments 1 trial   Activity Tolerance   Activity Tolerance Patient tolerated treatment well   After Treatment   Patient Position After Treatment Seated in wheelchair   Patient after treatment left (seat belt in place)   Assessment   Prognosis Good   Plan   Planned Therapy Intervention Continue with current plan   Therapy Frequency 2 times/day;daily;Monday-Friday;Saturday or Sunday   Physical Therapy Follow-up   PT Follow-up? Yes   PT - Next Visit Date 01/11/17   Treatment/Billable Minutes   Therapeutic Activity Group 50   Total Time 50             Patient participated in 45 minute functional transfers group. The group activity challenged bilateral upper extremity and lower extremity strengthening. In addition, cardiovascular and functional endurance were challenged during this group.  Pt performs beach ball toss with other patients and various UE dowel exercises and LE exercises from w/c. Stand pivot transfer  w/c to mat with min A; mat to w/c with min/CGA( no AD used per pt request). Patient completed bed mobility from supine to sitting edge of mat with __modified independence. Pt required _minimal____ assistance for w/c management parts. Patient completed toilet transfer with _minimal_____ assistance and no AD.  Pt completed (5) sit to stand transfer(s) from the  w/c with CGA . Patient demonstrated slightly decreased safety awareness with functional transfers. Educated patient on compensatory and adaptive techniques for functional home transfers. Patient demonstrated increased independence with all transfers (or list specific transfers). These activities were performed in a group setting to encourage increased participation with peers and social interaction skills.          Grant Romano, PT 1/10/2017

## 2017-01-10 NOTE — PROGRESS NOTES
"Occupational Therapy  AM & PM Treatment  Doug Garibay   MRN: [id number]   Room/Bed: E255/E255 B       01/10/17 1104 01/10/17 1400   OT Time Calculation   OT Start Time 1104 1400   OT Stop Time 1154 9328   OT Total Time (min) 50 min 48 min   General   OT Date of Treatment 01/10/17 --    Family/Caregiver Present No --    Patient Found (position) Seated in wheelchair --    Precautions   General Precautions fall --    RLE Weight Bearing WBAT --    LLE Weight Bearing WBAT --    Subjective   Patient states "I'm going to throw up!"  --    Pain/Comfort   Pain Rating 5/10 --    Location knee --    Pain Addressed Pre-medicate for activity --    Bed Mobility   Bed Mobility --  yes   Sit to Supine --  Supervision   Sit to Supine Comments --  to flat bed   Sit to Stand   Sit <> Stand Assistance Minimum Assistance --    Sit <> Stand Assistive Device Rolling Walker --    Trials/Comments from  --    Stand to Sit   Assistance Minimum Assistance --    Assistive Device Rolling Walker --    Trials/Comments to  --    Bed to Chair   Bed <> Chair Technique --  Stand Pivot   Bed <> Chair Transfer Assistance --  Minimum Assistance   Bed <> Chair Assistive Device --  Rolling Walker   Trials/Comments --  from wc>EOB   Feeding   Feeding Level of Assistance Independent --    Feeding Where Assessed Wheelchair --    Grooming   Grooming Level of Assistance Modified independent --    Grooming Where Assessed Wheelchair --    Exercise Tools   Exercise Tools --  Yes   UE Ergometer --  12min with moderate resistance   Rickshaw 30# x 100 reps --    Bilateral Marty 7# x 100 reps --    Other Exercise Tool 1 --  Unigym: chest press, rows 20# x 100 reps, fly 10# x 100 reps   Dynamic Standing Balance   Dynamic Standing-Balance Support Unilateral upper extremity supported --    Dynamic Standing-Balance Reaching for objects;Reaching across midline --    Dynamic Standing-Comments CGA to complete card matching board with 2 seated rest breaks --  "   Activity Tolerance   Activity Tolerance Patient tolerated treatment well --    Medical Staff Made Aware Pt became ill at end of AM tx session and was pushed to bathroom where he proceeded to vomit. Pt was pushed next to bed when finished and reported feeling better. RN/MD notified. --    After Treatment   Patient Position After Treatment Seated in wheelchair --    Patient after treatment left call button in reach --    Assessment   Prognosis Good --    Problem List Decreased Self Care skills;Decreased upper extremity strength;Decreased endurance;Decreased functional mobility;Decreased IADLs --    Assessment Pt participated well in tx session but became ill near end of AM tx sesssion. Pt was alble to fully participate, however. Pt would continue to benefit from skilled OT services. --    Level of Motivation/Participation Good --    Discharge Recommendations   Equipment Needed After Discharge 3-in-1 commode;shower chair;walker, rolling;wheelchair --    Discharge Facility/Level Of Care Needs outpatient PT --    Plan   Plan Self care retraining;Functional transfer training;UE thereex;Equipment Training;Safety training;Functional endurance training;Patient education;Postural control;AROM;Functional Standing Activities --    Therapy Frequency 2 times/day --    Occupational Therapy Follow-up   OT Follow-up? Yes --    Treatment/Billable Minutes   Self Care/Home Management 14 --    Therapeutic Exercise --  48   Neuromuscular Re-ed 36 --    Total Time 50 48       MONA Meehan  1/10/2017    LEGEND:   CGA: Contact Guard Assist   EOB: Edgeof Bed   HHA: Hand Held Assist   HOB: Head of Bed   (I): Independent-patient performs task in a timely manner   Max (A): Maximal Assist-patient performs 25-49% of task   Min (A): Minimal Assist- patient performs 75% or more of task   Mod (A): Moderate Assist- patient performs 50-74% of task   NA: Not applicable   NT: Not tested   OOB: Out of Bed   PTA: Prior to admit   QC: Quad Cane    RW: Rolling Walker   (S): Supervision- patient requires cues, coaxing, prompting   SBA: Stand By Assist   SC: Straight Cane   SW: Standard Walker   TBA: To be assessed   Total (A): Total Assist- patient performs less than 25% of task   WC: Wheelchair   WFL: Within Functional Limits   WNL: Within Normal Limits

## 2017-01-10 NOTE — PROGRESS NOTES
Physical Therapy   Treatment    Doug Garibay   MRN: [id number]   PTA visit #: 1   01/10/17 1015   PT Time Calculation   PT Start Time 1015   PT Stop Time 1100   PT Total Time (min) 45 min   Treatment   Treatment Type Treatment   PT/PTA PTA   General   PT Received On 01/10/17   Family/Caregiver Present No   Patient Found (position) Seated at edge of bed   Precautions   General Precautions fall   Orthopedic Yes   RLE Weight Bearing WBAT   LLE Weight Bearing WBAT   Subjective   Patient states Pt c/o fatigue   Pain/Comfort   Pain Rating 4/10   Location - Side Bilateral   Location knee   Pain Addressed Distraction   Bed Mobility   Bed Mobility yes   Supine to Sit Modified Independent   Supine to Sit Comments mat   Sit to Supine Modified Independent   Transfers   Transfer yes   Sit to Stand   Sit <> Stand Assistance Stand By Assistance;Contact Guard Assistance   Sit <> Stand Assistive Device Rolling Walker   Stand to Sit   Assistance Stand By Assistance   Assistive Device Rolling Walker   Chair to Mat   Chair<> Mat Technique Stand Pivot   Chair<>Mat Assistance Contact Guard Assistance   Chair <> Mat Assistive Device Rolling Walker   Mat to Chair   Technique Squat Pivot   Assistance Stand By Assistance   Assistive Device No Assistive Device   Wheelchair Activities   Propulsion Yes   Propulsion Type 1 Manual   Level 1 Level tile   Method 1 Right upper extremity;Left upper extremity   Level of Assistance 1 Stand by assistsance   Description/ Details 1 200'   Gait   Gait Yes   Weight Bearing Status WBAT   Gait 1   Surface 1 Level tile   Gait Assistive Device Rolling walker   Assistance 1 Stand by Assistance   Gait Distance 81' x 2   Gait Pattern swing-to gait   Gait Deviation(s) decreased nicko;decreased step length;decreased stride length;decreased toe-to-floor clearance;decreased weight-shifting ability;forward lean   Impairments Contributing to Gait Deviations impaired balance;decreased flexibility;pain;impaired  postural control;decreased ROM;decreased strength   Stairs   Stairs No   Static Sitting Balance   Static Sitting-Balance Support Feet supported   Static Sitting-Level of Assistance Stand by Assistance   Static Sitting-Comment/# of Minutes EOM   Supine   Supine-Exercises Lower extremity;Specific exercises   Supine-Exercise Type Ankle pumps;Quad sets;Glut sets   Supine-Exercise Comments x 15   Seated   Seated-Exercises Lower extremity;Specific exercises   Seated-Exercise Type Long arc quads;Knee flex   Seated-Exercise Comments B LE 2 x 15 reps       ROM: Left Knee   (Degrees) Extension -3   (Degrees) Flexion 105       ROM: Right Knee   (Degrees) Extension -5   (Degrees) Flexion 100   Activity Tolerance   Activity Tolerance Patient tolerated treatment well;Patient limited by fatigue   After Treatment   Patient Position After Treatment Seated in wheelchair   Assessment   Prognosis Good   Session Assessment progressing toward goals   Assessment Pt martha tx well but falling asleep due to c/o not sleeping well last night.  Cont POC   Level of Motivation/Participation good   Barriers to Discharge Inaccessible home environment;Decreased caregiver support   Discharge Recommendations   Equipment Needed After Discharge 3-in-1 commode;shower chair;walker, rolling;wheelchair   Discharge Facility/Level Of Care Needs outpatient PT   Plan   Planned Therapy Intervention Continue with current plan   Therapy Frequency 2 times/day;Monday-Friday;Saturday or Sunday   Physical Therapy Follow-up   PT Follow-up? Yes   Treatment/Billable Minutes   Gait training 15   Therapeutic Activity 15   Therapeutic Exercise 15   Total Time 45       Micki Carranza, ANAI  1/10/2017

## 2017-01-11 PROCEDURE — 25000003 PHARM REV CODE 250: Performed by: STUDENT IN AN ORGANIZED HEALTH CARE EDUCATION/TRAINING PROGRAM

## 2017-01-11 PROCEDURE — 97150 GROUP THERAPEUTIC PROCEDURES: CPT

## 2017-01-11 PROCEDURE — 97110 THERAPEUTIC EXERCISES: CPT

## 2017-01-11 PROCEDURE — 97112 NEUROMUSCULAR REEDUCATION: CPT

## 2017-01-11 PROCEDURE — 25000003 PHARM REV CODE 250: Performed by: PHYSICAL MEDICINE & REHABILITATION

## 2017-01-11 PROCEDURE — 99233 SBSQ HOSP IP/OBS HIGH 50: CPT | Mod: ,,, | Performed by: PHYSICAL MEDICINE & REHABILITATION

## 2017-01-11 PROCEDURE — 97116 GAIT TRAINING THERAPY: CPT

## 2017-01-11 PROCEDURE — 12800000 HC REHAB SEMI-PRIVATE ROOM

## 2017-01-11 RX ADMIN — NIFEDIPINE 60 MG: 30 TABLET, FILM COATED, EXTENDED RELEASE ORAL at 08:01

## 2017-01-11 RX ADMIN — FAMOTIDINE 20 MG: 20 TABLET, FILM COATED ORAL at 08:01

## 2017-01-11 RX ADMIN — FERROUS SULFATE TAB EC 325 MG (65 MG FE EQUIVALENT) 325 MG: 325 (65 FE) TABLET DELAYED RESPONSE at 08:01

## 2017-01-11 RX ADMIN — POLYETHYLENE GLYCOL 3350 17 G: 17 POWDER, FOR SOLUTION ORAL at 08:01

## 2017-01-11 RX ADMIN — LACTULOSE 20 G: 10 SOLUTION ORAL at 03:01

## 2017-01-11 RX ADMIN — OXYCODONE HYDROCHLORIDE 15 MG: 5 TABLET ORAL at 10:01

## 2017-01-11 RX ADMIN — SIMVASTATIN 20 MG: 20 TABLET, FILM COATED ORAL at 08:01

## 2017-01-11 RX ADMIN — LEVOTHYROXINE SODIUM 150 MCG: 75 TABLET ORAL at 06:01

## 2017-01-11 RX ADMIN — ASPIRIN 325 MG: 325 TABLET, DELAYED RELEASE ORAL at 08:01

## 2017-01-11 RX ADMIN — VITAMIN D, TAB 1000IU (100/BT) 1000 UNITS: 25 TAB at 08:01

## 2017-01-11 RX ADMIN — PREGABALIN 75 MG: 75 CAPSULE ORAL at 08:01

## 2017-01-11 RX ADMIN — STANDARDIZED SENNA CONCENTRATE AND DOCUSATE SODIUM 1 TABLET: 8.6; 5 TABLET, FILM COATED ORAL at 08:01

## 2017-01-11 RX ADMIN — OXYCODONE HYDROCHLORIDE 10 MG: 5 TABLET ORAL at 05:01

## 2017-01-11 RX ADMIN — METOPROLOL SUCCINATE 100 MG: 100 TABLET, FILM COATED, EXTENDED RELEASE ORAL at 08:01

## 2017-01-11 NOTE — PROGRESS NOTES
"Occupational Therapy  PM Treatment  Doug Garibay   MRN: [id number]   Room/Bed: E255/E255 B       01/11/17 1302   OT Time Calculation   OT Start Time 1302   OT Stop Time 1348   OT Total Time (min) 46 min   General   OT Date of Treatment 01/11/16   Family/Caregiver Present Yes   Patient Found (position) Seated in wheelchair   Precautions   General Precautions fall   Orthopedic Yes   RLE Weight Bearing WBAT   LLE Weight Bearing WBAT   Subjective   Patient states "My legs and feet are swollen today, but I feeling alright"    Pain/Comfort   Pain Rating 4/10   Location - Side Bilateral   Location knee   Pain Addressed Pre-medicate for activity   Pain Comment in standing   Sit to Stand   Sit <> Stand Assistance Contact Guard Assistance   Sit <> Stand Assistive Device Rolling Walker   Trials/Comments from w/c   Stand to Sit   Assistance Contact Guard Assistance   Assistive Device Rolling Walker   Trials/Comments to w/c   Exercise Tools   Exercise Tools Yes   Bilateral Marty 7# x 100 reps with rest breaks   Dynamic Standing Balance   Dynamic Standing-Balance Support Unilateral upper extremity supported   Dynamic Standing-Balance Reaching across midline;Reaching for objects   Dynamic Standing-Comments CGA to complete shape matching board game while standing at countertop with 3 seated rest breaks   After Treatment   Patient Position After Treatment Seated in wheelchair   Patient after treatment left (Pt left seated in w/c in therapy gym with PT notified)   Assessment   Prognosis Good   Problem List Decreased Self Care skills;Decreased upper extremity strength;Decreased endurance;Decreased functional mobility;Decreased IADLs   Assessment Pt tolerated tx session well. Pt tolerated dynamic standing therapeutic ax for increased time, but c/o moderate pain in bilateral knees. Pt continues to require rest breaks throughout tx session 2' to decreased endurance and pain. Pt would benefit from skilled OT services to address " endurance, safety with t/fs, functional mobility, self care skills, and IADLs.    Level of Motivation/Participation Good   Discharge Recommendations   Equipment Needed After Discharge 3-in-1 commode;walker, rolling;wheelchair;shower chair   Discharge Facility/Level Of Care Needs outpatient PT   Plan   Plan Self care retraining;Functional transfer training;UE thereex;Equipment Training;Safety training;Functional endurance training;Patient education;Postural control;AROM;Functional Standing Activities   Therapy Frequency 2 times/day   Occupational Therapy Follow-up   OT Follow-up? Yes   Treatment/Billable Minutes   Therapeutic Exercise 14   Neuromuscular Re-ed 32   Total Time 46       MONA Meehan  1/11/2017    LEGEND:   CGA: Contact Guard Assist   EOB: Edgeof Bed   HHA: Hand Held Assist   HOB: Head of Bed   (I): Independent-patient performs task in a timely manner   Max (A): Maximal Assist-patient performs 25-49% of task   Min (A): Minimal Assist- patient performs 75% or more of task   Mod (A): Moderate Assist- patient performs 50-74% of task   NA: Not applicable   NT: Not tested   OOB: Out of Bed   PTA: Prior to admit   QC: Quad Cane   RW: Rolling Walker   (S): Supervision- patient requires cues, coaxing, prompting   SBA: Stand By Assist   SC: Straight Cane   SW: Standard Walker   TBA: To be assessed   Total (A): Total Assist- patient performs less than 25% of task   WC: Wheelchair   WFL: Within Functional Limits   WNL: Within Normal Limits

## 2017-01-11 NOTE — PLAN OF CARE
Problem: Pain, Acute (Adult)  Goal: Acceptable Pain Control/Comfort Level  Patient will demonstrate the desired outcomes by discharge/transition of care.   Outcome: Ongoing (interventions implemented as appropriate)  Patient pain has been controlled by PRN pain med. See MAR

## 2017-01-11 NOTE — PROGRESS NOTES
Enema given as ordered, pt instructed to hold as lo9ng as possible, verbalizes understanding, instructed to call when feels need for bm for assistance. bsc at bs

## 2017-01-11 NOTE — PLAN OF CARE
Problem: Fall Risk (Adult)  Goal: Absence of Falls  Patient will demonstrate the desired outcomes by discharge/transition of care.   Outcome: Ongoing (interventions implemented as appropriate)  No fall or injury noted this shift

## 2017-01-11 NOTE — PROGRESS NOTES
Physical Therapy   Daily FIM Scores    Doug Garibay   MRN: [id number]      01/11/17 1350   Transfers   Bed/Chair/WC 5   Locomotion   Distance Walked 2   Distance Wheelchair 3   Walk 2   Wheelchair 5   Mode C   Stairs 2         Micki Carranza, ANAI  1/11/2017

## 2017-01-11 NOTE — PROGRESS NOTES
Occupational Therapy  FIM SCORES    Doug Garibay  MRN: [id number]  Room/Bed: E255/E255 B       01/11/17 1400   Transfers   Bed/Chair/WC 4   Self Care   Eating 7   Grooming 5   Bathing 4   Dressing-Upper 5   Dressing-Lower 3   Toileting 4       MONA Meehan  1/11/2017

## 2017-01-11 NOTE — PROGRESS NOTES
Physical Therapy   Treatment    Doug Garibay   MRN: [id number]   PTA visit #: 1     01/11/17 0830   PT Time Calculation   PT Start Time 0830   PT Stop Time 0915   PT Total Time (min) 45 min   Treatment   Treatment Type Treatment   PT/PTA PTA   PTA Visit Number 1   General   PT Received On 01/11/17   Family/Caregiver Present No   Patient Found (position) Seated at edge of bed   Precautions   General Precautions fall   Orthopedic Yes   RLE Weight Bearing WBAT   LLE Weight Bearing WBAT   Subjective   Patient states Pt feeling better today with less fatigue   Pain/Comfort   Pain Rating 2/10   Location - Side Bilateral   Location - Orientation generalized   Location knee   Pain Addressed Distraction   Bed Mobility   Bed Mobility yes   Supine to Sit Modified Independent   Supine to Sit Comments mat   Sit to Supine Modified Independent   Transfers   Transfer yes   Sit to Stand   Sit <> Stand Assistance Stand By Assistance   Sit <> Stand Assistive Device Rolling Walker   Stand to Sit   Assistance Stand By Assistance   Assistive Device Rolling Walker   Bed to Chair   Bed <> Chair Technique Squat Pivot   Bed <> Chair Assistance Contact Guard Assistance   Bed <> Chair Assistive Device No Assistive Device   Mat to Chair   Technique Stand Pivot   Assistance Stand By Assistance   Assistive Device No Assistive Device   Wheelchair Activities   Propulsion Yes   Propulsion Type 1 Manual   Level 1 Level tile   Method 1 Right upper extremity;Left upper extremity   Level of Assistance 1 Supervision   Description/ Details 1 200'   Gait   Gait Yes   Weight Bearing Status WBAT   Gait 1   Surface 1 Level tile   Gait Assistive Device Rolling walker   Assistance 1 Stand by Assistance   Gait Distance 66'   Gait Pattern swing-to gait   Gait Deviation(s) decreased nicko;increased time in double stance;decreased velocity of limb motion;decreased step length;decreased stride length;decreased swing-to-stance ratio;decreased toe-to-floor  clearance;decreased weight-shifting ability;foot flat;forward lean   Impairments Contributing to Gait Deviations impaired balance;decreased flexibility;pain;impaired postural control;decreased ROM;decreased strength   Supine   Supine-Exercises Lower extremity;Specific exercises   Supine-Exercise Type Ankle pumps;Glut sets;Short arc quads;Bridging with bolster;ABD/ADD;Heel slides   Supine-Exercise Comments B LE 2 X 15   Activity Tolerance   Activity Tolerance Patient tolerated treatment well   After Treatment   Patient Position After Treatment Seated in wheelchair   Assessment   Prognosis Good   Problem List Decreased strength;Decreased range of motion;Decreased endurance;Impaired balance;Decreased mobility   Session Assessment progressing toward goals   Assessment Pt martha tx well with less fatigue.  Cont POC   Level of Motivation/Participation good   Barriers to Discharge Inaccessible home environment;Decreased caregiver support   Plan   Planned Therapy Intervention Continue with current plan   Therapy Frequency 2 times/day;Monday-Friday;Saturday or Sunday   Physical Therapy Follow-up   PT Follow-up? Yes   Treatment/Billable Minutes   Gait training 15   Therapeutic Exercise 30   Total Time 45     Micki Carranza, PTA  1/11/2017

## 2017-01-11 NOTE — PROGRESS NOTES
Pt without new c/o.    Interval hx:  I have reviewed the pt's HPI and PFSH and it is unchanged from admission.     VSS - Temp:  [98.2 °F (36.8 °C)] 98.2 °F (36.8 °C)  Pulse:  [81-85] 85  Resp:  [18] 18  BP: (123-137)/(63-69) 137/69    Review of Systems   Eyes: Negative for blurred vision.   Respiratory: Negative for cough.   Cardiovascular: Negative for chest pain.   Gastrointestinal: Negative for nausea and vomiting.   Genitourinary: Negative for dysuria.   Musculoskeletal: Positive for joint pain. Negative for myalgias.   Skin: Negative.   Neurological: Positive for weakness. Negative for dizziness, sensory change and headaches.   Psychiatric/Behavioral: Negative for depression. The patient has insomnia.      Physical Exam   Constitutional: He is oriented to person, place, and time. He appears well-nourished.   Eyes: EOM are normal. Pupils are equal, round, and reactive to light.   Neck: Normal range of motion. Neck supple.   Cardiovascular: Normal rate.   Pulmonary/Chest: Effort normal.   Abdominal: Soft.   Musculoskeletal:   BUEs AROM WNL  BLEs AROM diminished at the knees and with HF   Neurological: He is oriented to person, place, and time.   BUEs 5/5  BLEs 4/5 HF, 4+/5 HE, 4/5 KF/KE, 5/5 DF/PF   Skin: No rash noted.   Psychiatric: He has a normal mood and affect.     Assessment:    1. S/P bilateral TKAs -- no signs/infxn.  Sit-->stand SBA (improved), T/Fs CGA (improved), gait 81' SBA (improved).   L  deg, ext -2 deg.  R  deg, ext -7 deg.  UBD SBA, LBD SBA on 1/10.  Pain improved with added Oxycontin 10mg.  2. HTN -- controlled.  Changed hydralazine 75mg TID to Toprol XL 50mg to simplify his regimen some -- pt agreeable.    3. CKD -- stable.  CRT 1.9 on 1/10, slightly prerenal.  Will encourage fluids, ck again 1/12.        Future Appointments  Date Time Provider Department Center   1/18/2017 1:30 PM Alice Tony NP [facility] Carrillo

## 2017-01-11 NOTE — PROGRESS NOTES
"Occupational Therapy  Dressing Group/ Treatment    Doug Garibay  MRN: [id number]  Room/Bed: E255/E255 B       01/11/17 1015   OT Time Calculation   OT Start Time 1020   OT Stop Time 1105   OT Total Time (min) 45 min   General   OT Date of Treatment 01/11/17   Precautions   General Precautions fall   Subjective   Patient states "I feel OK."    Sit to Stand   Sit <> Stand Assistance Stand By Assistance;Contact Guard Assistance   Sit <> Stand Assistive Device Rolling Walker   Stand to Sit   Assistance Stand By Assistance;Contact Guard Assistance   Assistive Device Rolling Walker   UE Dressing   UE Dressing Level of Assistance Independent   LE Dressing   LE Dressing Level of Assistance Stand by assistance;Contact guard  (during stance phase to pull pants up.  )   Sock Level of Assistance Supervision   Shoe Level of Assistance Stand by assistance   LE Dressing Comments Unable to fully don R shoe 2* edema to RLE.    Exercise Tools   Eliud 40#- 20 x 5 reps   Other Exercise Tool 1 Lat Pull Downs- 20 #- 60 reps   OT Therapeutic Groups   Other Pt participated in 25 min functional  dressing group. The group activity reviewed safety considerations, energy conservation, and adaptive strategies for upper body and lower body dressing. Patient educated on adaptive equipment available to assist with dressing (button hook, long handled shoe horn, reacher, dressing stick, elastic shoe laces). Patient also educated on dressing tips that promote increased (I) with dressing such as wearing loose fit clothing, using footstool, and adapted clothing available.     These activities were performed in a group setting to encourage participation with peers and social interaction skills.     Pt. Completed dressing tasks very quickly.  Pt. Completed session with ASHISH gallagher-ex with [employer].       Plan   Plan Continue with current plan   Occupational Therapy Follow-up   OT Follow-up? Yes   Treatment/Billable Minutes   Therapeutic " Exercise 20   Therapeutic Group 25   Total Time 45       MONA Garrison  1/11/2017

## 2017-01-11 NOTE — PROGRESS NOTES
Physical Therapy  PM Treatment    Doug Garibay   MRN: [id number]   PTA visit #: 1   01/11/17 1350   PT Time Calculation   PT Start Time 1350   PT Stop Time 1435   PT Total Time (min) 45 min   Treatment   Treatment Type Treatment   PT/PTA PTA   PTA Visit Number 1   General   PT Received On 01/11/17   Family/Caregiver Present Yes   Patient Found (position) Seated in wheelchair   Precautions   General Precautions fall   Orthopedic Yes   RLE Weight Bearing WBAT   LLE Weight Bearing WBAT   Subjective   Patient states Pt agreeable to tx w/o complaints   Pain/Comfort   Pain Rating no pain   Bed Mobility   Bed Mobility yes   Supine to Sit Modified Independent   Supine to Sit Comments mat   Sit to Supine Modified Independent   Transfers   Transfer yes   Sit to Stand   Sit <> Stand Assistance Stand By Assistance   Sit <> Stand Assistive Device Rolling Walker   Stand to Sit   Assistance Stand By Assistance   Assistive Device Rolling Walker   Mat to Chair   Technique Stand Pivot   Assistance Stand By Assistance   Assistive Device No Assistive Device   Wheelchair Activities   Propulsion Yes   Propulsion Type 1 Manual   Level 1 Level tile   Method 1 Right upper extremity;Left upper extremity   Level of Assistance 1 Supervision   Description/ Details 1 200'   Gait   Gait Yes   Weight Bearing Status WBAT   Gait 1   Surface 1 Level tile   Gait Assistive Device Rolling walker   Assistance 1 Stand by Assistance   Gait Distance 80', 100'   Gait Pattern swing-to gait   Gait Deviation(s) decreased nicko;increased time in double stance;decreased velocity of limb motion;decreased step length;decreased stride length;decreased swing-to-stance ratio;decreased toe-to-floor clearance   Impairments Contributing to Gait Deviations impaired balance;decreased flexibility;pain;decreased strength   Stairs   Stairs Yes   Stairs/Curb Step   Rails 1 Bilateral   Device 1 No device   Assistance 1 Contact guard   Comment/# Steps 1 up and down 4 stairs    Stairs/ Curb Step  2   Rails 2 None (Comment)   Device 2 Rolling walker   Assistance 2 Contact Guard Assistance   Comment/# Steps 2 up performed curb step amb with RW and cga x 2 trials   Supine   Supine-Exercises Lower extremity;Specific exercises   Supine-Exercise Type Ankle pumps;Glut sets;Short arc quads;ABD/ADD;Heel slides   Supine-Exercise Comments B LE x 15 reps       ROM: Left Knee   (Degrees) Extension -3   (Degrees) Flexion 105       ROM: Right Knee   (Degrees) Extension -2   (Degrees) Flexion 103   Activity Tolerance   Activity Tolerance Patient tolerated treatment well   After Treatment   Patient Position After Treatment Seated in wheelchair  (family present)   Assessment   Prognosis Good   Problem List Decreased strength;Decreased range of motion;Decreased endurance;Impaired balance;Decreased mobility   Session Assessment progressing toward goals   Assessment Pt martha tx well and performed stair and curb training with cga and no LOB. Cont POC   Level of Motivation/Participation good   Barriers to Discharge Inaccessible home environment;Decreased caregiver support   Discharge Recommendations   Equipment Needed After Discharge 3-in-1 commode;shower chair;walker, rolling;wheelchair   Discharge Facility/Level Of Care Needs outpatient PT   Plan   Planned Therapy Intervention Continue with current plan   Therapy Frequency 2 times/day;Monday-Friday;Saturday or Sunday   Physical Therapy Follow-up   PT Follow-up? Yes   Treatment/Billable Minutes   Gait training 30   Therapeutic Exercise 15   Total Time 45       Micki Carranza, PTA  1/11/2017

## 2017-01-12 ENCOUNTER — TELEPHONE (OUTPATIENT)
Dept: NEPHROLOGY | Facility: CLINIC | Age: 57
End: 2017-01-12

## 2017-01-12 DIAGNOSIS — N17.9 ACUTE KIDNEY INJURY (NONTRAUMATIC): Primary | ICD-10-CM

## 2017-01-12 LAB
ANION GAP SERPL CALC-SCNC: 9 MMOL/L
BUN SERPL-MCNC: 43 MG/DL
CALCIUM SERPL-MCNC: 10.5 MG/DL
CHLORIDE SERPL-SCNC: 99 MMOL/L
CO2 SERPL-SCNC: 24 MMOL/L
CREAT SERPL-MCNC: 1.8 MG/DL
EST. GFR  (AFRICAN AMERICAN): 46.9 ML/MIN/1.73 M^2
EST. GFR  (NON AFRICAN AMERICAN): 40.6 ML/MIN/1.73 M^2
GLUCOSE SERPL-MCNC: 96 MG/DL
POTASSIUM SERPL-SCNC: 5 MMOL/L
SODIUM SERPL-SCNC: 132 MMOL/L

## 2017-01-12 PROCEDURE — 99233 SBSQ HOSP IP/OBS HIGH 50: CPT | Mod: ,,, | Performed by: PHYSICAL MEDICINE & REHABILITATION

## 2017-01-12 PROCEDURE — 97535 SELF CARE MNGMENT TRAINING: CPT

## 2017-01-12 PROCEDURE — 80048 BASIC METABOLIC PNL TOTAL CA: CPT

## 2017-01-12 PROCEDURE — 36415 COLL VENOUS BLD VENIPUNCTURE: CPT

## 2017-01-12 PROCEDURE — 97110 THERAPEUTIC EXERCISES: CPT

## 2017-01-12 PROCEDURE — 97116 GAIT TRAINING THERAPY: CPT

## 2017-01-12 PROCEDURE — 12800000 HC REHAB SEMI-PRIVATE ROOM

## 2017-01-12 PROCEDURE — 97112 NEUROMUSCULAR REEDUCATION: CPT

## 2017-01-12 PROCEDURE — 25000003 PHARM REV CODE 250: Performed by: STUDENT IN AN ORGANIZED HEALTH CARE EDUCATION/TRAINING PROGRAM

## 2017-01-12 PROCEDURE — 25000003 PHARM REV CODE 250: Performed by: PHYSICAL MEDICINE & REHABILITATION

## 2017-01-12 PROCEDURE — 97150 GROUP THERAPEUTIC PROCEDURES: CPT

## 2017-01-12 RX ADMIN — FERROUS SULFATE TAB EC 325 MG (65 MG FE EQUIVALENT) 325 MG: 325 (65 FE) TABLET DELAYED RESPONSE at 08:01

## 2017-01-12 RX ADMIN — STANDARDIZED SENNA CONCENTRATE AND DOCUSATE SODIUM 1 TABLET: 8.6; 5 TABLET, FILM COATED ORAL at 10:01

## 2017-01-12 RX ADMIN — VITAMIN D, TAB 1000IU (100/BT) 1000 UNITS: 25 TAB at 08:01

## 2017-01-12 RX ADMIN — NIFEDIPINE 60 MG: 30 TABLET, FILM COATED, EXTENDED RELEASE ORAL at 08:01

## 2017-01-12 RX ADMIN — LEVOTHYROXINE SODIUM 150 MCG: 75 TABLET ORAL at 08:01

## 2017-01-12 RX ADMIN — POLYETHYLENE GLYCOL 3350 17 G: 17 POWDER, FOR SOLUTION ORAL at 08:01

## 2017-01-12 RX ADMIN — OXYCODONE HYDROCHLORIDE 10 MG: 10 TABLET, FILM COATED, EXTENDED RELEASE ORAL at 08:01

## 2017-01-12 RX ADMIN — FAMOTIDINE 20 MG: 20 TABLET, FILM COATED ORAL at 08:01

## 2017-01-12 RX ADMIN — PREGABALIN 75 MG: 75 CAPSULE ORAL at 10:01

## 2017-01-12 RX ADMIN — SIMVASTATIN 20 MG: 20 TABLET, FILM COATED ORAL at 11:01

## 2017-01-12 RX ADMIN — ASPIRIN 325 MG: 325 TABLET, DELAYED RELEASE ORAL at 08:01

## 2017-01-12 RX ADMIN — STANDARDIZED SENNA CONCENTRATE AND DOCUSATE SODIUM 1 TABLET: 8.6; 5 TABLET, FILM COATED ORAL at 08:01

## 2017-01-12 RX ADMIN — FAMOTIDINE 20 MG: 20 TABLET, FILM COATED ORAL at 10:01

## 2017-01-12 RX ADMIN — ASPIRIN 325 MG: 325 TABLET, DELAYED RELEASE ORAL at 10:01

## 2017-01-12 NOTE — PROGRESS NOTES
Occupational Therapy  [facility]    Doug Garibay  MRN: [id number]  Room/Bed: E255/E255 B       01/12/17 1200   Transfers   Bed/Chair/WC 4   Toilet 5   Tub 5   Shower 5   Self Care   Eating 7   Grooming 7   Bathing 4   Dressing-Upper 7   Dressing-Lower 4   Toileting 4       MONA Meehan  1/12/2017

## 2017-01-12 NOTE — PROGRESS NOTES
"Occupational Therapy  AM & PM Treatment/Reassessment  Doug Garibay   MRN: [id number]   Room/Bed: E255/E255 B     01/12/17 1010 01/12/17 1300   OT Time Calculation   OT Start Time 1010 1300   OT Stop Time 1058 1815   OT Total Time (min) 49 min 47 min   General   OT Date of Treatment 01/12/16 01/12/16   Family/Caregiver Present No Yes   Patient Found (position) Seated in wheelchair Seated in wheelchair   Precautions   General Precautions fall fall   Orthopedic Yes Yes   RLE Weight Bearing WBAT WBAT   LLE Weight Bearing WBAT WBAT   Subjective   Patient states "My pain is better today." "I don't feel as tired this time."    Pain/Comfort   Pain Rating 4/10 3/10   Location - Side Bilateral Bilateral   Location knee knee   Pain Addressed Pre-medicate for activity Pre-medicate for activity   Pain Comment in standing in standing   Bed Mobility   Bed Mobility yes yes   Supine to Sit Modified Independent --    Supine to Sit Comments on mat in therapy gym --    Sit to Supine Supervision Supervision   Sit to Supine Comments --  from EOB to supine   Transfers   Transfer yes yes   Sit to Stand   Sit <> Stand Assistance Stand By Assistance Stand By Assistance   Sit <> Stand Assistive Device Rolling Walker Rolling Walker   Trials/Comments from w/c  from w/c   Stand to Sit   Assistance Stand By Assistance Stand By Assistance   Assistive Device Rolling Walker Rolling Walker   Trials/Comments to w/c to w/c   Bed to Chair   Bed <> Chair Technique Stand Pivot --    Bed <> Chair Transfer Assistance Contact Guard Assistance --    Bed <> Chair Assistive Device Rolling Walker --    Trials/Comments from w/c>supine in bed --    Shower Transfer   Shower Transfer Technique Stand Pivot --    Shower Transfer Assistance Stand By Assistance --    Shower Transfer Assistive Device Rolling Walker --    Trials/Comments from w/c to tub bench --    Toilet Transfer   Toilet Transfer Technique Stand Pivot Stand Pivot   Toilet Transfer Assistance Stand By " Assistance Stand By Assistance   Toilet Transfer Assistive Device Rolling Walker Rolling Walker   Trials/Comments from w/c to bedside commode from w/c   Feeding   Feeding Level of Assistance Independent --    Feeding Where Assessed Wheelchair --    Grooming   Grooming Level of Assistance Independent --    Grooming Where Assessed Wheelchair --    Bathing   Bathing Level of Assistance Minimum assistance --    Bathing Where Assessed Edge of bed --    Bathing Comments steadying assistance --    UE Dressing   UE Dressing Level of Assistance Independent --    UE Dressing Where Assessed Other (Comment) --    UE Dressing Comments Edge of mat with pullover shirt --    LE Dressing   LE Dressing Level of Assistance Contact guard  (SBA for donning pants in standing; CGA for doffing pants) --    LE Dressing Where Assessed Edge of bed --    LE Dressing Comments Steadying assistance for balance --    Exercise Tools   Exercise Tools Yes --    Rickshaw 20# 5x20 reps --    Bilateral Marty 8# 3x20 reps --    Other Exercise Tool 1 Lat [place] 50# 5x20 reps --    Balance   Balance --  Yes   Dynamic Standing Balance   Dynamic Standing-Balance Support --  Unilateral upper extremity supported   Dynamic Standing-Balance --  Reaching for objects;Reaching for weighted objects;Reaching across midline   Dynamic Standing-Comments --  SBA to complete weighted bean bag toss while reaching for objects to the L side and crossing midline x10 minutes   Additional Activities   Additional Activities --  Other (Comment)   Additional Activities Comments --  SBA for functional mobility therapeutic activity as pt. carried pegs around table top and placed them into board in order to address standing endurance and balance x25 minutes. Pt completed 30 x 31ft (930ft total) with 3 seated rest breaks.   Activity Tolerance   Activity Tolerance Patient tolerated treatment well Patient tolerated treatment well   After Treatment   Patient Position After Treatment  Supine in bed Supine in bed   Patient after treatment left call button in reach call button in reach   Assessment   Prognosis Good Good   Problem List Decreased Self Care skills;Decreased endurance;Decreased IADLs;Decreased upper extremity strength;Decreased functional mobility Decreased Self Care skills;Decreased upper extremity strength;Decreased endurance;Decreased functional mobility;Decreased IADLs   Assessment Pt tolerated tx session well today. Pt reported decreased feeling of pain in B knees. Pt continues to progress with functional t/f, but requires minimal contact for donning pants in standing for balance. Pt would benefit from continued skilled OT services.  Pt tolerated tx session well. Pt demonstrated improved standing endurance, as he required three rest breaks during the 45 minute session. Pt. continues to require SBA during t/fs for safety concerns. Pt. would benefit from skilled OT services to increase (I) with t/fs, ADLs, and functional mobility.    Level of Motivation/Participation good good   Long Term Goals   Pt Will Perform Supine To Sit Modified Independently --    Supine to Sit - Met/Not Met Met --    Pt Will Transfer Sit to Stand With stand by assist --    Transfer Sit to stand - Met/Not Met Not Met --    Pt Will Transfer Bed/Chair With stand by assist --    Transfer Bed/Chair - Met/Not Met Not Met --    Pt Will Transfer To Bedside Commode With stand by assist --    Pt Will Perform Eating Independently --    Eating - Met/Not Met Met --    Pt Will Perform Grooming Independently --    Grooming - Met/Not Met Met --    Pt Will Perform Bathing With stand by assistance --    Bathing - Met/Not Met Not Met --    Pt Will Perform UE Dressing Independently --    UE Dressing - Met/Not Met Met --    Pt Will Perform LE Dressing With contact guard assistance --    LE Dressing - Met/Not Met Not Met --    Pt Will Perform Toileting With stand by assistance --    Toileting-Met/Not Met Not Met --    Discharge  Recommendations   Equipment Needed After Discharge 3-in-1 commode;walker, rolling;wheelchair;shower chair 3-in-1 commode;walker, rolling;wheelchair;shower chair   Discharge Facility/Level Of Care Needs outpatient PT outpatient PT   Plan   Plan Self care retraining;UE thereex;Equipment Training;Functional endurance training;Functional transfer training;Safety training;Postural control;Patient education;AROM;Functional Standing Activities Safety training;Equipment Training;Functional transfer training;Self care retraining;UE thereex;Functional endurance training;Postural control;Patient education;AROM;Functional Standing Activities   Therapy Frequency 2 times/day 2 times/day   Occupational Therapy Follow-up   OT Follow-up? Yes Yes   Treatment/Billable Minutes   Self Care/Home Management 25 12   Therapeutic Exercise 24 --    Neuromuscular Re-ed --  35   Total Time 49 47       MONA Meehan  1/12/2017    LEGEND:   CGA: Contact Guard Assist   EOB: Edgeof Bed   HHA: Hand Held Assist   HOB: Head of Bed   (I): Independent-patient performs task in a timely manner   Max (A): Maximal Assist-patient performs 25-49% of task   Min (A): Minimal Assist- patient performs 75% or more of task   Mod (A): Moderate Assist- patient performs 50-74% of task   NA: Not applicable   NT: Not tested   OOB: Out of Bed   PTA: Prior to admit   QC: Quad Cane   RW: Rolling Walker   (S): Supervision- patient requires cues, coaxing, prompting   SBA: Stand By Assist   SC: Straight Cane   SW: Standard Walker   TBA: To be assessed   Total (A): Total Assist- patient performs less than 25% of task   WC: Wheelchair   WFL: Within Functional Limits   WNL: Within Normal Limits

## 2017-01-12 NOTE — PROGRESS NOTES
Pt without new c/o.    Interval hx:  I have reviewed the pt's HPI and PFSH and it is unchanged from admission.     VSS - Temp:  [98.4 °F (36.9 °C)-98.8 °F (37.1 °C)] 98.4 °F (36.9 °C)  Pulse:  [74-89] 74  Resp:  [18] 18  BP: (135-139)/(68-77) 135/77    Review of Systems   Eyes: Negative for blurred vision.   Respiratory: Negative for cough.   Cardiovascular: Negative for chest pain.   Gastrointestinal: Negative for nausea and vomiting.   Genitourinary: Negative for dysuria.   Musculoskeletal: Positive for joint pain. Negative for myalgias.   Skin: Negative.   Neurological: Positive for weakness. Negative for dizziness, sensory change and headaches.   Psychiatric/Behavioral: Negative for depression. The patient has insomnia.      Physical Exam   Constitutional: He is oriented to person, place, and time. He appears well-nourished.   Eyes: EOM are normal. Pupils are equal, round, and reactive to light.   Neck: Normal range of motion. Neck supple.   Cardiovascular: Normal rate.   Pulmonary/Chest: Effort normal.   Abdominal: Soft.   Musculoskeletal:   BUEs AROM WNL  BLEs AROM diminished at the knees and with HF   Neurological: He is oriented to person, place, and time.   BUEs 5/5  BLEs 4/5 HF, 4+/5 HE, 4/5 KF/KE, 5/5 DF/PF   Skin: No rash noted.   Psychiatric: He has a normal mood and affect.     Assessment:    1. S/P bilateral TKAs -- no signs/infxn.  Sit-->stand SBA (improved), T/Fs CGA (improved), gait 100' SBA (improved).   L  deg, ext -2 deg.  R  deg, ext -7 deg.  UBD SBA, LBD SBA on 1/10.  Pain improved with added Oxycontin 10mg.  2. HTN -- controlled.  Changed hydralazine 75mg TID to Toprol XL 50mg to simplify his regimen some -- pt agreeable.    3. CKD -- stable.  CRT 1.8 on 1/12, slightly prerenal.  Will encourage fluids, ck again 1/14.        Future Appointments  Date Time Provider Department Center   1/18/2017 1:30 PM Alice Tony NP [facility] [address]

## 2017-01-12 NOTE — PLAN OF CARE
Problem: Fall Risk (Adult)  Goal: Absence of Falls  Patient will demonstrate the desired outcomes by discharge/transition of care.   Outcome: Ongoing (interventions implemented as appropriate)  Patient remain free from fall or injury this shift. Used call light as instructed for assist. Bed alarm activated. Call light in reach

## 2017-01-12 NOTE — PROGRESS NOTES
Occupational Therapy   Endurance Group    Doug Garibay  MRN: [id number]  Room/Bed: E255/E255        01/12/17 1410   OT Time Calculation   OT Start Time 1410   OT Stop Time 1455   OT Total Time (min) 45 min   General   OT Date of Treatment 01/12/17   Precautions   General Precautions fall   RLE Weight Bearing WBAT   LLE Weight Bearing WBAT   OT Therapeutic Groups   Other Patient participated in 45 minute seated high endurance group. The group activity challenged bilateral upper extremity and lower extremity strengthening. In addition, cardiovascular and functional endurance were challenged during this group.   Patient completed 20 reps x 5 sets shoulder reps, bicep curls, side raises, shoulder flexion, shoulder extension, single punches, double punches, arm circles (in UE circuit)    Patient completed 20 reps x 5 sets hip flexion, knee flexion, knee extension, toe and heel taps, Hip abd/add, seated marches, jumping jacks,  (in LE circuit).    - Alternating punches, side punches, diagonal reaches x 20 reps.    -Stretches and cool down.     Pt required no rest breaks during therapeutic exercises. These activities were performed in a group setting to encourage participation with peers and social interaction skills.    Treatment/Billable Minutes   Therapeutic Group 45   Total Time 45       MONA Garrison  1/12/2017

## 2017-01-12 NOTE — PROGRESS NOTES
Occupational Therapy  [facility]    Doug Garibay  MRN: [id number]  Room/Bed: E255/E255 B       01/12/17 1400   Transfers   Bed/Chair/WC 4   Toilet 5   Tub 5   Shower 5   Self Care   Eating 7   Grooming 7   Bathing 4   Dressing-Upper 7   Dressing-Lower 4   Toileting 4       MONA Meehan  1/12/2017

## 2017-01-12 NOTE — PROGRESS NOTES
Physical Therapy   Treatment    Doug Garibay   MRN: [id number]   PTA visit #: 2   01/12/17 0830   PT Time Calculation   PT Start Time 0830   PT Stop Time 0915   PT Total Time (min) 45 min   Treatment   Treatment Type Treatment   PT/PTA PTA   PTA Visit Number 2   General   PT Received On 01/12/17   Family/Caregiver Present No   Patient Found (position) Seated at edge of bed   Precautions   General Precautions fall   Orthopedic Yes   RLE Weight Bearing WBAT   LLE Weight Bearing WBAT   Subjective   Patient states Pt agreeable to tx with  c/o B knee pain.   Pain/Comfort   Pain Rating 4/10   Location - Side Bilateral   Location - Orientation generalized   Location knee   Pain Addressed Distraction   Bed Mobility   Bed Mobility yes   Supine to Sit Modified Independent   Supine to Sit Comments mat   Sit to Supine Modified Independent   Transfers   Transfer yes   Sit to Stand   Sit <> Stand Assistance Stand By Assistance   Sit <> Stand Assistive Device Rolling Walker   Stand to Sit   Assistance Stand By Assistance   Assistive Device Rolling Walker   Bed to Chair   Bed <> Chair Technique Squat Pivot   Bed <> Chair Assistance Stand By Assistance   Bed <> Chair Assistive Device No Assistive Device   Chair to Mat   Chair<> Mat Technique Stand Pivot   Chair<>Mat Assistance Stand By Assistance   Chair <> Mat Assistive Device No Assistive Device   Mat to Chair   Technique Stand Pivot   Assistance Stand By Assistance   Assistive Device No Assistive Device   Wheelchair Activities   Propulsion Yes   Propulsion Type 1 Manual   Level 1 Level tile   Method 1 Right upper extremity;Left upper extremity   Level of Assistance 1 Supervision   Description/ Details 1 200'   Gait   Gait Yes   Weight Bearing Status WBAT   Gait 1   Surface 1 Level tile   Gait Assistive Device Rolling walker   Assistance 1 Stand by Assistance   Gait Distance 115' x 2   Gait Pattern swing-to gait   Gait Deviation(s) decreased nicko;increased time in double  stance;decreased velocity of limb motion;decreased step length;decreased stride length;decreased swing-to-stance ratio;decreased toe-to-floor clearance   Impairments Contributing to Gait Deviations impaired balance;decreased flexibility;pain;decreased ROM;decreased strength   Stairs   Stairs Yes   Stairs/Curb Step   Rails 1 Bilateral   Device 1 No device   Assistance 1 Stand by Assistance   Comment/# Steps 1 up and down 12 stairs   Supine   Supine-Exercises Lower extremity;Specific exercises   Supine-Exercise Type Ankle pumps;Glut sets;Short arc quads;Bridging with bolster;ABD/ADD;Heel slides   Supine-Exercise Comments B LE 2 x 15 reps   Activity Tolerance   Activity Tolerance Patient tolerated treatment well   After Treatment   Patient Position After Treatment Seated in wheelchair   Patient after treatment left call button in reach   Assessment   Prognosis Good   Problem List Decreased strength;Decreased range of motion;Decreased endurance;Impaired balance;Decreased mobility;Pain   Session Assessment progressing toward goals   Assessment Pt martha tx well, cont POC   Level of Motivation/Participation good   Barriers to Discharge Inaccessible home environment;Decreased caregiver support   Discharge Recommendations   Equipment Needed After Discharge 3-in-1 commode;shower chair;walker, rolling;wheelchair   Discharge Facility/Level Of Care Needs outpatient PT   Plan   Planned Therapy Intervention Continue with current plan   Therapy Frequency 2 times/day;Monday-Friday;Saturday or Sunday   Physical Therapy Follow-up   PT Follow-up? Yes   Treatment/Billable Minutes   Gait training 30   Therapeutic Exercise 15   Total Time 45       Micki Carranza, ANAI  1/12/2017

## 2017-01-12 NOTE — PROGRESS NOTES
Physical Therapy   Daily [facility]  Doug Garibay   MRN: [id number]      01/12/17 0830   Transfers   Bed/Chair/WC 5   Locomotion   Distance Walked 2   Distance Wheelchair 3   Walk 2   Wheelchair 5   Mode C   Stairs 2         Micki Carranza, ANAI  1/12/2017

## 2017-01-13 PROCEDURE — 25000003 PHARM REV CODE 250: Performed by: PHYSICAL MEDICINE & REHABILITATION

## 2017-01-13 PROCEDURE — 97535 SELF CARE MNGMENT TRAINING: CPT

## 2017-01-13 PROCEDURE — 25000003 PHARM REV CODE 250: Performed by: STUDENT IN AN ORGANIZED HEALTH CARE EDUCATION/TRAINING PROGRAM

## 2017-01-13 PROCEDURE — 97110 THERAPEUTIC EXERCISES: CPT

## 2017-01-13 PROCEDURE — 97150 GROUP THERAPEUTIC PROCEDURES: CPT

## 2017-01-13 PROCEDURE — 97112 NEUROMUSCULAR REEDUCATION: CPT

## 2017-01-13 PROCEDURE — 97116 GAIT TRAINING THERAPY: CPT

## 2017-01-13 PROCEDURE — 97530 THERAPEUTIC ACTIVITIES: CPT

## 2017-01-13 PROCEDURE — 99232 SBSQ HOSP IP/OBS MODERATE 35: CPT | Mod: ,,, | Performed by: PHYSICAL MEDICINE & REHABILITATION

## 2017-01-13 PROCEDURE — 12800000 HC REHAB SEMI-PRIVATE ROOM

## 2017-01-13 RX ORDER — METOPROLOL SUCCINATE 100 MG/1
100 TABLET, EXTENDED RELEASE ORAL NIGHTLY
Status: DISCONTINUED | OUTPATIENT
Start: 2017-01-13 | End: 2017-01-17 | Stop reason: HOSPADM

## 2017-01-13 RX ORDER — HYDRALAZINE HYDROCHLORIDE 50 MG/1
50 TABLET, FILM COATED ORAL EVERY 6 HOURS PRN
Status: DISCONTINUED | OUTPATIENT
Start: 2017-01-13 | End: 2017-01-17 | Stop reason: HOSPADM

## 2017-01-13 RX ORDER — METOPROLOL SUCCINATE 100 MG/1
200 TABLET, EXTENDED RELEASE ORAL NIGHTLY
Status: DISCONTINUED | OUTPATIENT
Start: 2017-01-13 | End: 2017-01-13

## 2017-01-13 RX ADMIN — METOPROLOL SUCCINATE 100 MG: 100 TABLET, EXTENDED RELEASE ORAL at 09:01

## 2017-01-13 RX ADMIN — NIFEDIPINE 60 MG: 30 TABLET, FILM COATED, EXTENDED RELEASE ORAL at 07:01

## 2017-01-13 RX ADMIN — SIMVASTATIN 20 MG: 20 TABLET, FILM COATED ORAL at 09:01

## 2017-01-13 RX ADMIN — FERROUS SULFATE TAB EC 325 MG (65 MG FE EQUIVALENT) 325 MG: 325 (65 FE) TABLET DELAYED RESPONSE at 07:01

## 2017-01-13 RX ADMIN — ASPIRIN 325 MG: 325 TABLET, DELAYED RELEASE ORAL at 07:01

## 2017-01-13 RX ADMIN — LEVOTHYROXINE SODIUM 150 MCG: 75 TABLET ORAL at 07:01

## 2017-01-13 RX ADMIN — STANDARDIZED SENNA CONCENTRATE AND DOCUSATE SODIUM 1 TABLET: 8.6; 5 TABLET, FILM COATED ORAL at 07:01

## 2017-01-13 RX ADMIN — OXYCODONE HYDROCHLORIDE 10 MG: 10 TABLET, FILM COATED, EXTENDED RELEASE ORAL at 07:01

## 2017-01-13 RX ADMIN — OXYCODONE HYDROCHLORIDE 15 MG: 5 TABLET ORAL at 09:01

## 2017-01-13 RX ADMIN — FAMOTIDINE 20 MG: 20 TABLET, FILM COATED ORAL at 07:01

## 2017-01-13 RX ADMIN — PREGABALIN 75 MG: 75 CAPSULE ORAL at 09:01

## 2017-01-13 RX ADMIN — POLYETHYLENE GLYCOL 3350 17 G: 17 POWDER, FOR SOLUTION ORAL at 07:01

## 2017-01-13 RX ADMIN — VITAMIN D, TAB 1000IU (100/BT) 1000 UNITS: 25 TAB at 07:01

## 2017-01-13 NOTE — PROGRESS NOTES
"Occupational Therapy  AM Treatment  Doug Garibay   MRN: [id number]   Room/Bed: E255/E255 B     01/13/17 0900   OT Time Calculation   OT Start Time 0900   OT Stop Time 0945   OT Total Time (min) 45 min   General   OT Date of Treatment 01/13/17   Family/Caregiver Present No   Patient Found (position) Supine in bed   Precautions   General Precautions fall   Orthopedic Yes   RLE Weight Bearing WBAT   LLE Weight Bearing WBAT   Subjective   Patient states "I feel tired after therapy, and I'd like to take a nap."   Pain/Comfort   Pain Rating 3/10   Location - Side Bilateral   Location knee   Pain Addressed Pre-medicate for activity   Pain Comment in standing   Bed Mobility   Bed Mobility yes   Supine to Sit Modified Independent   Supine to Sit Comments to EOB   Sit to Supine Supervision   Sit to Supine Comments from EOB to supine   Transfers   Transfer yes   Sit to Stand   Sit <> Stand Assistance Stand By Assistance   Sit <> Stand Assistive Device Rolling Walker   Trials/Comments from w/c   Stand to Sit   Assistance Stand By Assistance   Assistive Device Rolling Walker   Trials/Comments to w/c   Bed to Chair   Bed <> Chair Technique Stand Pivot   Bed <> Chair Transfer Assistance Stand By Assistance   Bed <> Chair Assistive Device Rolling Walker   Chair to Bed   Technique Stand Pivot   Assistance Stand By Assistance   Assistive Device Rolling Walker   Trials/Comments from w/c to supine in bed   Grooming   Grooming Level of Assistance Independent   Grooming Where Assessed Edge of bed   Bathing   Bathing Level of Assistance Stand by assistance   Bathing Where Assessed Edge of bed   UE Dressing   UE Dressing Level of Assistance Independent   UE Dressing Comments edge of bed   LE Dressing   LE Dressing  Level of Assistance Stand by assistance   Sock Level of Assistance Stand by assistance   LE Dressing Where Assessed Edge of bed   LE Dressing Comments steadying assistance for balance   Balance   Balance Yes   Dynamic " Standing Balance   Dynamic Standing-Balance Support No upper extremity supported   Dynamic Standing-Balance Reaching for objects;Reaching across midline   Dynamic Standing-Comments SBA during functional activities.   Additional Activities   Additional Activities Other (Comment)   Additional Activities Comments SBA for functional mobility therapeutic activity as pt. carried cards around to opposite side of table and shifted weight to place cards onto board in standing with 2 seated rest breaks x28 minutes. Pt completed 26 x 31ft.     Activity Tolerance   Activity Tolerance Patient tolerated treatment well   After Treatment   Patient Position After Treatment Supine in bed   Patient after treatment left call button in reach   Assessment   Prognosis Good   Problem List Decreased Self Care skills;Decreased upper extremity strength;Decreased endurance;Decreased IADLs;Decreased functional mobility   Assessment Pt tolerated tx session well today. Pt demonstrated improved standing endurance during tx session as he required 2 seated rest breaks to complete ther ax. Pt requires verbal cueing for safety during LB dressing for using the RW when standing at EOB. However, pt is continuing  to progress with functional t/f and dynamic standing balance. Pt would benefit from continued skilled OT services to improve (I) with t/fs, ADLs, IADLs, and functional mobilty.    Level of Motivation/Participation good   Discharge Recommendations   Equipment Needed After Discharge 3-in-1 commode;walker, rolling;wheelchair;shower chair   Discharge Facility/Level Of Care Needs outpatient PT   Plan   Plan Self care retraining;UE thereex;Functional transfer training;Equipment Training;Safety training;Functional endurance training;Patient education;Postural control;AROM;Functional Standing Activities   Therapy Frequency 2 times/day   Occupational Therapy Follow-up   OT Follow-up? Yes   Treatment/Billable Minutes   Self Care/Home Management 17    Neuromuscular Re-ed 28   Total Time 45       Kayla ANDI Dugan  1/13/2017    LEGEND:   CGA: Contact Guard Assist   EOB: Edgeof Bed   HHA: Hand Held Assist   HOB: Head of Bed   (I): Independent-patient performs task in a timely manner   Max (A): Maximal Assist-patient performs 25-49% of task   Min (A): Minimal Assist- patient performs 75% or more of task   Mod (A): Moderate Assist- patient performs 50-74% of task   NA: Not applicable   NT: Not tested   OOB: Out of Bed   PTA: Prior to admit   QC: Quad Cane   RW: Rolling Walker   (S): Supervision- patient requires cues, coaxing, prompting   SBA: Stand By Assist   SC: Straight Cane   SW: Standard Walker   TBA: To be assessed   Total (A): Total Assist- patient performs less than 25% of task   WC: Wheelchair   WFL: Within Functional Limits   WNL: Within Normal Limits

## 2017-01-13 NOTE — PROGRESS NOTES
Physical Therapy   Treatment    Doug Garibay   MRN: [id number]   PTA visit #: 3   01/13/17 1115   PT Time Calculation   PT Start Time 1116   PT Stop Time 1201   PT Total Time (min) 45 min   Treatment   Treatment Type Treatment   PT/PTA PTA   PTA Visit Number 3   General   PT Received On 01/13/17   Family/Caregiver Present No   Patient Found (position) Seated at edge of bed   Precautions   General Precautions fall   Orthopedic Yes   RLE Weight Bearing WBAT   LLE Weight Bearing WBAT   Subjective   Patient states Pt agreeable to tx w/o complaints   Pain/Comfort   Pain Rating no pain   Bed Mobility   Bed Mobility yes   Supine to Sit Modified Independent   Supine to Sit Comments mat   Sit to Supine Modified Independent   Transfers   Transfer yes   Sit to Stand   Sit <> Stand Assistance Stand By Assistance   Sit <> Stand Assistive Device Rolling Walker   Stand to Sit   Assistance Stand By Assistance   Assistive Device Rolling Walker   Bed to Chair   Bed <> Chair Technique Stand Pivot   Bed <> Chair Assistance Stand By Assistance   Bed <> Chair Assistive Device No Assistive Device   Chair to Mat   Chair<> Mat Technique Stand Pivot   Chair<>Mat Assistance Stand By Assistance   Chair <> Mat Assistive Device No Assistive Device   Mat to Chair   Technique Stand Pivot   Assistance Stand By Assistance   Assistive Device No Assistive Device   Wheelchair Activities   Propulsion Yes   Propulsion Type 1 Manual   Level 1 Level tile   Method 1 Right upper extremity;Left upper extremity   Level of Assistance 1 Supervision   Description/ Details 1 200'   Gait   Gait Yes   Weight Bearing Status WBAT   Gait 1   Surface 1 Level tile   Gait Assistive Device Rolling walker   Assistance 1 Stand by Assistance   Gait Distance 217'   Gait Pattern swing-through gait   Gait Deviation(s) decreased nicko;decreased step length;decreased stride length   Impairments Contributing to Gait Deviations decreased flexibility;decreased strength   Stairs    Stairs Yes   Stairs/Curb Step   Rails 1 Bilateral   Device 1 No device   Assistance 1 Stand by Assistance   Comment/# Steps 1 12 stairs   Supine   Supine-Exercises Lower extremity;Specific exercises   Supine-Exercise Type Ankle pumps;Glut sets;Short arc quads;ABD/ADD;Heel slides   Supine-Exercise Comments B LE 2 x 20 reps   Seated   Seated-Exercises Lower extremity;Specific exercises   Seated-Exercise Type Knee flex   Seated-Exercise Comments B LE 2 x 20 reps       ROM: Left Knee   (Degrees) Extension 0   (Degrees) Flexion 115       ROM: Right Knee   (Degrees) Extension -1   (Degrees) Flexion 110   Activity Tolerance   Activity Tolerance Patient tolerated treatment well   After Treatment   Patient Position After Treatment Seated in wheelchair   Assessment   Prognosis Good   Problem List Decreased strength;Decreased range of motion;Decreased mobility   Session Assessment progressing toward goals   Assessment Pt martha tx well with increased amb distance and increased ROM B knees.  Cont POC   Level of Motivation/Participation good   Barriers to Discharge Inaccessible home environment;Decreased caregiver support   Discharge Recommendations   Equipment Needed After Discharge 3-in-1 commode;shower chair;walker, rolling;wheelchair   Discharge Facility/Level Of Care Needs outpatient PT   Plan   Planned Therapy Intervention Continue with current plan   Therapy Frequency 2 times/day;Monday-Friday;Saturday or Sunday   Physical Therapy Follow-up   PT Follow-up? Yes   Treatment/Billable Minutes   Gait training 15   Therapeutic Activity 15   Therapeutic Exercise 15   Total Time 45       Micki Carranza, ANAI  1/13/2017

## 2017-01-13 NOTE — PROGRESS NOTES
Occupational Therapy  [facility]    Doug Garibay  MRN: [id number]  Room/Bed: E255/E255 B       01/13/17 0900   Transfers   Bed/Chair/WC 5   Toilet 5   Tub 5   Shower 5   Self Care   Eating 7   Grooming 7   Bathing 5   Dressing-Upper 7   Dressing-Lower 5   Toileting 4       MONA Meehan  1/13/2017

## 2017-01-13 NOTE — PROGRESS NOTES
Pt discussed during treatment team staffing.  Expected discharge date is 1/17/17.  Pt informed and agrees with discharge date.   Pt expressed confidence and readiness to go home.

## 2017-01-13 NOTE — PLAN OF CARE
Problem: Patient Care Overview  Goal: Plan of Care Review  Outcome: Ongoing (interventions implemented as appropriate)    01/13/17 0641   Coping/Psychosocial   Plan Of Care Reviewed With patient   Pt awake and alert during shift. NAD noted. Slept well during night. No c/o pain or discomfort. Remains free from falls. Safety measures maintained. Pt refused to wear polar ice during night. Call light used for assistance. Bed in lowest position. Call light in reach.    Problem: Fall Risk (Adult)  Goal: Absence of Falls  Patient will demonstrate the desired outcomes by discharge/transition of care.   Outcome: Ongoing (interventions implemented as appropriate)    01/13/17 0641   Fall Risk (Adult)   Absence of Falls making progress toward outcome         Problem: Pain, Acute (Adult)  Goal: Acceptable Pain Control/Comfort Level  Patient will demonstrate the desired outcomes by discharge/transition of care.   Outcome: Ongoing (interventions implemented as appropriate)    01/13/17 0641   Pain, Acute (Adult)   Acceptable Pain Control/Comfort Level making progress toward outcome

## 2017-01-13 NOTE — PROGRESS NOTES
"Physical Therapy  Volleyball Group and PM Treatment    Doug Garibay   MRN: [id number]    01/13/17 1400   PT Time Calculation   PT Start Time 1400   PT Stop Time 1530   PT Total Time (min) 90 min   Treatment   Treatment Type Treatment   PT/PTA PT   General   PT Received On 01/13/17   Missed Time Reason Not applicable   Family/Caregiver Present No   Patient Found (position) Seated in wheelchair   Precautions   General Precautions fall   Orthopedic Yes   RLE Weight Bearing WBAT   LLE Weight Bearing WBAT   Subjective   Patient states "I find that I am moving around a lot better now."   Pain/Comfort   Pain Rating no pain   Bed Mobility   Bed Mobility yes   Sit to Supine Modified Independent   Transfers   Transfer yes   Sit to Stand   Sit <> Stand Assistance Stand By Assistance   Sit <> Stand Assistive Device Rolling Walker   Trials/Comments multiple trials   Stand to Sit   Assistance Stand By Assistance   Assistive Device Rolling Walker   Trials/Comments multiple trials   Chair to Bed   Technique Stand Pivot   Assistance Stand By Assistance   Assistive Device No Assistive Device   Trials/Comments x 1    Chair to Mat   Chair<> Mat Technique Stand Pivot   Chair<>Mat Assistance Stand By Assistance   Chair <> Mat Assistive Device Rolling Walker   Trials/Comments x 1    Mat to Chair   Technique Stand Pivot   Assistance Stand By Assistance   Assistive Device Rolling Walker   Trials/Comments x 1    Gait   Gait Yes   Weight Bearing Status WBAT   Gait 1   Surface 1 Level tile   Gait Assistive Device Rolling walker   Assistance 1 Stand by Assistance   Gait Distance 492ft, 340ft with SBA for safety, improved swing and heel strike noted   Gait Pattern swing-through gait   Gait Deviation(s) decreased nicko;increased time in double stance;decreased velocity of limb motion;decreased step length;decreased stride length   Impairments Contributing to Gait Deviations impaired balance;decreased strength;impaired postural " control;decreased flexibility   Seated   Seated-Exercises Lower extremity   Seated-Exercise Type Knee flex;Long arc quads;Hip flexion   Seated-Exercise Comments 3 x 15 reos   Other Comments   Comments PT set patient up on polar ice once in bed   After Treatment   Patient Position After Treatment Supine in bed   Patient after treatment left call button in reach   Discharge Recommendations   Equipment Needed After Discharge 3-in-1 commode;wheelchair;walker, rolling;shower chair   Discharge Facility/Level Of Care Needs outpatient PT   Plan   Planned Therapy Intervention Continue with current plan   Therapy Frequency 2 times/day;Monday-Friday;Saturday or Sunday   Physical Therapy Follow-up   PT Follow-up? Yes   PT - Next Visit Date 01/14/17   Treatment/Billable Minutes   Gait training 25   Therapeutic Activity 10   Therapeutic Exercise 10   Therapeutic Activity Group 45   Total Time 90     Patient participated in 45 minute volleyball group activity.  The group activity challenged dynamic standing/sitting skills, bilateral upper extremity strengthening, cardiovascular and respiratory capacity, hand -eye coordination, visual scanning and social affect/mood.  The patient performed this activity at w/c level with SPV.  The patient required 0 rest breaks during this activity.  Patient performed activity using bilateral upper extremities to volley the ball, lateral arm movement noted throughout the activity.  Endurance improved, no pain complaints voiced are observed , social affect good as patient was observed throughout this activity ie., appropriately engaged in social exchange with peers and staff.  Barbara Loza DPT  1/13/2017

## 2017-01-13 NOTE — PROGRESS NOTES
Physical Therapy   Daily FIM Scores    Doug Garibay   MRN: [id number]      01/13/17 1116   Transfers   Bed/Chair/WC 5   Locomotion   Distance Walked 3   Distance Wheelchair 3   Walk 5   Wheelchair 5   Mode C   Stairs 5         Micki Carranza, ANAI  1/13/2017

## 2017-01-13 NOTE — PROGRESS NOTES
Pt without new c/o.    Interval hx:  I have reviewed the pt's HPI and PFSH and it is unchanged from admission.     VSS - Temp:  [98.1 °F (36.7 °C)-98.4 °F (36.9 °C)] 98.4 °F (36.9 °C)  Pulse:  [82-86] 82  Resp:  [16-22] 16  SpO2:  [96 %] 96 %  BP: (151-154)/(82-84) 154/84    Review of Systems   Eyes: Negative for blurred vision.   Respiratory: Negative for cough.   Cardiovascular: Negative for chest pain.   Gastrointestinal: Negative for nausea and vomiting.   Genitourinary: Negative for dysuria.   Musculoskeletal: Positive for joint pain. Negative for myalgias.   Skin: Negative.   Neurological: Positive for weakness. Negative for dizziness, sensory change and headaches.   Psychiatric/Behavioral: Negative for depression. The patient has insomnia.      Physical Exam   Constitutional: He is oriented to person, place, and time. He appears well-nourished.   Eyes: EOM are normal. Pupils are equal, round, and reactive to light.   Neck: Normal range of motion. Neck supple.   Cardiovascular: Normal rate.   Pulmonary/Chest: Effort normal.   Abdominal: Soft.   Musculoskeletal:   BUEs AROM WNL  BLEs AROM diminished at the knees and with HF   Neurological: He is oriented to person, place, and time.   BUEs 5/5  BLEs 4/5 HF, 4+/5 HE, 4/5 KF/KE, 5/5 DF/PF   Skin: No rash noted.   Psychiatric: He has a normal mood and affect.     Assessment:    1. S/P bilateral TKAs -- no signs/infxn.  Sit-->stand SBA (improved), T/Fs CGA (improved), gait 115' SBA (improved).   L  deg, ext -2 deg.  R  deg, ext -7 deg.  UBD SBA, LBD SBA on 1/10.  Pain improved with added Oxycontin 10mg.  2. HTN -- controlled.  Changed hydralazine 75mg TID to Toprol XL 100mg to simplify his regimen some.  May need to increase to 200mg.    3. CKD -- stable.  CRT 1.8 on 1/12, slightly prerenal.  Will encourage fluids, ck again 1/14.        Future Appointments  Date Time Provider Department Center   1/18/2017 1:30 PM Alice Tony NP [facility]

## 2017-01-14 LAB
ANION GAP SERPL CALC-SCNC: 8 MMOL/L
BUN SERPL-MCNC: 40 MG/DL
CALCIUM SERPL-MCNC: 10.9 MG/DL
CHLORIDE SERPL-SCNC: 99 MMOL/L
CO2 SERPL-SCNC: 27 MMOL/L
CREAT SERPL-MCNC: 1.8 MG/DL
EST. GFR  (AFRICAN AMERICAN): 46.9 ML/MIN/1.73 M^2
EST. GFR  (NON AFRICAN AMERICAN): 40.6 ML/MIN/1.73 M^2
GLUCOSE SERPL-MCNC: 88 MG/DL
POTASSIUM SERPL-SCNC: 5.1 MMOL/L
SODIUM SERPL-SCNC: 134 MMOL/L

## 2017-01-14 PROCEDURE — 25000003 PHARM REV CODE 250: Performed by: STUDENT IN AN ORGANIZED HEALTH CARE EDUCATION/TRAINING PROGRAM

## 2017-01-14 PROCEDURE — 12800000 HC REHAB SEMI-PRIVATE ROOM

## 2017-01-14 PROCEDURE — 99232 SBSQ HOSP IP/OBS MODERATE 35: CPT | Mod: ,,, | Performed by: PHYSICAL MEDICINE & REHABILITATION

## 2017-01-14 PROCEDURE — 80048 BASIC METABOLIC PNL TOTAL CA: CPT

## 2017-01-14 PROCEDURE — 25000003 PHARM REV CODE 250: Performed by: PHYSICAL MEDICINE & REHABILITATION

## 2017-01-14 RX ADMIN — OXYCODONE HYDROCHLORIDE 15 MG: 5 TABLET ORAL at 07:01

## 2017-01-14 RX ADMIN — STANDARDIZED SENNA CONCENTRATE AND DOCUSATE SODIUM 1 TABLET: 8.6; 5 TABLET, FILM COATED ORAL at 08:01

## 2017-01-14 RX ADMIN — ASPIRIN 325 MG: 325 TABLET, DELAYED RELEASE ORAL at 08:01

## 2017-01-14 RX ADMIN — SIMVASTATIN 20 MG: 20 TABLET, FILM COATED ORAL at 09:01

## 2017-01-14 RX ADMIN — VITAMIN D, TAB 1000IU (100/BT) 1000 UNITS: 25 TAB at 07:01

## 2017-01-14 RX ADMIN — FAMOTIDINE 20 MG: 20 TABLET, FILM COATED ORAL at 08:01

## 2017-01-14 RX ADMIN — PREGABALIN 75 MG: 75 CAPSULE ORAL at 09:01

## 2017-01-14 RX ADMIN — METOPROLOL SUCCINATE 100 MG: 100 TABLET, EXTENDED RELEASE ORAL at 09:01

## 2017-01-14 RX ADMIN — NIFEDIPINE 60 MG: 30 TABLET, FILM COATED, EXTENDED RELEASE ORAL at 07:01

## 2017-01-14 RX ADMIN — FERROUS SULFATE TAB EC 325 MG (65 MG FE EQUIVALENT) 325 MG: 325 (65 FE) TABLET DELAYED RESPONSE at 07:01

## 2017-01-14 RX ADMIN — STANDARDIZED SENNA CONCENTRATE AND DOCUSATE SODIUM 1 TABLET: 8.6; 5 TABLET, FILM COATED ORAL at 07:01

## 2017-01-14 RX ADMIN — FAMOTIDINE 20 MG: 20 TABLET, FILM COATED ORAL at 07:01

## 2017-01-14 RX ADMIN — ZOLPIDEM TARTRATE 5 MG: 5 TABLET ORAL at 09:01

## 2017-01-14 RX ADMIN — LEVOTHYROXINE SODIUM 150 MCG: 75 TABLET ORAL at 07:01

## 2017-01-14 RX ADMIN — POLYETHYLENE GLYCOL 3350 17 G: 17 POWDER, FOR SOLUTION ORAL at 07:01

## 2017-01-14 RX ADMIN — ASPIRIN 325 MG: 325 TABLET, DELAYED RELEASE ORAL at 07:01

## 2017-01-14 NOTE — PROGRESS NOTES
Subjective:       Patient ID: Doug Garibay is a 56 y.o. male.    Chief Complaint: No chief complaint on file.    HPI     Knee pain well controlled. Max 3-4/10,min 1/10.    Review of Systems    No N/V, CP/SOB, dizziness/headaches.  No constipation.    Objective:      Physical Exam   Constitutional: He appears well-developed and well-nourished.   HENT:   Head: Normocephalic and atraumatic.   Eyes: EOM are normal.   Neck: Normal range of motion.   Cardiovascular: Normal rate and regular rhythm.    Pulmonary/Chest: Effort normal and breath sounds normal.   Musculoskeletal:   BUE:  ROM:full.  Strength:    RUE: 5/5 at shoulder abduction, 5 elbow flexion, 5 elbow extension, 5 hand .   LUE: 5/5 at shoulder abduction, 5 elbow flexion, 5 elbow extension, 5 hand .    BLE:  TKA incision: clean/dry. Mild warmth & swelling.  Strength:    RLE: 4/5 at hip flexion, 4 knee extension, 5 ankle DF, 5 PF.   LLE: 4+/5 at hip flexion, 4+ knee extension, 5 ankle DF, 5 PF.  Sensation to pinprick:     RLE: intact.      LLE: intact.      Neurological: He is alert.   Skin: Skin is warm.   Psychiatric: He has a normal mood and affect. His behavior is normal.   Vitals reviewed.         Labs:  Today's BUN/Creat 40/1.8 (vs 43/1.8 on 1/12/17).    Assessment/Plan:      1. S/P bilateral TKAs -- Pain well controlled.  To continue current rehab efforts.  2. HTN -- controlled.   3. CKD -- stable. To encourage PO fluids and monitor.

## 2017-01-15 PROCEDURE — 99232 SBSQ HOSP IP/OBS MODERATE 35: CPT | Mod: ,,, | Performed by: PHYSICAL MEDICINE & REHABILITATION

## 2017-01-15 PROCEDURE — 25000003 PHARM REV CODE 250: Performed by: STUDENT IN AN ORGANIZED HEALTH CARE EDUCATION/TRAINING PROGRAM

## 2017-01-15 PROCEDURE — 12800000 HC REHAB SEMI-PRIVATE ROOM

## 2017-01-15 PROCEDURE — 25000003 PHARM REV CODE 250: Performed by: PHYSICAL MEDICINE & REHABILITATION

## 2017-01-15 RX ADMIN — METOPROLOL SUCCINATE 100 MG: 100 TABLET, EXTENDED RELEASE ORAL at 08:01

## 2017-01-15 RX ADMIN — POLYETHYLENE GLYCOL 3350 17 G: 17 POWDER, FOR SOLUTION ORAL at 09:01

## 2017-01-15 RX ADMIN — STANDARDIZED SENNA CONCENTRATE AND DOCUSATE SODIUM 1 TABLET: 8.6; 5 TABLET, FILM COATED ORAL at 07:01

## 2017-01-15 RX ADMIN — OXYCODONE HYDROCHLORIDE 10 MG: 5 TABLET ORAL at 02:01

## 2017-01-15 RX ADMIN — FAMOTIDINE 20 MG: 20 TABLET, FILM COATED ORAL at 07:01

## 2017-01-15 RX ADMIN — OXYCODONE HYDROCHLORIDE 15 MG: 5 TABLET ORAL at 07:01

## 2017-01-15 RX ADMIN — LEVOTHYROXINE SODIUM 150 MCG: 75 TABLET ORAL at 09:01

## 2017-01-15 RX ADMIN — FAMOTIDINE 20 MG: 20 TABLET, FILM COATED ORAL at 09:01

## 2017-01-15 RX ADMIN — VITAMIN D, TAB 1000IU (100/BT) 1000 UNITS: 25 TAB at 09:01

## 2017-01-15 RX ADMIN — STANDARDIZED SENNA CONCENTRATE AND DOCUSATE SODIUM 1 TABLET: 8.6; 5 TABLET, FILM COATED ORAL at 09:01

## 2017-01-15 RX ADMIN — ASPIRIN 325 MG: 325 TABLET, DELAYED RELEASE ORAL at 09:01

## 2017-01-15 RX ADMIN — NIFEDIPINE 60 MG: 30 TABLET, FILM COATED, EXTENDED RELEASE ORAL at 09:01

## 2017-01-15 RX ADMIN — ASPIRIN 325 MG: 325 TABLET, DELAYED RELEASE ORAL at 07:01

## 2017-01-15 RX ADMIN — FERROUS SULFATE TAB EC 325 MG (65 MG FE EQUIVALENT) 325 MG: 325 (65 FE) TABLET DELAYED RESPONSE at 09:01

## 2017-01-15 RX ADMIN — PREGABALIN 75 MG: 75 CAPSULE ORAL at 08:01

## 2017-01-15 RX ADMIN — SIMVASTATIN 20 MG: 20 TABLET, FILM COATED ORAL at 08:01

## 2017-01-15 NOTE — PLAN OF CARE
Problem: Fall Risk (Adult)  Goal: Absence of Falls  Patient will demonstrate the desired outcomes by discharge/transition of care.   Outcome: Ongoing (interventions implemented as appropriate)  Rested quietly throughout night. Respiration even and unlabored. Side rail upx3. Call light in reach. No fall or injury noted this shift

## 2017-01-15 NOTE — PROGRESS NOTES
Progress Note  Physical Medicine & Rehab    Admit Date: 1/6/2017   LOS: 9 days     SUBJECTIVE:     Knee pain well controlled.usually 0/10.  Very motivated . Doing exercises in his room.    Review of Systems:  No F/C.  No N/V.  No CP/SOB.  No dizziness/headaches.      OBJECTIVE:     Vital Signs (Most Recent)  Temp: 98.8 °F (37.1 °C) (01/15/17 0916)  Pulse: 80 (01/15/17 0916)  Resp: 18 (01/15/17 0916)  BP: 133/74 (01/15/17 0916)  SpO2: 98 % (01/15/17 0916)    Vital Signs Range (Last 24H):  Temp:  [98.4 °F (36.9 °C)-98.8 °F (37.1 °C)]   Pulse:  [74-80]   Resp:  [18]   BP: (133-142)/(74-82)   SpO2:  [98 %]     I & O (Last 24H):  Intake/Output Summary (Last 24 hours) at 01/15/17 1108  Last data filed at 01/15/17 0500   Gross per 24 hour   Intake              960 ml   Output             2300 ml   Net            -1340 ml     Physical Exam:    Constitutional: He appears well-developed and well-nourished.   HENT:   Head: Normocephalic and atraumatic.   Eyes: EOM are normal.   Neck: Normal range of motion.   Cardiovascular: Normal rate and regular rhythm.   Pulmonary/Chest: Effort normal and breath sounds normal.   Musculoskeletal:   BUE:  ROM:full.  Strength:   RUE: 5/5 at shoulder abduction, 5 elbow flexion, 5 elbow extension, 5 hand .  LUE: 5/5 at shoulder abduction, 5 elbow flexion, 5 elbow extension, 5 hand .    BLE:  TKA incision: clean/dry. Mild warmth & swelling.  Strength:   RLE: 4+/5 at hip flexion, 4 knee extension, 5 ankle DF, 5 PF.  LLE: 4+/5 at hip flexion, 4 knee extension, 5 ankle DF, 5 PF.  Sensation to pinprick:  RLE: intact.   LLE: intact.     Neurological: He is alert.   Skin: Skin is warm.   Psychiatric: He has a normal mood and affect. His behavior is normal.   Vitals reviewed.         Labs:  None today.     Assessment/Plan:       1. S/P bilateral TKAs -- Pain well controlled. To continue current rehab efforts.  2. HTN -- controlled.   3. CKD -- stable. To encourage PO fluids and monitor.

## 2017-01-16 PROCEDURE — 97116 GAIT TRAINING THERAPY: CPT

## 2017-01-16 PROCEDURE — 97150 GROUP THERAPEUTIC PROCEDURES: CPT

## 2017-01-16 PROCEDURE — 12800000 HC REHAB SEMI-PRIVATE ROOM

## 2017-01-16 PROCEDURE — 97530 THERAPEUTIC ACTIVITIES: CPT

## 2017-01-16 PROCEDURE — 97535 SELF CARE MNGMENT TRAINING: CPT

## 2017-01-16 PROCEDURE — 25000003 PHARM REV CODE 250: Performed by: STUDENT IN AN ORGANIZED HEALTH CARE EDUCATION/TRAINING PROGRAM

## 2017-01-16 PROCEDURE — 25000003 PHARM REV CODE 250: Performed by: PHYSICAL MEDICINE & REHABILITATION

## 2017-01-16 PROCEDURE — 97110 THERAPEUTIC EXERCISES: CPT

## 2017-01-16 PROCEDURE — 97112 NEUROMUSCULAR REEDUCATION: CPT

## 2017-01-16 PROCEDURE — 99233 SBSQ HOSP IP/OBS HIGH 50: CPT | Mod: ,,, | Performed by: PHYSICAL MEDICINE & REHABILITATION

## 2017-01-16 RX ADMIN — NIFEDIPINE 60 MG: 30 TABLET, FILM COATED, EXTENDED RELEASE ORAL at 10:01

## 2017-01-16 RX ADMIN — LEVOTHYROXINE SODIUM 150 MCG: 75 TABLET ORAL at 08:01

## 2017-01-16 RX ADMIN — OXYCODONE HYDROCHLORIDE 5 MG: 5 TABLET ORAL at 03:01

## 2017-01-16 RX ADMIN — METOPROLOL SUCCINATE 100 MG: 100 TABLET, EXTENDED RELEASE ORAL at 10:01

## 2017-01-16 RX ADMIN — OXYCODONE HYDROCHLORIDE 5 MG: 5 TABLET ORAL at 09:01

## 2017-01-16 RX ADMIN — OXYCODONE HYDROCHLORIDE 10 MG: 5 TABLET ORAL at 08:01

## 2017-01-16 RX ADMIN — PREGABALIN 75 MG: 75 CAPSULE ORAL at 09:01

## 2017-01-16 RX ADMIN — STANDARDIZED SENNA CONCENTRATE AND DOCUSATE SODIUM 1 TABLET: 8.6; 5 TABLET, FILM COATED ORAL at 09:01

## 2017-01-16 RX ADMIN — ASPIRIN 325 MG: 325 TABLET, DELAYED RELEASE ORAL at 10:01

## 2017-01-16 RX ADMIN — STANDARDIZED SENNA CONCENTRATE AND DOCUSATE SODIUM 1 TABLET: 8.6; 5 TABLET, FILM COATED ORAL at 08:01

## 2017-01-16 RX ADMIN — VITAMIN D, TAB 1000IU (100/BT) 1000 UNITS: 25 TAB at 08:01

## 2017-01-16 RX ADMIN — FAMOTIDINE 20 MG: 20 TABLET, FILM COATED ORAL at 09:01

## 2017-01-16 RX ADMIN — FAMOTIDINE 20 MG: 20 TABLET, FILM COATED ORAL at 08:01

## 2017-01-16 RX ADMIN — POLYETHYLENE GLYCOL 3350 17 G: 17 POWDER, FOR SOLUTION ORAL at 08:01

## 2017-01-16 RX ADMIN — SIMVASTATIN 20 MG: 20 TABLET, FILM COATED ORAL at 10:01

## 2017-01-16 RX ADMIN — FERROUS SULFATE TAB EC 325 MG (65 MG FE EQUIVALENT) 325 MG: 325 (65 FE) TABLET DELAYED RESPONSE at 08:01

## 2017-01-16 RX ADMIN — ASPIRIN 325 MG: 325 TABLET, DELAYED RELEASE ORAL at 08:01

## 2017-01-16 NOTE — TREATMENT PLAN
"  Rehab Services' DME recommendations        Doug Garibay   MRN: [id number]          [] NO DME NEEDED ________________________________________________________________________    [x]Walker:(can only select one of these)  [x]Adult (5'4"-6'6") []Sheng (4'4"-5'7")                           [] Wide/ Heavy Duty  Sundar               []  Rollator                                                     [] knee walker       Accessories/Other:____________________________________     Wheels:(must complete) [x] Yes  [] No     []Crutches:  []Axillary []Loft strand    []Cane:              []Straight []Narrow based quad   []Wide based quad         [] Other:____________________________________________    [] Transfer Devices:   []João Lift    []Slide Board ( [] with cut out  []26  []29)    ______________________________________________________________________    []Wheelchair: (please check)    Number of hours up in wheelchair per     day:_____________     Style:  [] Standard [] Pediatric  [] Light weight  []Reclining     []Amputee [] Sundar [] POV []Custom     Custom Vendor:________________________________________                                                      Seat Width: []18 (standard adult)    []16" (small adult)   []14(child)      [] 20  [] 22 [] 24         Seat Depth:   []16    []18  []20      Back Height:    []Standard      []Sundar          []Other     Leg Support: []Standard []Heel loops []Elevating leg rest    []Articulating              []Swing Away     Arm Height:  []Detachable []Full   [] Desk  []Swing Away [] Adjustable Height          Lap Belt: []Velcro []Buckle                               Accessories: [] Front brakes          [] Brake Extensions  []Anti-tippers                          []Safety Belt    Other:_______________________________________________     Cushion: []Basic []Foam []Gel  []Roho []Jose I      Justification for Cushion(Must " complete):________________________________    ______________________________________________________________________        For wheelchair order: (please choose all that apply)  - Caregiver is capable and willing to operate wheelchair safely. []  - Patients upper body strength is sufficient for propulsion. []   - The patient has a cast, brace, or musculoskeletal condition which prevents 90 degree flexion of the knee. []  - The patient has significant edema of the lower extremities that requires elevating leg rests.  []  - A reclining back is ordered. []  - The patient requires the use of a wheel chair for ADLs within the home. []  - Patient mobility limitations cannot be sufficiently resolved by the use of other ambulatory therapies. []         __    [x]3 in 1 commode: [x]Standard     []Wide-Heavy Duty           []Drop arm                                      []Heavy Duty Drop Arm                              []Tub bench:  []Padded      [](Unpadded=standard)     []Commode Opening                                          [] Heavy Duty    []Shower Chair: []With back   []Without back      []Hospital Bed: []Semi Electric    []Manual    []Electric   []Heavy Duty  []Extra Heavy Duty(>600#)  Patient requires a bed height different than a fixed height hospital bed to permit transfers to chair, wheel chair or standing. []Yes         []N/A     []Accessories: [] Gel Overlay Mattress     []Low Air Mattress   []Alternating Pressure Pad                              []Trapeze    [] Hip Kit:  []Yes     []N/A         []Other:________________________________________________________________    ________________________________________________________________________    [x]Home Health:  [x]OT [x]PT []SLP   [] MSW   [] Aide    []SN        []Outpatient:  []OT []PT []SLP [] MSW   [] CM      [] Internal Referral      [] External Referral  ________________________________________________________________________    Linda Jenna, PT  1/16/2017

## 2017-01-16 NOTE — PROGRESS NOTES
Physical Therapy   Daily Physical Therapy FIM Scores    Doug Garibay   MRN: [id number]        01/16/17 1200   Transfers   Bed/Chair/WC 5   Toilet 5   Tub 5   Locomotion   Distance Walked 3   Distance Wheelchair 3   Walk 5   Wheelchair 6   Mode C   Stairs 5     Linda West, PT  1/16/2017

## 2017-01-16 NOTE — PROGRESS NOTES
"Physical Therapy   Treatment Session/Discharge Summary    Doug Garibay   MRN: [id number]      01/16/17 1001   PT Time Calculation   PT Start Time 1001   PT Stop Time 1101   PT Total Time (min) 60 min   Treatment   Treatment Type Treatment  (FIM Assessment/Discharge Summary)   PT/PTA PT   General   PT Received On 01/16/17   Family/Caregiver Present No   Patient Found (position) Seated in wheelchair   Pt found with (in gym from OT)   Precautions   General Precautions fall   Orthopedic Yes   RLE Weight Bearing WBAT   LLE Weight Bearing WBAT   Subjective   Patient states "I am ok, I am ready to go home tomorrow"   Pain/Comfort   Pain Rating no pain   Pain Rating Post-Intervention no pain   Bed Mobility   Bed Mobility yes   Rolling/Turning to Left Modified independent  (x 1 trial on mat )   Rolling/Turning Right Modified independent  (x 1 trial on mat )   Supine to Sit Modified Independent   Supine to Sit Comments x 1 trial on mat    Sit to Supine Modified Independent  (x1 trial on mat )   Transfers   Transfer yes   Sit to Stand   Sit <> Stand Assistance Supervision   Sit <> Stand Assistive Device Rolling Walker;Other (see comments)  (stair rails)   Trials/Comments Pt performed multiple trials of this transfer throughout PT treatment session   Stand to Sit   Assistance Supervision   Assistive Device Rolling Walker;Other (see comments)  (stair rails)   Trials/Comments Pt performed multiple trials of this transfer throughout PT treatment session. Decreased B knee flexion during sitting observed   Chair to Mat   Chair<> Mat Technique Stand Pivot   Chair<>Mat Assistance Supervision   Chair <> Mat Assistive Device Rolling Walker   Trials/Comments x 1 trial    Mat to Chair   Technique Stand Pivot   Assistance Supervision   Assistive Device Rolling Walker   Trials/Comments x 1 trial    Car Transfer   Car Transfer Technique Stand Pivot   Car Transfer Assistance Supervision   Car Transfer Assistive Device Rolling Walker " "  Trials/Comments x 1 trial, verbal cues required for appropriate sequence of this transfer. Pt was able to bring B LE into and out of car independently to complete this transfer    Wheelchair Activities   Propulsion Yes   Propulsion Type 1 Manual   Level 1 Level tile   Method 1 Right upper extremity;Left upper extremity   Level of Assistance 1 Modified Independent   Description/ Details 1 Pt propelled self in wheelchair x 300 feet   Gait   Gait Yes   Weight Bearing Status WBAT to B LE    Gait 1   Surface 1 Level tile;Carpet;Ramp   Gait Assistive Device Rolling walker   Assistance 1 Stand by Assistance   Gait Distance Pt ambulated x 1 trial of approximately 400 feet. No episodes of LOB occurred. Decreased B knee flexion during swing phase observed. Pt was able to ambulate up carpeted ramp with RW and SBA. Pt performed 2 turns with no episodes of LOB during ambulation trials    Gait Pattern swing-through gait   Gait Deviation(s) decreased nicko;increased time in double stance;decreased velocity of limb motion;decreased step length;decreased stride length;decreased toe-to-floor clearance;decreased weight-shifting ability;forward lean;other (see comments)  (decreased B knee flexion)   Impairments Contributing to Gait Deviations impaired balance;decreased strength;decreased ROM;pain   Stairs   Stairs Yes   Stairs/Curb Step   Rails 1 Bilateral   Device 1 No device   Assistance 1 Stand by Assistance   Comment/# Steps 1 Pt ascended and descended 12 6" steps with B rails and non reciprocal LE gait pattern x 1 trial.    Stairs/ Curb Step  2   Rails 2 None (Comment)   Device 2 Rolling walker   Assistance 2 Stand by Assistance   Comment/# Steps 2 Pt negotiated 4" curb step with RW and SBA x 1 trial each. Verbal cues required for appropriate sequence of this transfer with RW   Supine   Supine-Exercises Lower extremity;Specific exercises   Supine-Exercise Type SLR;Short arc quads   Supine-Exercise Comments Pt performed 3 x 15 " reps to B LE, pt required verbal cues to perform exercises with appropriate technique. Pt performed supine B knee extension stretch over bolster in order to improve pt's B knee extension ROM x 3 minutes.    Seated   Seated-Exercises Lower extremity;Specific exercises   Seated-Exercise Type Long arc quads;Knee flex;Hip flexion   Seated-Exercise Comments 3 x 15 reps to B LE. Pt performed seated B progressive knee flexion exercises x 20 reps to B LE in order to improve pt's B knee flexion ROM       ROM: Left Knee   (Degrees) Extension 0   (Degrees) Flexion 113       ROM: Right Knee   (Degrees) Extension 0   (Degrees) Flexion 109   Activity Tolerance   Activity Tolerance Patient tolerated treatment well   After Treatment   Patient Position After Treatment Seated in wheelchair   Patient after treatment left (in gym awaiting group)   Assessment   Prognosis Good   Problem List Decreased strength;Decreased range of motion;Decreased endurance;Impaired balance;Decreased mobility;Pain   Session Assessment progressing toward goals   Assessment Pt has progressed extremely well during his stay on IP rehab. Pt was able to achieve all short and long term goals for physical therapy during his stay. Pt requires between SBA to supervision for all transfers and ambulation. Pt will continue to benefit from further skilled PT intervention upon D/C in order to improve pt's functional independence with mobility. Pt will require use of RW and bedside commode upon D/C.    Level of Motivation/Participation good   Barriers to Discharge Inaccessible home environment   Barriers to Discharge Comments 1 DEMOND home, steps within home, limited assistance at home as pt's wife works during day    Short Term Goals   Supine to Sit-Met/Not Met Met   Sit to Supine - Met/Not Met Met   Logroll - Met/Not Met Met   Transfer Sit to stand - Met/Not Met Met   Transfer Bed/Chair - Met/Not Met Met   Ambulate - Met/Not Met Met   Tolerate Exercise - Met/Not Met Met    Other Goal Met   Other Goal 2 Met   Long Term Goals   Supine to Sit-Met/Not Met Met   Sit to Supine - Met/Not Met Met   Logroll - Met/Not Met Met   Transfer Sit to stand - Met/Not Met Met   Transfer Bed/Chair - Met/Not Met Met   Ambulate - Met/Not Met Met   Go Up/Down Stairs - Met/Not Met Met   Go Up/Down Curb- Met/Not Met Met   Tolerate Exercise - Met/Not Met Met   Propel Wheelchair - Met/Not Met Met   Other Goal Met   Other Goal 2 Met   Discharge Recommendations   Equipment Needed After Discharge 3-in-1 commode;wheelchair;walker, rolling   Discharge Facility/Level Of Care Needs outpatient PT;home health PT   Treatment/Billable Minutes   Gait training 15   Therapeutic Activity 25   Therapeutic Exercise 20   Total Time 60     Linda West, PT  1/16/2017

## 2017-01-16 NOTE — PROGRESS NOTES
"Occupational Therapy  AM Treatment  Doug Garibay   MRN: [id number]   Room/Bed: E255/E255      01/16/17 0904   OT Time Calculation   OT Start Time 0904   OT Stop Time 0949   OT Total Time (min) 45 min   General   OT Date of Treatment 01/16/17   Family/Caregiver Present No   Patient Found (position) Seated at edge of bed   Precautions   General Precautions fall   Orthopedic Yes   RLE Weight Bearing WBAT   LLE Weight Bearing WBAT   Subjective   Patient states "I'm feeling good today"   Pain/Comfort   Pain Rating no pain   Transfers   Transfer yes   Sit to Stand   Sit <> Stand Assistance Modified Independent   Sit <> Stand Assistive Device Rolling Walker   Trials/Comments Mod I for sit to stand from EOB to RW   Stand to Sit   Assistance Modified Independent   Assistive Device Rolling Walker   Trials/Comments Mod I for stand to sit with RW   Bed to Chair   Bed <> Chair Technique Stand Pivot   Bed <> Chair Transfer Assistance Modified Independent   Bed <> Chair Assistive Device Rolling Walker   Chair to Bed   Technique Stand Pivot   Assistance Modified Independent   Assistive Device Rolling Walker   Shower Transfer   Shower Transfer Technique Stand Pivot   Shower Transfer Assistance Modified Independent   Shower Transfer Assistive Device Rolling Walker   Toilet Transfer   Toilet Transfer Technique Stand Pivot   Toilet Transfer Assistance Modified Independent   Toilet Transfer Assistive Device Rolling Walker   Feeding   Feeding Level of Assistance Independent   Feeding Where Assessed Wheelchair   Grooming   Grooming Level of Assistance Independent   Grooming Where Assessed Edge of bed   Bathing   Bathing Level of Assistance Modified independent   Bathing Where Assessed Edge of bed   UE Dressing   UE Dressing Level of Assistance Independent   UE Dressing Where Assessed Edge of bed   LE Dressing   LE Dressing Yes   LE Dressing  Level of Assistance Modified independent   LE Dressing Level of Assistance Modified " independent   Sock Level of Assistance Independent   Shoe Level of Assistance Independent   LE Dressing Where Assessed Edge of bed   Toileting   Toileting Level of Assistance Modified independent   Toileting Where Assessed Bedside commode   Therapeutic Exercise - Strength   Strength Yes   Exercise Tools   Bilateral Marty 10# 5x20 reps inclined in order to increase upper body strength   Balance   Balance Yes   Dynamic Standing Balance   Dynamic Standing-Balance Support No upper extremity supported   Dynamic Standing-Balance Reaching across midline   Dynamic Standing-Comments Mod I to complete BITS sequence game in standing in order to promote balance while shifting weight. Pt completed game in standing for 11 minutes, 37 seconds with 63.29% accuracy for 50 hits with 2 seated rest breaks.    Activity Tolerance   Activity Tolerance Patient tolerated treatment well   After Treatment   Patient Position After Treatment Seated in wheelchair   Patient after treatment left (Seated in wheelchair in therapy gym prior to PT  session)   Assessment   Prognosis Good   Problem List Decreased Self Care skills;Decreased upper extremity strength;Decreased endurance;Decreased functional mobility;Decreased IADLs   Assessment Pt tolerated tx session well today. Pt did not c/o pain prior to or during OT session. Pt perfomed UB and LB dressing as well as toilet and shower t/fs with increased independence, requiring mod I with a RW. Pt reached his all goals requiring mod I for functional transfers as well as (I) for UE dressing.    Barriers to Discharge None   Long Term Goals   Pt Will Perform Supine To Sit Modified Independently   Supine to Sit - Met/Not Met Met   Pt Will Transfer Sit to Stand Modified Independently   Transfer Sit to stand - Met/Not Met Met   Pt Will Transfer Bed/Chair Modified Independently   Transfer Bed/Chair - Met/Not Met Met   Pt Will Transfer To Bedside Commode With modified independence   Pt Will Transfer To Shower  With modified independence   Pt Will Perform Eating Independently   Eating - Met/Not Met Met   Pt Will Perform Grooming Independently   Grooming - Met/Not Met Met   Pt Will Perform Bathing With modified independence   Bathing - Met/Not Met Met   Pt Will Perform UE Dressing Independently   UE Dressing - Met/Not Met Met   Pt Will Perform LE Dressing With modified independence   LE Dressing - Met/Not Met Met   Pt Will Perform Toileting With modified independence   Toileting-Met/Not Met Met   Discharge Recommendations   Equipment Needed After Discharge 3-in-1 commode;walker, rolling;wheelchair   Discharge Facility/Level Of Care Needs outpatient PT   Plan   Plan Alter current plan   Plan Comments (No services needed )   Occupational Therapy Follow-up   OT Follow-up? No   Treatment/Billable Minutes   Self Care/Home Management 13   Neuromuscular Re-ed 32   Total Time 45       ANDI Barron  1/16/2017    LEGEND:   CGA: Contact Guard Assist   EOB: Edgeof Bed   HHA: Hand Held Assist   HOB: Head of Bed   (I): Independent-patient performs task in a timely manner   Max (A): Maximal Assist-patient performs 25-49% of task   Min (A): Minimal Assist- patient performs 75% or more of task   Mod (A): Moderate Assist- patient performs 50-74% of task   NA: Not applicable   NT: Not tested   OOB: Out of Bed   PTA: Prior to admit   QC: Quad Cane   RW: Rolling Walker   (S): Supervision- patient requires cues, coaxing, prompting   SBA: Stand By Assist   SC: Straight Cane   SW: Standard Walker   TBA: To be assessed   Total (A): Total Assist- patient performs less than 25% of task   WC: Wheelchair   WFL: Within Functional Limits   WNL: Within Normal Limits

## 2017-01-16 NOTE — PROGRESS NOTES
Pt without new c/o.    Interval hx:  I have reviewed the pt's HPI and PFSH and it is unchanged from admission.     VSS - Temp:  [98.7 °F (37.1 °C)] 98.7 °F (37.1 °C)  Pulse:  [71-84] 71  Resp:  [16-17] 16  SpO2:  [93 %-98 %] 93 %  BP: (137-149)/(85-87) 149/85    Review of Systems   Eyes: Negative for blurred vision.   Respiratory: Negative for cough.   Cardiovascular: Negative for chest pain.   Gastrointestinal: Negative for nausea and vomiting.   Genitourinary: Negative for dysuria.   Musculoskeletal: Positive for joint pain. Negative for myalgias.   Skin: Negative.   Neurological: Positive for weakness. Negative for dizziness, sensory change and headaches.   Psychiatric/Behavioral: Negative for depression. The patient has insomnia.      Physical Exam   Constitutional: He is oriented to person, place, and time. He appears well-nourished.   Eyes: EOM are normal. Pupils are equal, round, and reactive to light.   Neck: Normal range of motion. Neck supple.   Cardiovascular: Normal rate.   Pulmonary/Chest: Effort normal.   Abdominal: Soft.   Musculoskeletal:   BUEs AROM WNL  BLEs AROM diminished at the knees and with HF   Neurological: He is oriented to person, place, and time.   BUEs 5/5  BLEs 4/5 HF, 4+/5 HE, 4/5 KF/KE, 5/5 DF/PF   Skin: No rash noted.   Psychiatric: He has a normal mood and affect.     Assessment:    1. S/P bilateral TKAs -- no signs/infxn.  Sit-->stand SBA (improved), T/Fs SBA (improved), gait 217' SBA (improved).   L  deg, ext 0 deg.  R  deg, ext -1 deg.  UBD independent, LBD SBA.  Oxycontin 10mg d/c'd.  2. HTN -- controlled.  Changed hydralazine 75mg TID to Toprol XL 100mg to simplify his regimen some.    3. CKD -- stable.  CRT 1.8 on 1/14, slightly prerenal.  Cont to encourage fluids.        Future Appointments  Date Time Provider Department Center   1/18/2017 1:30 PM Alice Tony NP [facility]illo

## 2017-01-16 NOTE — PROGRESS NOTES
"Occupational Therapy   Discharge Summary    Doug Garibay  MRN: [id number]  Room/Bed: E255/E255 B     01/16/17 0904   OT Time Calculation   OT Start Time 0904   OT Stop Time 0949   OT Total Time (min) 45 min   General   OT Date of Treatment 01/16/17   Family/Caregiver Present No   Patient Found (position) Seated at edge of bed   Precautions   General Precautions fall   Orthopedic Yes   RLE Weight Bearing WBAT   LLE Weight Bearing WBAT   Subjective   Patient states "I'm feeling good today"   Pain/Comfort   Pain Rating no pain   Transfers   Transfer yes   Sit to Stand   Sit <> Stand Assistance Modified Independent   Sit <> Stand Assistive Device Rolling Walker   Trials/Comments Mod I for sit to stand from EOB to RW   Stand to Sit   Assistance Modified Independent   Assistive Device Rolling Walker   Trials/Comments Mod I for stand to sit with RW   Bed to Chair   Bed <> Chair Technique Stand Pivot   Bed <> Chair Transfer Assistance Modified Independent   Bed <> Chair Assistive Device Rolling Walker   Chair to Bed   Technique Stand Pivot   Assistance Modified Independent   Assistive Device Rolling Walker   Shower Transfer   Shower Transfer Technique Stand Pivot   Shower Transfer Assistance Modified Independent   Shower Transfer Assistive Device Rolling Walker   Toilet Transfer   Toilet Transfer Technique Stand Pivot   Toilet Transfer Assistance Modified Independent   Toilet Transfer Assistive Device Rolling Walker   Feeding   Feeding Level of Assistance Independent   Feeding Where Assessed Wheelchair   Grooming   Grooming Level of Assistance Independent   Grooming Where Assessed Edge of bed   Bathing   Bathing Level of Assistance Modified independent   Bathing Where Assessed Edge of bed   UE Dressing   UE Dressing Level of Assistance Independent   UE Dressing Where Assessed Edge of bed   LE Dressing   LE Dressing Yes   LE Dressing  Level of Assistance Modified independent   LE Dressing Level of Assistance Modified " independent   Sock Level of Assistance Independent   Shoe Level of Assistance Independent   LE Dressing Where Assessed Edge of bed   Toileting   Toileting Level of Assistance Modified independent   Toileting Where Assessed Bedside commode   Therapeutic Exercise - Strength   Strength Yes   Exercise Tools   Bilateral Marty 10# 5x20 reps inclined in order to increase upper body strength   Balance   Balance Yes   Dynamic Standing Balance   Dynamic Standing-Balance Support No upper extremity supported   Dynamic Standing-Balance Reaching across midline   Dynamic Standing-Comments Mod I to complete BITS sequence game in standing in order to promote balance while shifting weight. Pt completed game in standing for 11 minutes, 37 seconds with 63.29% accuracy for 50 hits with 2 seated rest breaks.    Activity Tolerance   Activity Tolerance Patient tolerated treatment well   After Treatment   Patient Position After Treatment Seated in wheelchair   Patient after treatment left (Seated in wheelchair in therapy gym prior to PT  session)   Assessment   Prognosis Good   Problem List Decreased Self Care skills;Decreased upper extremity strength;Decreased endurance;Decreased functional mobility;Decreased IADLs   Assessment Pt tolerated tx session well today. Pt did not c/o pain prior to or during OT session. Pt perfomed UB and LB dressing as well as toilet and shower t/fs with increased independence, requiring mod I with a RW. Pt reached his all goals requiring mod I for functional transfers as well as (I) for UE dressing.    Barriers to Discharge None   Long Term Goals   Pt Will Perform Supine To Sit Modified Independently   Supine to Sit - Met/Not Met Met   Pt Will Transfer Sit to Stand Modified Independently   Transfer Sit to stand - Met/Not Met Met   Pt Will Transfer Bed/Chair Modified Independently   Transfer Bed/Chair - Met/Not Met Met   Pt Will Transfer To Bedside Commode With modified independence   Pt Will Transfer To Shower  With modified independence   Pt Will Perform Eating Independently   Eating - Met/Not Met Met   Pt Will Perform Grooming Independently   Grooming - Met/Not Met Met   Pt Will Perform Bathing With modified independence   Bathing - Met/Not Met Met   Pt Will Perform UE Dressing Independently   UE Dressing - Met/Not Met Met   Pt Will Perform LE Dressing With modified independence   LE Dressing - Met/Not Met Met   Pt Will Perform Toileting With modified independence   Toileting-Met/Not Met Met   Discharge Recommendations   Equipment Needed After Discharge 3-in-1 commode;walker, rolling;wheelchair   Discharge Facility/Level Of Care Needs outpatient PT   Plan   Plan Alter current plan   Plan Comments (No services needed )   Occupational Therapy Follow-up   OT Follow-up? No   Treatment/Billable Minutes   Self Care/Home Management 13   Neuromuscular Re-ed 32   Total Time 45       ANDI Barron  1/16/2017    LEGEND:   CGA: Contact Guard Assist   EOB: Edge of Bed   HHA: Hand Held Assist   HOB: Head of Bed   (I): Independent-patient performs task in a timely manner   Max (A): Maximal Assist-patient performs 25-49% of task   Min (A): Minimal Assist- patient performs 75% or more of task   Mod (A): Moderate Assist- patient performs 50-74% of task   NA: Not applicable   NT: Not tested   OOB: Out of Bed   PTA: Prior to admit   QC: Quad Cane   RW: Rolling Walker   (S): Supervision- patient requires cues, coaxing, prompting   SBA: Stand By Assist   SC: Straight Cane   SW: Standard Walker   TBA: To be assessed   Total (A): Total Assist- patient performs less than 25% of task   WC: Wheelchair   WFL: Within Functional Limits   WNL: Within Normal Limits

## 2017-01-16 NOTE — PROGRESS NOTES
Physical Therapy   Treatment/Transfer Group    Doug Garibay   MRN: [id number]                  01/16/17 1116   PT Time Calculation   PT Start Time 1116   PT Stop Time 1201   PT Total Time (min) 45 min   Treatment   Treatment Type Treatment;Other (see comments)  (Transfer Group)   PT/PTA PT   General   PT Received On 01/16/17   Family/Caregiver Present No   Patient Found (position) Seated in wheelchair   Precautions   RLE Weight Bearing WBAT   LLE Weight Bearing WBAT   Subjective   Patient states he is going home tomorrow.   Pain/Comfort   Pain Rating no pain   Pain Rating Post-Intervention no pain   Activity Tolerance   Activity Tolerance Patient tolerated treatment well   After Treatment   Patient Position After Treatment Seated in wheelchair   Patient after treatment left (pt propels w/c back to his room)   Assessment   Prognosis Good   Plan   Planned Therapy Intervention Continue with current plan   Therapy Frequency 2 times/day;daily;Monday-Friday;Saturday or Sunday   Physical Therapy Follow-up   PT Follow-up? Yes   PT - Next Visit Date 01/16/17   Treatment/Billable Minutes   Therapeutic Activity Group 45   Total Time 45           Patient participated in 45 minute functional transfers group. The group activity challenged bilateral upper extremity and lower extremity strengthening. In addition, cardiovascular and functional endurance were challenged during this group.  Pt performs various UE dowel exercises, beach ball toss and tap and LE ex of hip flex, LAQ, AP, 15-20 reps each. Stand pivot transfers w/c<> mat with SBA. Patient completed bed mobility from supine to sitting edge of mat with __independence. Patient completed toilet transfer with _stand by___ assistance and use of 3 in 1 commode.  Patient completed (10) sit to stand transfers from the w/c with _stand by__ assistance .Patient demonstrated appropriate safety awareness with functional transfers. Educated patient on compensatory and adaptive  techniques for functional home transfers. Patient demonstrated increased independence with all transfers (or list specific transfers). These activities were performed in a group setting to encourage increased participation with peers and social interaction skills.              Grant Romano, PT 1/16/2017

## 2017-01-16 NOTE — TREATMENT PLAN
"  Rehab Services' DME recommendations    Doug Garibay   MRN: [id number]        [] NO DME NEEDED ________________________________________________________________________    [x]Walker:(can only select one of these)  [x]Adult (5'4"-6'6") []Sheng (4'4"-5'7"      [] Wide/ Heavy Duty  Sundar        []  Rollator        [] knee walker  Accessories/Other:________  Wheels:(must complete) [x] Yes  [] No     []Crutches:  []Axillary []Loft strand    []Cane:              []Straight []Narrow based quad   []Wide based quad         [] Other:____________________________________________    [] Transfer Devices:   []João Lift    []Slide Board ( [] with cut out  []26  []29)    ______________________________________________________________________    []Wheelchair: (please check)   Number of hours up in wheelchair per day:___     Style:  [] Standard [] Pediatric  [] Light weight  []Reclining     []Amputee [] Sundar [] POV []Custom     Custom Vendor:________________________________________                                                      Seat Width: []18 (standard adult)    []16" (small adult)   []14(child)      [] 20  [] 22 [] 24         Seat Depth:   []16    []18  []20      Back Height:    []Standard      []Sundar          []Other     Leg Support: []Standard []Heel loops []Elevating leg rest    []Articulating              []Swing Away     Arm Height:  []Detachable []Full   [] Desk  []Swing Away [] Adjustable Height      Lap Belt: []Velcro []Buckle                               Accessories: [] Front brakes          [] Brake Extensions  []Anti-tippers                          []Safety Belt    Other:_______________________________________________     Cushion: []Basic []Foam []Gel  []Roho []Jose I        Justification for Cushion(Must complete):________________________________     For wheelchair order: (please choose all that apply)  - Caregiver is capable and willing to operate wheelchair safely. []  - Patients upper body " strength is sufficient for propulsion. []   - The patient has a cast, brace, or musculoskeletal condition which prevents 90 degree flexion of the knee. []  - The patient has significant edema of the lower extremities that requires elevating leg rests.  []  - A reclining back is ordered. []  - The patient requires the use of a wheel chair for ADLs within the home. []  - Patient mobility limitations cannot be sufficiently resolved by the use of other ambulatory therapies. []      [x]3 in 1 commode: [x]Standard     []Wide-Heavy Duty           []Drop arm                                      []Heavy Duty Drop Arm                              []Tub bench:  []Padded      [](Unpadded=standard)     []Commode Opening                                          [] Heavy Duty    []Shower Chair: []With back   []Without back      []Hospital Bed: []Semi Electric    []Manual    []Electric   []Heavy Duty  []Extra Heavy Duty(>600#)  Patient requires a bed height different than a fixed height hospital bed to permit transfers to chair, wheel chair or standing. []Yes         []N/A     []Accessories: [] Gel Overlay Mattress     []Low Air Mattress   []Alternating Pressure Pad                              []Trapeze    [] Hip Kit:  []Yes     []N/A     []Other:________________________________________________________________    [x]Home Health:  [x]OT [x]PT []SLP   [] MSW   [] Aide    []SN        []Outpatient:  []OT []PT []SLP [] MSW   [] CM      [x] Internal Referral      [] External Referral  ________________________________________________________________________    Radha French 1/16/2017

## 2017-01-16 NOTE — PROGRESS NOTES
Occupational Therapy  [facility]    Doug Garibay  MRN: [id number]  Room/Bed: E255/E255 B       01/16/17 0904   Transfers   Bed/Chair/WC 6   Toilet 6   Tub 6   Shower 6   Self Care   Eating 7   Grooming 7   Bathing 6   Dressing-Upper 7   Dressing-Lower 6   Toileting 6       ANDI Barron  1/16/2017

## 2017-01-16 NOTE — PROGRESS NOTES
"Physical Therapy   PM PT Treatment Session    Doug Garibay   MRN: [id number]        01/16/17 1300   PT Time Calculation   PT Start Time 1259   PT Stop Time 1329   PT Total Time (min) 30 min   Treatment   Treatment Type Treatment   PT/PTA PT   General   PT Received On 01/16/17   Family/Caregiver Present No   Patient Found (position) Supine in bed   Precautions   General Precautions fall   Orthopedic Yes   RLE Weight Bearing WBAT   LLE Weight Bearing WBAT   Subjective   Patient states "This medicine just makes me so tired"   Pain/Comfort   Pain Rating no pain   Pain Rating Post-Intervention no pain   Treatment/Billable Minutes   Therapeutic Activity 20   Therapeutic Exercise 10   Total Time 30       Addendum to above:    Pt found supine in bed  Pt performed supine to sit in bed with mod (I)  Pt performed stand pivot transfer from EOB to wheelchair with SBA to supervision  Pt performed tub bench transfer with stand pivot transfer with no AD and SBA to supervision  Pt propelled self in wheelchair x 200 feet with mod (I) and B UE  Pt ambulated x 1 trial of 300 feet with RW and SBA   Pt performed seated B LE exercises 3 x 15 reps to B LE: hip flexion, LAQ  Pt left sitting upright in wheelchair with call button in reach    Linda West, PT  1/16/2017          "

## 2017-01-17 VITALS
OXYGEN SATURATION: 92 % | WEIGHT: 235.5 LBS | RESPIRATION RATE: 16 BRPM | HEIGHT: 70 IN | TEMPERATURE: 99 F | HEART RATE: 71 BPM | DIASTOLIC BLOOD PRESSURE: 80 MMHG | SYSTOLIC BLOOD PRESSURE: 153 MMHG | BODY MASS INDEX: 33.71 KG/M2

## 2017-01-17 PROCEDURE — 25000003 PHARM REV CODE 250: Performed by: STUDENT IN AN ORGANIZED HEALTH CARE EDUCATION/TRAINING PROGRAM

## 2017-01-17 PROCEDURE — 25000003 PHARM REV CODE 250: Performed by: PHYSICAL MEDICINE & REHABILITATION

## 2017-01-17 PROCEDURE — 99238 HOSP IP/OBS DSCHRG MGMT 30/<: CPT | Mod: ,,, | Performed by: PHYSICAL MEDICINE & REHABILITATION

## 2017-01-17 RX ORDER — OXYCODONE AND ACETAMINOPHEN 10; 325 MG/1; MG/1
1 TABLET ORAL 3 TIMES DAILY PRN
Qty: 70 TABLET | Refills: 0 | Status: SHIPPED | OUTPATIENT
Start: 2017-01-17

## 2017-01-17 RX ORDER — ASPIRIN 325 MG
325 TABLET ORAL 2 TIMES DAILY
Qty: 36 TABLET | Refills: 0 | Status: SHIPPED | OUTPATIENT
Start: 2017-01-17 | End: 2017-02-04

## 2017-01-17 RX ORDER — POLYETHYLENE GLYCOL 3350 17 G/17G
17 POWDER, FOR SOLUTION ORAL DAILY
Qty: 30 EACH | Refills: 0 | Status: SHIPPED | OUTPATIENT
Start: 2017-01-17

## 2017-01-17 RX ORDER — FERROUS SULFATE 325(65) MG
325 TABLET, DELAYED RELEASE (ENTERIC COATED) ORAL DAILY
Refills: 0 | COMMUNITY
Start: 2017-01-17

## 2017-01-17 RX ORDER — CHOLECALCIFEROL (VITAMIN D3) 25 MCG
1000 TABLET ORAL DAILY
COMMUNITY
Start: 2017-01-17

## 2017-01-17 RX ORDER — METOPROLOL SUCCINATE 100 MG/1
100 TABLET, EXTENDED RELEASE ORAL NIGHTLY
Qty: 30 TABLET | Refills: 3 | Status: SHIPPED | OUTPATIENT
Start: 2017-01-17 | End: 2017-05-27 | Stop reason: SDUPTHER

## 2017-01-17 RX ORDER — AMOXICILLIN 250 MG
1 CAPSULE ORAL 2 TIMES DAILY
COMMUNITY
Start: 2017-01-17

## 2017-01-17 RX ORDER — ASPIRIN 81 MG/1
81 TABLET ORAL DAILY
Refills: 0 | COMMUNITY
Start: 2017-02-05

## 2017-01-17 RX ADMIN — NIFEDIPINE 60 MG: 30 TABLET, FILM COATED, EXTENDED RELEASE ORAL at 07:01

## 2017-01-17 RX ADMIN — OXYCODONE HYDROCHLORIDE 5 MG: 5 TABLET ORAL at 07:01

## 2017-01-17 RX ADMIN — STANDARDIZED SENNA CONCENTRATE AND DOCUSATE SODIUM 1 TABLET: 8.6; 5 TABLET, FILM COATED ORAL at 07:01

## 2017-01-17 RX ADMIN — POLYETHYLENE GLYCOL 3350 17 G: 17 POWDER, FOR SOLUTION ORAL at 07:01

## 2017-01-17 RX ADMIN — FERROUS SULFATE TAB EC 325 MG (65 MG FE EQUIVALENT) 325 MG: 325 (65 FE) TABLET DELAYED RESPONSE at 07:01

## 2017-01-17 RX ADMIN — LEVOTHYROXINE SODIUM 150 MCG: 75 TABLET ORAL at 07:01

## 2017-01-17 RX ADMIN — FAMOTIDINE 20 MG: 20 TABLET, FILM COATED ORAL at 07:01

## 2017-01-17 RX ADMIN — VITAMIN D, TAB 1000IU (100/BT) 1000 UNITS: 25 TAB at 07:01

## 2017-01-17 RX ADMIN — ASPIRIN 325 MG: 325 TABLET, DELAYED RELEASE ORAL at 07:01

## 2017-01-17 NOTE — NURSING
Around 1330 Discharge pt per MD order. Instructed pt earlier that per Dr Tejeda, okay for his wife to bring him in some dramamine to prevent car sickness as he was requesting. Instructed pt on f/u appt, medications, that [facility] will be calling them to set up appts, Pt wife and sister in to take him home. Pt leaving with a walker and a shower chair.Pt safely transferred to his car with all belongings.

## 2017-01-17 NOTE — DISCHARGE INSTRUCTIONS
To clarify aspirin.  Take 325mg twice a day until 2/4/17.  On 2/4/17 resume 81mg per day routinely.      [facility] - [phone]

## 2017-01-17 NOTE — DISCHARGE SUMMARY
"[facility]  Discharge Summary      Admit Date: 1/6/2017    Discharge Date and Time: 1/17/17    Attending Physician: Arnold Tejeda MD     Reason for Admission: Bilateral TKAs    Hospital Course (synopsis of major diagnoses, care, treatment, and services provided during the course of the hospital stay):  Doug Garibay is a 56-year-old male with PMHx of HTN, HLD, JUAN RAMON, arthritis, gout, and hypothyroidism. Patient presented to ortho clinic on 12/28/16 for bilateral knee pain unrelieved by conservative/non-operative measures. Decision was made to undergo bilateral knee replacements. Admitted to [facility] 1/4/17 for bilateral TKAs done on the day of admission. The pt's postop course was cx by impaired mobility and ability to perform ADLs including sit-->stand MXA, T/Fs MXA, gait 8' MDA, UB dressing MDA, LB dressing MXA. He was admitted to [facility] to address the mobility and ADL impairments. His CC was "walking".     The pt was started in the rehab program with very good results.  Initially pain control was an issue.  This was improved with addition of Oxycontin 10mg daily which has since been d/c'd.  At d/c he requires:  Sit-->stand SBA (improved), T/Fs SBA (improved), gait 217' SBA (improved). L  deg, ext 0 deg. R  deg, ext -1 deg. UBD independent, LBD SBA.  His wounds are dry with no signs of infxn -- to see Ortho 1/18.  Hydralazine 75mg TID was changed to Toprol XL 50mg q day with good control of HTN.  He will discuss this change with his PCP.      Final Diagnoses:    Principal Problem: S/P bilateral unicompartmental knee replacement   Secondary Diagnoses:   Active Hospital Problems    Diagnosis  POA    *S/P bilateral unicompartmental knee replacement [Z96.653]  Not Applicable    Benign essential HTN [I10]  Yes    CKD (chronic kidney disease) stage 3, GFR 30-59 ml/min [N18.3]  Yes      Resolved Hospital Problems    Diagnosis Date Resolved POA   No resolved problems to display. "       Discharged Condition: good    Disposition: Home or Self Care    Follow Up/Patient Instructions:     Medications:  Reconciled Home Medications:   Discharge Medication List as of 1/17/2017 12:34 PM      START taking these medications    Details   metoprolol succinate (TOPROL-XL) 100 MG 24 hr tablet Take 1 tablet (100 mg total) by mouth every evening., Starting 1/17/2017, Until Wed 1/17/18, Normal      polyethylene glycol (GLYCOLAX) 17 gram PwPk Take 17 g by mouth once daily., Starting 1/17/2017, Until Discontinued, Normal      senna-docusate 8.6-50 mg (PERICOLACE) 8.6-50 mg per tablet Take 1 tablet by mouth 2 (two) times daily., Starting 1/17/2017, Until Discontinued, OTC      vitamin D 1000 units Tab Take 1 tablet (1,000 Units total) by mouth once daily., Starting 1/17/2017, Until Discontinued, OTC         CONTINUE these medications which have CHANGED    Details   aspirin (ECOTRIN) 81 MG EC tablet Take 1 tablet (81 mg total) by mouth once daily. HOLD UNTIL ASPIRIN 325 MG TWICE DAILY IS COMPLETE; RESUME 2/5/17, Starting 2/5/2017, Until Discontinued, OTC      aspirin 325 MG tablet Take 1 tablet (325 mg total) by mouth 2 (two) times daily. (End 2/4/17), Starting 1/17/2017, Until Sat 2/4/17, Normal      ferrous sulfate 325 (65 FE) MG EC tablet Take 1 tablet (325 mg total) by mouth once daily., Starting 1/17/2017, Until Discontinued, OTC      oxycodone-acetaminophen (PERCOCET)  mg per tablet Take 1 tablet by mouth 3 (three) times daily as needed for Pain., Starting 1/17/2017, Until Discontinued, Normal      vit [id number]-[id number] (BIOFLEX) 887-51-76-40 mg Tab Take 1 tablet by mouth once daily. HOLD UNTIL TOLD TO RESUME BY PHYSICIAN, Starting 1/17/2017, Until Discontinued, No Print         CONTINUE these medications which have NOT CHANGED    Details   levothyroxine (SYNTHROID) 150 MCG tablet Take 150 mcg by mouth once daily., Until Discontinued, Historical Med      nifedipine (ADALAT CC) 60 MG TbSR Take  "60 mg by mouth once daily. , Until Discontinued, Historical Med      acetaminophen (TYLENOL) 500 MG tablet Take 500 mg by mouth every 6 (six) hours as needed for Pain., Until Discontinued, Historical Med      simvastatin (ZOCOR) 20 MG tablet TAKE ONE TABLET BY MOUTH IN THE EVENING, Normal         STOP taking these medications       ondansetron (ZOFRAN-ODT) 4 MG TbDL Comments:   Reason for Stopping:               Discharge Procedure Orders  WALKER FOR HOME USE   Order Specific Question Answer Comments   Type of Walker: Adult (5'4"-6'6")    With wheels? Yes    Height: 5' 10" (1.778 m)    Weight: 106.8 kg (235 lb 8 oz)    Length of need (1-99 months): 99    Does patient have medical equipment at home? cane, straight    Does patient have medical equipment at home? shower chair    Please check all that apply: Patient's condition impairs ambulation.    Please check all that apply: Walker will be used for gait training.    Vendor: [facility]    Expected Date of Delivery: 1/17/2017      COMMODE FOR HOME USE   Order Specific Question Answer Comments   Type: Standard    Height: 5' 10" (1.778 m)    Weight: 106.8 kg (235 lb 8 oz)    Does patient have medical equipment at home? cane, straight    Does patient have medical equipment at home? shower chair    Length of need (1-99 months): 99    Vendor: [facility]    Expected Date of Delivery: 1/17/2017      Referral to Home health   Referral Priority: Routine Referral Type: Home Health Care   Referral Reason: Specialty Services Required    Requested Specialty: Home Health Services    Number of Visits Requested: 1      Diet general   Order Specific Question Answer Comments   Na restriction, if any: 3gNa      Activity as tolerated         "

## 2017-01-17 NOTE — PROGRESS NOTES
SW delivered rolling walker and commode to bedside.  Home health arranged with [facility].  Pt to be transported home by spouse.

## 2017-01-18 ENCOUNTER — OFFICE VISIT (OUTPATIENT)
Dept: ORTHOPEDICS | Facility: CLINIC | Age: 57
End: 2017-01-18
Payer: COMMERCIAL

## 2017-01-18 ENCOUNTER — HOSPITAL ENCOUNTER (OUTPATIENT)
Dept: RADIOLOGY | Facility: HOSPITAL | Age: 57
Discharge: HOME | End: 2017-01-18
Attending: NURSE PRACTITIONER
Payer: COMMERCIAL

## 2017-01-18 VITALS
RESPIRATION RATE: 19 BRPM | WEIGHT: 235 LBS | TEMPERATURE: 98 F | DIASTOLIC BLOOD PRESSURE: 78 MMHG | HEART RATE: 71 BPM | SYSTOLIC BLOOD PRESSURE: 120 MMHG | BODY MASS INDEX: 33.64 KG/M2 | HEIGHT: 70 IN

## 2017-01-18 DIAGNOSIS — Z96.653 S/P TOTAL KNEE REPLACEMENT, BILATERAL: Primary | ICD-10-CM

## 2017-01-18 DIAGNOSIS — Z96.653 S/P TOTAL KNEE REPLACEMENT, BILATERAL: ICD-10-CM

## 2017-01-18 PROCEDURE — 99024 POSTOP FOLLOW-UP VISIT: CPT | Mod: S$GLB,,, | Performed by: NURSE PRACTITIONER

## 2017-01-18 PROCEDURE — 73562 X-RAY EXAM OF KNEE 3: CPT | Mod: 26,50,, | Performed by: RADIOLOGY

## 2017-01-18 PROCEDURE — 73562 X-RAY EXAM OF KNEE 3: CPT | Mod: TC,50

## 2017-01-18 PROCEDURE — 99999 PR PBB SHADOW E&M-EST. PATIENT-LVL V: CPT | Mod: PBBFAC,,, | Performed by: NURSE PRACTITIONER

## 2017-01-18 NOTE — PROGRESS NOTES
"Doug Garibay presents for initial post-operative visit following a bilateral total knee arthroplasty performed by Dr. Escamilla on 1/4/2017. Tolerating pain medication well.      Exam:   Visit Vitals    /78    Pulse 71    Temp 97.5 °F (36.4 °C) (Oral)    Resp 19    Ht 5' 10" (1.778 m)    Wt 106.6 kg (235 lb)    BMI 33.72 kg/m2     Ambulating well with assistive device. Has started using the cane at home  Incision is clean and dry without drainage or erythema. ROM:0-125 on the right, a 0-120 on the left    Post-operative radiographs reviewed today revealing a well fixed and aligned prosthesis.    A/P:  2 weeks s/p bilateral total knee replacement   - The patient was advised to keep the incision clean and dry for the next 24 hours after which he may wash the area with antibacterial soap in the shower. Will not submerge until the incision is completely healed.   - Outpatient PT: orders entered for [place] location. He has been having PT at rehab and now at home  - Continue aspirin for 1 month from surgery.  - Pain medication: Pt is only taking one at bedtime daily  - Follow up in 4 weeks with Dr. Escamilla. Pt will call clinic with problems/concerns.     "

## 2017-01-19 NOTE — PROGRESS NOTES
01/07/17 0914   Eating   Assistance Needed Independent   Physical Assistance Level No physical assistance   CARE Score 6   Oral Hygiene   Assistance Needed Set-up / clean-up   Physical Assistance Level No physical assistance   CARE Score 5   Toileting Hygiene   Assistance Needed Incidental touching   CARE Score 4   Shower/Bathe Self   Assistance Needed Physical assistance   Physical Assistance Level Less than 25%   CARE Score 3   Upper Body Dressing   Assistance Needed Set-up / clean-up   CARE Score 5   Lower Body Dressing   Assistance Needed Physical assistance   Physical Assistance Level Less than 25%   CARE Score 3   Putting On/Taking Off Footwear   Assistance Needed Physical assistance   Physical Assistance Level Less than 25%   CARE Score 3

## 2017-01-19 NOTE — PROGRESS NOTES
"   01/07/17 0913   Cognitive Pattern Assessment   Cognitive Pattern Assessment Used BIMS   Brief Interview for Mental Status (BIMS)   Repetition of Three Words (First Attempt) 3   Temporial Orientation: Year Correct   Temporal Orientation: Month Accurate within 5 days   Temporal Orientation: Day Correct   Recall: "Sock" Yes, no cue required   Recall: "Blue" Yes, after cueing ("a color")   Recall: "Bed" No, could not recall   BIMS Summary Score 12     "

## 2017-01-19 NOTE — PROGRESS NOTES
01/07/17 1532   Roll Left and Right   Assistance Needed Set-up / clean-up   CARE Score 5   Sit to Lying   Assistance Needed Set-up / clean-up   CARE Score 5   Lying to Sitting on Side of Bed   Assistance Needed Set-up / clean-up   CARE Score 5   Sit to Stand   Physical Assistance Level Total assistance   CARE Score 1   Chair/Bed-to-Chair Transfer   Physical Assistance Level Total assistance   CARE Score 1   Chair/Bed-to-Chair Transfer Goal 5   Toilet Transfer   Physical Assistance Level Total assistance   CARE Score 1   Car Transfer   Reason if not Attempted Safety concerns   CARE Score 88

## 2017-01-19 NOTE — PROGRESS NOTES
01/07/17 1533   Walk 10 Feet   Assistance Needed Physical assistance   Physical Assistance Level Total assistance   CARE Score 1   Walk 50 Feet with Two Turns   Reason if not Attempted Safety concerns   CARE Score 88   Walk 150 Feet   Reason if not Attempted Safety concerns   CARE Score 88   Walking 10 Feet on Uneven Surfaces   Assistance Needed Physical assistance   Physical Assistance Level Less than 25%   CARE Score 3   1 Step (Curb)   Reason if not Attempted Safety concerns   CARE Score 88   4 Steps   Reason if not Attempted Safety concerns   CARE Score 88   12 Steps   Reason if not Attempted Safety concerns   CARE Score 88   Picking Up Object   Reason if not Attempted Safety concerns   CARE Score 88   Does the patient use a wheelchair/scooter? Yes-->Continue to Wheel 50 feet with two turns.   Wheel 50 Feet with Two Turns   Assistance Needed Set-up / clean-up   CARE Score 5   Indicate the type of wheelchair/scooter used Manual   Wheel 150 Feet   Assistance Needed Set-up / clean-up   CARE Score 5   Indicate the type of wheelchair/scooter used Manual

## 2017-01-19 NOTE — PROGRESS NOTES
01/16/17 1338   Walk 10 Feet   Assistance Needed Set-up / clean-up   CARE Score 5   Walk 50 Feet with Two Turns   Assistance Needed Set-up / clean-up   CARE Score 5   Walk 150 Feet   Assistance Needed Set-up / clean-up   CARE Score 5   Walking 10 Feet on Uneven Surfaces   Assistance Needed Set-up / clean-up   CARE Score 5   1 Step (Curb)   Assistance Needed Set-up / clean-up   CARE Score 5   4 Steps   Assistance Needed Set-up / clean-up   CARE Score 5   12 Steps   Assistance Needed Set-up / clean-up   CARE Score 5   Picking Up Object   Assistance Needed Set-up / clean-up   CARE Score 5   Wheel 50 Feet with Two Turns   Assistance Needed Independent   CARE Score 6   Indicate the type of wheelchair/scooter used Manual   Wheel 150 Feet   Assistance Needed Independent   CARE Score 6   Indicate the type of wheelchair/scooter used Manual

## 2017-01-19 NOTE — PROGRESS NOTES
01/07/17 0922   Hearing, Speech, and Vision   Expression of Ideas and Wants Without difficulty   Understanding Verbal Content Understands

## 2017-01-19 NOTE — PROGRESS NOTES
01/16/17 1338   Roll Left and Right   Assistance Needed Independent   CARE Score 6   Sit to Lying   Assistance Needed Independent   CARE Score 6   Lying to Sitting on Side of Bed   Assistance Needed Independent   CARE Score 6   Sit to Stand   Assistance Needed Set-up / clean-up   CARE Score 5   Chair/Bed-to-Chair Transfer   Assistance Needed Independent   CARE Score 6   Toilet Transfer   Assistance Needed Independent   CARE Score 6   Car Transfer   Assistance Needed Set-up / clean-up   CARE Score 5

## 2017-01-19 NOTE — PROGRESS NOTES
01/16/17 1336   Eating   Assistance Needed Independent   CARE Score 6   Oral Hygiene   Assistance Needed Independent   CARE Score 6   Toileting Hygiene   Assistance Needed Independent   CARE Score 6   Shower/Bathe Self   Assistance Needed Independent   CARE Score 6   Upper Body Dressing   Assistance Needed Independent   CARE Score 6   Lower Body Dressing   Assistance Needed Independent   CARE Score 6   Putting On/Taking Off Footwear   Assistance Needed Independent   CARE Score 6

## 2017-01-24 ENCOUNTER — CLINICAL SUPPORT (OUTPATIENT)
Dept: REHABILITATION | Facility: HOSPITAL | Age: 57
End: 2017-01-24
Attending: NURSE PRACTITIONER
Payer: COMMERCIAL

## 2017-01-24 DIAGNOSIS — M25.669 DECREASED RANGE OF MOTION (ROM) OF KNEE: ICD-10-CM

## 2017-01-24 DIAGNOSIS — R26.2 DIFFICULTY WALKING: ICD-10-CM

## 2017-01-24 DIAGNOSIS — R29.898 DECREASED STRENGTH INVOLVING KNEE JOINT: ICD-10-CM

## 2017-01-24 DIAGNOSIS — Z96.653 S/P BILATERAL UNICOMPARTMENTAL KNEE REPLACEMENT: Primary | ICD-10-CM

## 2017-01-24 PROCEDURE — 97161 PT EVAL LOW COMPLEX 20 MIN: CPT | Mod: PN

## 2017-01-24 PROCEDURE — 97110 THERAPEUTIC EXERCISES: CPT | Mod: PN

## 2017-01-24 NOTE — PROGRESS NOTES
Physical Therapy Initial Evaluation     Name: Doug ALLISON [facility] Number: [id number]    Doug ALLISON is a 56 y.o. male evaluated on 01/24/2017.     Diagnosis:   Encounter Diagnoses   Name Primary?    S/P bilateral unicompartmental knee replacement Yes    Decreased strength involving knee joint     Difficulty walking     Decreased range of motion (ROM) of knee      Physician: Alice Tony NP  Treatment Orders: PT Eval and Treat    Past Medical History   Diagnosis Date    Arthritis     Gout     High cholesterol     Hypertension     Hypothyroidism     Thyroid disease      Current Outpatient Prescriptions   Medication Sig    acetaminophen (TYLENOL) 500 MG tablet Take 500 mg by mouth every 6 (six) hours as needed for Pain.    [START ON 2/5/2017] aspirin (ECOTRIN) 81 MG EC tablet Take 1 tablet (81 mg total) by mouth once daily. HOLD UNTIL ASPIRIN 325 MG TWICE DAILY IS COMPLETE; RESUME 2/5/17    aspirin 325 MG tablet Take 1 tablet (325 mg total) by mouth 2 (two) times daily. (End 2/4/17)    ferrous sulfate 325 (65 FE) MG EC tablet Take 1 tablet (325 mg total) by mouth once daily.    levothyroxine (SYNTHROID) 150 MCG tablet Take 150 mcg by mouth once daily.    metoprolol succinate (TOPROL-XL) 100 MG 24 hr tablet Take 1 tablet (100 mg total) by mouth every evening.    nifedipine (ADALAT CC) 60 MG TbSR Take 60 mg by mouth once daily.     oxycodone-acetaminophen (PERCOCET)  mg per tablet Take 1 tablet by mouth 3 (three) times daily as needed for Pain.    polyethylene glycol (GLYCOLAX) 17 gram PwPk Take 17 g by mouth once daily.    senna-docusate 8.6-50 mg (PERICOLACE) 8.6-50 mg per tablet Take 1 tablet by mouth 2 (two) times daily.    simvastatin (ZOCOR) 20 MG tablet TAKE ONE TABLET BY MOUTH IN THE EVENING    vit E-huxphww-ekfe-rutin-hb196 (BIOFLEX) 927-09-23-40 mg Tab Take 1 tablet by mouth once daily. HOLD UNTIL TOLD TO RESUME BY  PHYSICIAN    vitamin D 1000 units Tab Take 1 tablet (1,000 Units total) by mouth once daily.     No current facility-administered medications for this visit.      Review of patient's allergies indicates:  No Known Allergies  Precautions: WBAT B LE    Subjective     Patient States: Pt is s/p B TKA on 1/4/17 by Dr. Escamilla. He was previously receiving inpatient rehab physical therapy, with last treatment on 1/19/17. Home-health PT performed last treatment session 2 days following discharge from inpatient rehab. Pt states he can perform ADL independently, including dressing, bathing, toileting. He is having difficulty with prolonged walking and sit-to-stand transfers. Pt reports that R knee is most painful, however L knee has most weakness. Pt lives in 2-floor house, bed room is upstairs, he is sleeping in bed room; difficulty with quality of stair-climbing. Pt is going upstairs with supervision of wife to decrease risk of falls. Stairs have rail on the left, wall on the right. He is able to go-upstairs however uses significant UE. Pt reports blood pressure is better controlled following surgery, he contributes to decreased pain with ambulation. Pt denies any drainage/odor from surgical incision, night sweats, malaise, chills, fever. Pt denies drop foot in either LE. Pt denies numbness/tingling in feet. Pt denies redness, warmth, pain, swelling in B calf.    Pain Scale: Doug ALLISON rates pain on a scale of 0-10 to be 3 at worst; 1 currently; 1 at best .  Onset: s/p B TKA 1/4/17    Aggravating factors:   Bending B knee to end-range, climbing stairs  Easing factors:  Ice, stretching  Prior Therapy: In-patient rehab and home-health PT  Functional Deficits Leading to Referral: Decreased ability to walk, climb stairs, stand, squat, bend knees  Prior functional status: Independent,  DME owned/used: cane, rolling walker, wheel-chair, bed commode, shower chair  Occupation:  Retired - from coaching/Physical Education teaching  "at [employer]                       Pts goals:  To ascend/descend stairs independently, to improve community-distance ambulation in order to travel, improve prolonged standing    Objective     Posture: R foot ER in static standing  Kalee Sign: Negative bilaterally    Range of Motion: Knee   Left Right   Flexion: 120 120   Extension 0 0     Strength: Knee   Left Right   Quadriceps 4+/5 4/5   Hamstrings 4/5 4/5     Strength: Hip    Left Right   Iliopsoas 4+/5 4/5   PGM 4+/5 4+/5   IR 4/5 4/5   ER 3+/5 3+/5   Ext 3+/5 4/5     Joint Mobility: WNL patellofemoral bilaterally, hypomobile tibiofemoral  Palpation: tight, restricted scar tissue over surgical incision, significant restriction proximal incision  Sensation: intact to light touch    Edema:  Left: minimal  Right: minimal  Girth Measurement   Left Right   6 cm above mid-patella 46 cm 45 cm   Mid-patella 42.5 cm 43 cm   6 cm below mid-patella 38 cm 38 cm     Gait: [place] ambulated with no assistive device.  Level of Assistance: independent  Patient displays  L antalgic gait, with increased weight shift toward R, decreased stance time on L, decreased pelvic drop b    TREATMENT     Time In: 2:30pm  Time Out: 3:30pm    PT Evaluation Completed? Yes  Discussed Plan of Care with patient: Yes    Doug ALLISON received 15 minutes of therapeutic exercise including:   Quad Set 15x 5"  SLR 10x B  Hamstring Str c/ strap 2x30" B  Calf Stretch c/ strap 1x30" B    Doug ALLISON received 0 minutes of manual therapy including:    Written Home Exercises Provided: Yes - QS, SLR, HS Str, Calf Str  Doug ALLISON demonstrated good understanding of the education provided. Patient demonstrated good return demonstration of skill of exercises.    ASSESSMENT     Patient presents to Physical Therapy Evaluation with diagnosis of s/p B TKA, with signs and symptoms including: increased pain, decreased strength, decreased range of motion, decreased endurance, gait abnormality, decreased " balance, joint hypomobility, and decreased tolerance to functional activity. Pt presented with straight cane AD for ambulation, however demonstrates ability to ambulate independently for ~30 feet with no LOB, pain provocation, excessive fatigue. Pt with abnormal dysfunctional gait quality with decreased stance time on L LE, resulting in increased lateral sway towards R LE and pelvic drop, decreased gait speed, decreased stride length. Pt with good quad activation bilaterally, with ability to perform SLR with no extensor lag, and maintenance of terminal knee extension. Pt presents with good active B knee extension/flexion range of motion following surgical procedure, with minimal discomfort provoked at available end-range. Pt with fairly symmetrical girth measurements in B knees, no signs of active infection, poor wound healing, or DVT in either leg. Pt with excellent motivation towards physical activity and responds well to cueing.    Pt prognosis is Good.  Pt will benefit from skilled outpatient physical therapy to address the above stated deficits, provide pt/family education and to maximize pt's level of independence.     Medical necessity is demonstrated by the following IMPAIRMENTS/PROBLEMS:  weakness, impaired endurance, impaired functional mobility, gait instability, impaired balance, decreased lower extremity function, pain, decreased ROM, edema, impaired joint extensibility and impaired muscle length      History  Co-morbidities and personal factors that may impact the plan of care Examination  Body Structures and Functions, activity limitations and participation restrictions that may impact the plan of care Clinical Presentation   Decision Making/ Complexity Score   Co-morbidities:                 Personal Factors: Body Regions:    Body Systems:           Activity limitations: standing, walking, sit-to/from-stand transfers, bed mobility, stair climbing      Participation Restrictions: decreased exercise  for physical fitness, restrict community-distance walking limiting travel opportunities, difficulty preparing meals and household chores, decreased driving         Stable, uncomplicated, predictable   Low complexity    FOTO Knee Survey  Score: 53% Limitation       Pt's spiritual, cultural and educational needs considered and pt agreeable to plan of care and goals as stated below:       Short Term GOALS: 4 weeks. Pt agrees with goals set.  1. Patient demonstrates independence with HEP.   2. Patient demonstrates independence with Postural Awareness.   3. Patient demonstrates increased strength BLE's to 4+/5 or greater to improve tolerance to functional activities pain free.   4. Patient demonstrates ability to ascend/descend 1 flight of stairs, no use of UE on rail, reciprocal step pattern, no symptom provocation, no LOB, to improve efficiency with bed-room access  5. Patient demonstrates ability to independently walk 1/2 mile, on flat surface, proper gait pattern, no symptom provocation to improve tolerance for recreational exercise      Long Term GOALS: 8 weeks. Pt agrees with goals set.  1. Patient demonstrates increased B knee flexion range of motion to 125 degrees to improve tolerance to functional activities pain free.   2. Patient demonstrates increased strength BLE's to 5/5 or greater to improve tolerance to functional activities pain free.   3. Patient demonstrates improved overall function per FOTO Knee Survey to 40% Limitation or less.   4. Patient demonstrates ability to independently walk 1 mile, on flat surface, proper gait pattern, no symptom provocation to improve tolerance to community ambulation while traveling  5. Patient demonstrates no difficulty performing functional squat or stooping to perform yard/garden work, with no symptom provocation, no LOB    Functional Limitations Reports - G Codes  Category: mobility  Tool: FOTO Knee Survey  Score: 53% Limitation   Modifier  Impairment Limitation  Restriction    CH  0 % impaired, limited or restricted    CI  @ least 1% but less than 20% impaired, limited or restricted    CJ  @ least 20%<40% impaired, limited or restricted    CK  @ least 40%<60% impaired, limited or restricted    CL  @ least 60% <80% impaired, limited or restricted    CM  @ least 80%<100% impaired limited or restricted    CN  100% impaired, limited or restricted     Current/  CK = 40-60% Limitation  Goal/ : CJ = 20-40% Limitation     PLAN     Certification Period: 1/24/17 - 4/24/17    Outpatient physical therapy 2-3 times weekly to include: pt ed, hep, therapeutic exercises, neuromuscular re-education/ balance exercises, joint mobilizations, aquatic therapy and modalities prn. Cont PT for  10-12 weeks. Pt may be seen by PTA as part of the rehabilitation team.     Therapist: Kentrell Ponce, PT  1/24/2017      I have seen the patient, reviewed the therapist's plan of care, and I agree with the plan of care.      I certify the need for these services furnished under this plan of treatment and while under my care.     ___________________ ________ Physician/Referring Practitioner            ___________________________ Date of Signature

## 2017-01-24 NOTE — PLAN OF CARE
Physical Therapy Initial Evaluation     Name: Doug ALLISON [facility] Number: [id number]    Doug ALLISON is a 56 y.o. male evaluated on 01/24/2017.     Diagnosis:   Encounter Diagnoses   Name Primary?    S/P bilateral unicompartmental knee replacement Yes    Decreased strength involving knee joint     Difficulty walking     Decreased range of motion (ROM) of knee      Physician: Napoleon Escamilla MD  Treatment Orders: PT Eval and Treat    Precautions: WBAT B LE    Subjective     Patient States: Pt is s/p B TKA on 1/4/17 by Dr. Escamilla. He was previously receiving inpatient rehab physical therapy, with last treatment on 1/19/17. Home-health PT performed last treatment session 2 days following discharge from inpatient rehab. Pt states he can perform ADL independently, including dressing, bathing, toileting. He is having difficulty with prolonged walking and sit-to-stand transfers. Pt reports that R knee is most painful, however L knee has most weakness. Pt lives in 2-floor house, bed room is upstairs, he is sleeping in bed room; difficulty with quality of stair-climbing. Pt is going upstairs with supervision of wife to decrease risk of falls. Stairs have rail on the left, wall on the right. He is able to go-upstairs however uses significant UE. Pt reports blood pressure is better controlled following surgery, he contributes to decreased pain with ambulation. Pt denies any drainage/odor from surgical incision, night sweats, malaise, chills, fever. Pt denies drop foot in either LE. Pt denies numbness/tingling in feet. Pt denies redness, warmth, pain, swelling in B calf.    Pain Scale: Doug ALLISON rates pain on a scale of 0-10 to be 3 at worst; 1 currently; 1 at best .  Onset: s/p B TKA 1/4/17    Aggravating factors:   Bending B knee to end-range, climbing stairs  Easing factors:  Ice, stretching  Prior Therapy: In-patient rehab and home-health PT  Functional  "Deficits Leading to Referral: Decreased ability to walk, climb stairs, stand, squat, bend knees  Prior functional status: Independent,  DME owned/used: cane, rolling walker, wheel-chair, bed commode, shower chair  Occupation:  Retired - from coaching/Physical Education teaching at [employer]                       Pts goals:  To ascend/descend stairs independently, to improve community-distance ambulation in order to travel, improve prolonged standing    Objective     Posture: R foot ER in static standing  Kalee Sign: Negative bilaterally    Range of Motion: Knee   Left Right   Flexion: 120 120   Extension 0 0     Strength: Knee   Left Right   Quadriceps 4+/5 4/5   Hamstrings 4/5 4/5     Strength: Hip    Left Right   Iliopsoas 4+/5 4/5   PGM 4+/5 4+/5   IR 4/5 4/5   ER 3+/5 3+/5   Ext 3+/5 4/5     Joint Mobility: WNL patellofemoral bilaterally, hypomobile tibiofemoral  Palpation: tight, restricted scar tissue over surgical incision, significant restriction proximal incision  Sensation: intact to light touch    Edema:  Left: minimal  Right: minimal  Girth Measurement   Left Right   6 cm above mid-patella 46 cm 45 cm   Mid-patella 42.5 cm 43 cm   6 cm below mid-patella 38 cm 38 cm     Gait: [place] ambulated with no assistive device.  Level of Assistance: independent  Patient displays  L antalgic gait, with increased weight shift toward R, decreased stance time on L, decreased pelvic drop b    TREATMENT     Time In: 2:30pm  Time Out: 3:30pm    PT Evaluation Completed? Yes  Discussed Plan of Care with patient: Yes    Doug ALLISON received 15 minutes of therapeutic exercise including:   Quad Set 15x 5"  SLR 10x B  Hamstring Str c/ strap 2x30" B  Calf Stretch c/ strap 1x30" B    Doug ALLISON received 0 minutes of manual therapy including:    Written Home Exercises Provided: Yes - QS, SLR, HS Str, Calf Str  Doug ALLISON demonstrated good understanding of the education provided. Patient demonstrated good return " demonstration of skill of exercises.    ASSESSMENT     Patient presents to Physical Therapy Evaluation with diagnosis of s/p B TKA, with signs and symptoms including: increased pain, decreased strength, decreased range of motion, decreased endurance, gait abnormality, decreased balance, joint hypomobility, and decreased tolerance to functional activity. Pt presented with straight cane AD for ambulation, however demonstrates ability to ambulate independently for ~30 feet with no LOB, pain provocation, excessive fatigue. Pt with abnormal dysfunctional gait quality with decreased stance time on L LE, resulting in increased lateral sway towards R LE and pelvic drop, decreased gait speed, decreased stride length. Pt with good quad activation bilaterally, with ability to perform SLR with no extensor lag, and maintenance of terminal knee extension. Pt presents with good active B knee extension/flexion range of motion following surgical procedure, with minimal discomfort provoked at available end-range. Pt with fairly symmetrical girth measurements in B knees, no signs of active infection, poor wound healing, or DVT in either leg. Pt with excellent motivation towards physical activity and responds well to cueing.    Pt prognosis is Good.  Pt will benefit from skilled outpatient physical therapy to address the above stated deficits, provide pt/family education and to maximize pt's level of independence.     Medical necessity is demonstrated by the following IMPAIRMENTS/PROBLEMS:  weakness, impaired endurance, impaired functional mobility, gait instability, impaired balance, decreased lower extremity function, pain, decreased ROM, edema, impaired joint extensibility and impaired muscle length      History  Co-morbidities and personal factors that may impact the plan of care Examination  Body Structures and Functions, activity limitations and participation restrictions that may impact the plan of care Clinical Presentation    Decision Making/ Complexity Score   Co-morbidities:                 Personal Factors: Body Regions:    Body Systems:           Activity limitations: standing, walking, sit-to/from-stand transfers, bed mobility, stair climbing      Participation Restrictions: decreased exercise for physical fitness, restrict community-distance walking limiting travel opportunities, difficulty preparing meals and household chores, decreased driving         Stable, uncomplicated, predictable   Low complexity    FOTO Knee Survey  Score: 53% Limitation       Pt's spiritual, cultural and educational needs considered and pt agreeable to plan of care and goals as stated below:       Short Term GOALS: 4 weeks. Pt agrees with goals set.  1. Patient demonstrates independence with HEP.   2. Patient demonstrates independence with Postural Awareness.   3. Patient demonstrates increased strength BLE's to 4+/5 or greater to improve tolerance to functional activities pain free.   4. Patient demonstrates ability to ascend/descend 1 flight of stairs, no use of UE on rail, reciprocal step pattern, no symptom provocation, no LOB, to improve efficiency with bed-room access  5. Patient demonstrates ability to independently walk 1/2 mile, on flat surface, proper gait pattern, no symptom provocation to improve tolerance for recreational exercise      Long Term GOALS: 8 weeks. Pt agrees with goals set.  1. Patient demonstrates increased B knee flexion range of motion to 125 degrees to improve tolerance to functional activities pain free.   2. Patient demonstrates increased strength BLE's to 5/5 or greater to improve tolerance to functional activities pain free.   3. Patient demonstrates improved overall function per FOTO Knee Survey to 40% Limitation or less.   4. Patient demonstrates ability to independently walk 1 mile, on flat surface, proper gait pattern, no symptom provocation to improve tolerance to community ambulation while traveling  5. Patient  demonstrates no difficulty performing functional squat or stooping to perform yard/garden work, with no symptom provocation, no LOB    Functional Limitations Reports - G Codes  Category: mobility  Tool: FOTO Knee Survey  Score: 53% Limitation   Modifier  Impairment Limitation Restriction    CH  0 % impaired, limited or restricted    CI  @ least 1% but less than 20% impaired, limited or restricted    CJ  @ least 20%<40% impaired, limited or restricted    CK  @ least 40%<60% impaired, limited or restricted    CL  @ least 60% <80% impaired, limited or restricted    CM  @ least 80%<100% impaired limited or restricted    CN  100% impaired, limited or restricted     Current/  CK = 40-60% Limitation  Goal/ : CJ = 20-40% Limitation     PLAN     Certification Period: 1/24/17 - 4/24/17    Outpatient physical therapy 2-3 times weekly to include: pt ed, hep, therapeutic exercises, neuromuscular re-education/ balance exercises, joint mobilizations, aquatic therapy and modalities prn. Cont PT for  10-12 weeks. Pt may be seen by PTA as part of the rehabilitation team.     Therapist: Kentrell Ponce, PT  1/24/2017      I have seen the patient, reviewed the therapist's plan of care, and I agree with the plan of care.      I certify the need for these services furnished under this plan of treatment and while under my care.     ___________________ ________ Physician/Referring Practitioner            ___________________________ Date of Signature

## 2017-01-27 ENCOUNTER — CLINICAL SUPPORT (OUTPATIENT)
Dept: REHABILITATION | Facility: HOSPITAL | Age: 57
End: 2017-01-27
Attending: NURSE PRACTITIONER
Payer: COMMERCIAL

## 2017-01-27 ENCOUNTER — LAB VISIT (OUTPATIENT)
Dept: LAB | Facility: HOSPITAL | Age: 57
End: 2017-01-27
Attending: INTERNAL MEDICINE
Payer: COMMERCIAL

## 2017-01-27 DIAGNOSIS — R29.898 DECREASED STRENGTH INVOLVING KNEE JOINT: ICD-10-CM

## 2017-01-27 DIAGNOSIS — M25.669 DECREASED RANGE OF MOTION (ROM) OF KNEE: ICD-10-CM

## 2017-01-27 DIAGNOSIS — N17.9 ACUTE KIDNEY INJURY (NONTRAUMATIC): ICD-10-CM

## 2017-01-27 DIAGNOSIS — Z96.653 S/P BILATERAL UNICOMPARTMENTAL KNEE REPLACEMENT: Primary | ICD-10-CM

## 2017-01-27 DIAGNOSIS — R26.2 DIFFICULTY WALKING: ICD-10-CM

## 2017-01-27 LAB
ALBUMIN SERPL BCP-MCNC: 3 G/DL
ANION GAP SERPL CALC-SCNC: 9 MMOL/L
BUN SERPL-MCNC: 28 MG/DL
CALCIUM SERPL-MCNC: 11.6 MG/DL
CHLORIDE SERPL-SCNC: 103 MMOL/L
CO2 SERPL-SCNC: 25 MMOL/L
CREAT SERPL-MCNC: 2 MG/DL
EST. GFR  (AFRICAN AMERICAN): 41.3 ML/MIN/1.73 M^2
EST. GFR  (NON AFRICAN AMERICAN): 35.7 ML/MIN/1.73 M^2
GLUCOSE SERPL-MCNC: 89 MG/DL
PHOSPHATE SERPL-MCNC: 3.3 MG/DL
POTASSIUM SERPL-SCNC: 4.4 MMOL/L
SODIUM SERPL-SCNC: 137 MMOL/L

## 2017-01-27 PROCEDURE — 97140 MANUAL THERAPY 1/> REGIONS: CPT | Mod: PN

## 2017-01-27 PROCEDURE — 80069 RENAL FUNCTION PANEL: CPT

## 2017-01-27 PROCEDURE — 97110 THERAPEUTIC EXERCISES: CPT | Mod: PN

## 2017-01-27 PROCEDURE — 36415 COLL VENOUS BLD VENIPUNCTURE: CPT | Mod: PO

## 2017-01-27 NOTE — PROGRESS NOTES
"                                                    Physical Therapy Daily Note     Name: Doug ALLISON [facility] Number: [id number]  Diagnosis:   Encounter Diagnoses   Name Primary?    S/P bilateral unicompartmental knee replacement Yes    Difficulty walking     Decreased strength involving knee joint     Decreased range of motion (ROM) of knee      Physician: Alice Tony NP  Precautions: WBAT B LE  Visit #: 2 of 20  ALLEN: 12/31/17    Time In: 3:30pm  Time Out: 4:30pm  Total Treatment Time 1:1: 60 minutes with PT    Subjective     Pt reports: He is slightly sore and feels mild swelling in knees following previous treatment session.  Pain Scale: Doug ALLISON rates pain on a scale of 0-10 to be 2 currently.    Objective     Range of Motion: Knee   Left Right   Flexion: 120 120   Extension 0 0       Doug ALLISON received individual therapeutic exercises to develop strength, endurance, ROM, flexibility, posture and core stabilization for 50 minutes including:    Upright Bike 5 min  Quad Set 2x10 5"  SLR 2x10 B  SAQ 2x10 B  Hamstring Str c/ strap 3x30" B  Calf Stretch c/ strap 2x30" B  Hip Abd c/ GTB 2x10  Hip Add c/ ball 2x10  LAQ 2x10  Step-up 4" 2x10 B  Standing TKE c/ ball 2x10 ea  Mini Wall Squat 2x10  Heel Prop 2# 3 min   Heel Slides c/ slide board 15x B    Doug ALLISON received the following manual therapy techniques: Soft tissue Mobilization and Joint Mobilization were applied to the: B Knees  for 10 minutes including:  Scar Tissue Mobilization   Patella Glides - all directions     Written Home Exercises Provided: Reviewed - QS, SLR, HS Str, Calf Str  Pt demo good understanding of the education provided. Doug ALLISON demonstrated good return demonstration of activities.     PT/PTA face to face conference performed during this session    Assessment     Patient tolerated treatment very well this session. Pt with moderate tightness over proximal and distal ends of surgical incision secondary to restricted " scar tissue; Pt with significantly reduced sensation of tightness, discomfort, and improved tissue mobility following manual techniques. Pt with good performance of weight-bearing activities without utilization of AD or HHA; Pt with good exercise form and no symptom provocation with step-ups to improve tolerance . Pt prepared to continue progressing with strength, endurance, balance, and flexibility training next session.    This is a 56 y.o. male referred to outpatient physical therapy and presents with a medical diagnosis of s/p B TKA and demonstrates limitations as described in the problem list. Pt prognosis is Good. Pt will continue to benefit from skilled outpatient physical therapy to address the deficits listed in the problem list, provide pt/family education and to maximize pt's level of independence in the home and community environment.     Goals as follows:  Short Term GOALS: 4 weeks. Pt agrees with goals set.  1. Patient demonstrates independence with HEP.   2. Patient demonstrates independence with Postural Awareness.   3. Patient demonstrates increased strength BLE's to 4+/5 or greater to improve tolerance to functional activities pain free.   4. Patient demonstrates ability to ascend/descend 1 flight of stairs, no use of UE on rail, reciprocal step pattern, no symptom provocation, no LOB, to improve efficiency with bed-room access  5. Patient demonstrates ability to independently walk 1/2 mile, on flat surface, proper gait pattern, no symptom provocation to improve tolerance for recreational exercise     Plan     Certification Period:    Continue with established Plan of Care towards PT goals.    Therapist: Kentrell Ponce, PT  1/27/2017

## 2017-02-01 ENCOUNTER — TELEPHONE (OUTPATIENT)
Dept: NEPHROLOGY | Facility: CLINIC | Age: 57
End: 2017-02-01

## 2017-02-03 ENCOUNTER — CLINICAL SUPPORT (OUTPATIENT)
Dept: REHABILITATION | Facility: HOSPITAL | Age: 57
End: 2017-02-03
Attending: NURSE PRACTITIONER
Payer: COMMERCIAL

## 2017-02-03 DIAGNOSIS — M25.669 DECREASED RANGE OF MOTION (ROM) OF KNEE: ICD-10-CM

## 2017-02-03 DIAGNOSIS — Z96.653 S/P BILATERAL UNICOMPARTMENTAL KNEE REPLACEMENT: Primary | ICD-10-CM

## 2017-02-03 DIAGNOSIS — R29.898 DECREASED STRENGTH INVOLVING KNEE JOINT: ICD-10-CM

## 2017-02-03 DIAGNOSIS — R26.2 DIFFICULTY WALKING: ICD-10-CM

## 2017-02-03 PROCEDURE — 97110 THERAPEUTIC EXERCISES: CPT | Mod: PN

## 2017-02-03 PROCEDURE — 97140 MANUAL THERAPY 1/> REGIONS: CPT | Mod: PN

## 2017-02-03 NOTE — PROGRESS NOTES
"                                                    Physical Therapy Daily Note     Name: Doug ALLISON [facility] Number: [id number]  Diagnosis:   Encounter Diagnoses   Name Primary?    S/P bilateral unicompartmental knee replacement Yes    Difficulty walking     Decreased strength involving knee joint     Decreased range of motion (ROM) of knee      Physician: Alice Tony NP  Precautions: WBAT B LE  Visit #: 3 of 20  ALLEN: 12/31/17    Time In: 4:00pm  Time Out: 5:00pm  Total Treatment Time 1:1: 60 minutes with PT/tech    Subjective     Pt reports: He experienced discomfort along L lateral foot/ankle when ambulation. He states that B knees are feeling "good", steadily improving function.  Pain Scale: Doug ALLISON rates pain on a scale of 0-10 to be 1 currently.    Objective     Range of Motion: Knee   Left Right   Flexion: 120 120   Extension 0 0       Doug ALLISON received individual therapeutic exercises to develop strength, endurance, ROM, flexibility, posture and core stabilization for 50 minutes including:    Upright Bike 5 min  Calf Stretch on incline board - add next session  Step-up 4" 2x10 B  Standing TKE c/ ball 2x10 ea  Mini Wall Squat 2x10  Quad Set 2x10 5"  SLR 2x10 B  SAQ 2x10 B 2#  Hamstring Str c/ strap 3x30" B  Calf Stretch c/ strap 2x30" B  Hip Abd c/ GTB 2x10  Hip Add c/ ball 2x10  Bridges 2x10  LAQ 2x10 2#  Heel Prop 4# 3 min   Heel Slides c/ slide board 15x B      Doug ALLISON received the following manual therapy techniques: Soft tissue Mobilization and Joint Mobilization were applied to the: B Knees  for 10 minutes including:  Scar Tissue Mobilization; IASTM   Patella Glides - all directions     Written Home Exercises Provided: Reviewed - QS, SLR, HS Str, Calf Str  Pt demo good understanding of the education provided. Doug ALLISON demonstrated good return demonstration of activities.     PT/PTA face to face conference performed during this session    Assessment     Patient tolerated " treatment very well this session. Pt presented with significant soft tissue restriction at distal quad/proximal surgical incision secondary to excess scar tissue in R LE; R LE greater tissue restriction than L LE; Pt with moderate tenderness during IASTM and relief at conclusion of manual technique. Pt is progressing well with range of motion and strength training in B knees, Pt is prepared to progress towards increased functional mobility training next session.    This is a 56 y.o. male referred to outpatient physical therapy and presents with a medical diagnosis of s/p B TKA and demonstrates limitations as described in the problem list. Pt prognosis is Good. Pt will continue to benefit from skilled outpatient physical therapy to address the deficits listed in the problem list, provide pt/family education and to maximize pt's level of independence in the home and community environment.     Goals as follows:  Short Term GOALS: 4 weeks. Pt agrees with goals set.  1. Patient demonstrates independence with HEP.   2. Patient demonstrates independence with Postural Awareness.   3. Patient demonstrates increased strength BLE's to 4+/5 or greater to improve tolerance to functional activities pain free.   4. Patient demonstrates ability to ascend/descend 1 flight of stairs, no use of UE on rail, reciprocal step pattern, no symptom provocation, no LOB, to improve efficiency with bed-room access  5. Patient demonstrates ability to independently walk 1/2 mile, on flat surface, proper gait pattern, no symptom provocation to improve tolerance for recreational exercise     Plan     Certification Period: 1/24/17 - 4/24/17    Continue with established Plan of Care towards PT goals.    Therapist: Kentrell Ponce, PT  2/3/2017

## 2017-02-07 ENCOUNTER — CLINICAL SUPPORT (OUTPATIENT)
Dept: REHABILITATION | Facility: HOSPITAL | Age: 57
End: 2017-02-07
Attending: NURSE PRACTITIONER
Payer: COMMERCIAL

## 2017-02-07 DIAGNOSIS — R26.2 DIFFICULTY WALKING: ICD-10-CM

## 2017-02-07 DIAGNOSIS — M25.669 DECREASED RANGE OF MOTION (ROM) OF KNEE: ICD-10-CM

## 2017-02-07 DIAGNOSIS — R29.898 DECREASED STRENGTH INVOLVING KNEE JOINT: ICD-10-CM

## 2017-02-07 DIAGNOSIS — Z96.653 S/P BILATERAL UNICOMPARTMENTAL KNEE REPLACEMENT: Primary | ICD-10-CM

## 2017-02-07 PROCEDURE — 97110 THERAPEUTIC EXERCISES: CPT | Mod: PN

## 2017-02-07 PROCEDURE — 97140 MANUAL THERAPY 1/> REGIONS: CPT | Mod: PN

## 2017-02-07 NOTE — PROGRESS NOTES
"                                                    Physical Therapy Daily Note     Name: Doug ALLISON [facility] Number: [id number]  Diagnosis:   Encounter Diagnoses   Name Primary?    S/P bilateral unicompartmental knee replacement Yes    Difficulty walking     Decreased strength involving knee joint     Decreased range of motion (ROM) of knee      Physician: Alice Tony NP  Precautions: WBAT B LE  Visit #: 4 of 20  ALLEN: 12/31/17    Time In: 1:10pm  Time Out: 2:10pm  Total Treatment Time 1:1: 60 minutes with PT/tech    DOS: 1/4/17    Subjective     Pt reports: He reports decreased soft tissue restriction over surgical incision since previous treatment session. He was able to ascend/descend stairs at home with minimal difficulty .  Pain Scale: Doug ALLISON rates pain on a scale of 0-10 to be 1 currently.    Objective     Range of Motion: Knee   Left Right   Flexion: 128 125   Extension 0 0       Doug ALLISON received individual therapeutic exercises to develop strength, endurance, ROM, flexibility, posture and core stabilization for 50 minutes including:    Upright Bike 5 min  Calf Stretch on incline board 3x30"  Step-up 4" 2x10 B  Standing TKE c/ ball 2x10 ea  Mini Wall Squat 2x10  LAQ 2x10 2#    Hamstring Str c/ strap 3x30" B  SLR 2x10 B  Hip SLR Abd/Add/Ext 2x10 ea  SAQ 2x10 B 2#  Heel Prop 4# 3 min   Heel Slides c/ slide board 15x B  Bridges 2x10  Quad Set 2x10 5"    Doug ALLISON received the following manual therapy techniques: Soft tissue Mobilization and Joint Mobilization were applied to the: B Knees  for 10 minutes including:  Scar Tissue Mobilization; IASTM   Patella Glides - all directions     Written Home Exercises Provided: Yes - SLR, Hip Abd, Hip Ext  Pt demo good understanding of the education provided. Doug ALLISON demonstrated good return demonstration of activities.     PT/PTA face to face conference performed during this session    Assessment     Patient tolerated treatment very well this " session. Pt progressing well with normalizing B knee strength and range of motion this session. Pt with good performance on introduction of leg press activity, with noticeable muscular fatigue displayed at conclusion of activity. Pt with good tolerance to IASTM to B Knee to normalize scar tissue formation over surgical incision; small skin abrasion over incision with bandage applied, no pain or abnormal drainage noted.    This is a 56 y.o. male referred to outpatient physical therapy and presents with a medical diagnosis of s/p B TKA and demonstrates limitations as described in the problem list. Pt prognosis is Good. Pt will continue to benefit from skilled outpatient physical therapy to address the deficits listed in the problem list, provide pt/family education and to maximize pt's level of independence in the home and community environment.     Goals as follows:  Short Term GOALS: 4 weeks. Pt agrees with goals set.  1. Patient demonstrates independence with HEP.   2. Patient demonstrates independence with Postural Awareness.   3. Patient demonstrates increased strength BLE's to 4+/5 or greater to improve tolerance to functional activities pain free.   4. Patient demonstrates ability to ascend/descend 1 flight of stairs, no use of UE on rail, reciprocal step pattern, no symptom provocation, no LOB, to improve efficiency with bed-room access  5. Patient demonstrates ability to independently walk 1/2 mile, on flat surface, proper gait pattern, no symptom provocation to improve tolerance for recreational exercise     Plan     Certification Period: 1/24/17 - 4/24/17    Continue with established Plan of Care towards PT goals.    Therapist: Kentrell Ponce, PT  2/7/2017

## 2017-02-14 ENCOUNTER — CLINICAL SUPPORT (OUTPATIENT)
Dept: REHABILITATION | Facility: HOSPITAL | Age: 57
End: 2017-02-14
Attending: NURSE PRACTITIONER
Payer: COMMERCIAL

## 2017-02-14 DIAGNOSIS — R29.898 DECREASED STRENGTH INVOLVING KNEE JOINT: ICD-10-CM

## 2017-02-14 DIAGNOSIS — R26.2 DIFFICULTY WALKING: ICD-10-CM

## 2017-02-14 DIAGNOSIS — Z96.653 S/P BILATERAL UNICOMPARTMENTAL KNEE REPLACEMENT: Primary | ICD-10-CM

## 2017-02-14 DIAGNOSIS — M25.669 DECREASED RANGE OF MOTION (ROM) OF KNEE: ICD-10-CM

## 2017-02-14 PROCEDURE — 97110 THERAPEUTIC EXERCISES: CPT | Mod: PN

## 2017-02-14 NOTE — PROGRESS NOTES
"                                                    Physical Therapy Daily Note     Name: Doug ALLISON [facility] Number: [id number]  Diagnosis:   Encounter Diagnoses   Name Primary?    S/P bilateral unicompartmental knee replacement Yes    Difficulty walking     Decreased strength involving knee joint     Decreased range of motion (ROM) of knee      Physician: Alice Tony NP  Precautions: WBAT B LE  Visit #: 5 of 20  ALLEN: 12/31/17    Time In: 1:00pm  Time Out: 2:00pm  Total Treatment Time 1:1: 60 minutes with PT/tech    DOS: 1/4/17    Subjective     Pt reports: He states he cancelled previously scheduled treatment session due to excessive pain in lateral aspect of R knee.  Pain Scale: Doug ALLISON rates pain on a scale of 0-10 to be 0 currently.    Objective     Range of Motion: Knee   Left Right   Flexion: 135 AAROM 130 AAROM   Extension 0 0       Doug ALLISON received individual therapeutic exercises to develop strength, endurance, ROM, flexibility, posture and core stabilization for 50 minutes including:    Upright Bike 5 min  Calf Stretch on incline board 3x30"  Step-up 6" 2x10 B  Standing TKE c/ ball 2x10 ea  Mini Wall Squat 2x10  LAQ 2x10 5#  Leg Press 3x10 55#  Tandem Stance 2x30" each LE    SLR 2x10 B 2#  Hip SLR Abd/Add/Ext 2x10 ea 2#  Heel Prop 4# 3 min   Heel Slides c/ slide board 15x B  Bridges 2x10  Quad Set 2x10 5"    Doug ALLISON received the following manual therapy techniques: Soft tissue Mobilization and Joint Mobilization were applied to the: B Knees  for 10 minutes including:  Scar Tissue Mobilization; IASTM   Patella Glides - all directions     Written Home Exercises Provided: Yes - SLR, Hip Abd, Hip Ext  Pt demo good understanding of the education provided. Doug ALLISON demonstrated good return demonstration of activities.     PT/PTA face to face conference performed during this session    Assessment     Patient tolerated treatment very well this session. Pt has made good progression " towards normalized B knee ROM in both extension and flexion. Pt with good performance on leg press activity, with appropriate foot placement and strength for smooth movement pattern. Pt with good performance on static standing balance activities and is prepared to progress to single-limb and balance activities on variable surface next session.     This is a 56 y.o. male referred to outpatient physical therapy and presents with a medical diagnosis of s/p B TKA and demonstrates limitations as described in the problem list. Pt prognosis is Good. Pt will continue to benefit from skilled outpatient physical therapy to address the deficits listed in the problem list, provide pt/family education and to maximize pt's level of independence in the home and community environment.     Goals as follows:  Short Term GOALS: 4 weeks. Pt agrees with goals set.  1. Patient demonstrates independence with HEP.   2. Patient demonstrates independence with Postural Awareness.   3. Patient demonstrates increased strength BLE's to 4+/5 or greater to improve tolerance to functional activities pain free.   4. Patient demonstrates ability to ascend/descend 1 flight of stairs, no use of UE on rail, reciprocal step pattern, no symptom provocation, no LOB, to improve efficiency with bed-room access  5. Patient demonstrates ability to independently walk 1/2 mile, on flat surface, proper gait pattern, no symptom provocation to improve tolerance for recreational exercise     Plan     Certification Period: 1/24/17 - 4/24/17    Continue with established Plan of Care towards PT goals.    Therapist: Kentrell Ponce, PT  2/14/2017

## 2017-02-16 ENCOUNTER — OFFICE VISIT (OUTPATIENT)
Dept: ORTHOPEDICS | Facility: CLINIC | Age: 57
End: 2017-02-16
Payer: COMMERCIAL

## 2017-02-16 ENCOUNTER — HOSPITAL ENCOUNTER (OUTPATIENT)
Dept: RADIOLOGY | Facility: HOSPITAL | Age: 57
Discharge: HOME | End: 2017-02-16
Attending: ORTHOPAEDIC SURGERY
Payer: COMMERCIAL

## 2017-02-16 VITALS
HEIGHT: 70 IN | DIASTOLIC BLOOD PRESSURE: 86 MMHG | WEIGHT: 235 LBS | HEART RATE: 86 BPM | BODY MASS INDEX: 33.64 KG/M2 | SYSTOLIC BLOOD PRESSURE: 145 MMHG | RESPIRATION RATE: 18 BRPM

## 2017-02-16 DIAGNOSIS — M25.561 PAIN IN BOTH KNEES, UNSPECIFIED CHRONICITY: ICD-10-CM

## 2017-02-16 DIAGNOSIS — M25.562 PAIN IN BOTH KNEES, UNSPECIFIED CHRONICITY: ICD-10-CM

## 2017-02-16 DIAGNOSIS — M25.562 PAIN IN BOTH KNEES, UNSPECIFIED CHRONICITY: Primary | ICD-10-CM

## 2017-02-16 DIAGNOSIS — M25.561 PAIN IN BOTH KNEES, UNSPECIFIED CHRONICITY: Primary | ICD-10-CM

## 2017-02-16 PROCEDURE — 73560 X-RAY EXAM OF KNEE 1 OR 2: CPT | Mod: 26,50,, | Performed by: RADIOLOGY

## 2017-02-16 PROCEDURE — 99999 PR PBB SHADOW E&M-EST. PATIENT-LVL III: CPT | Mod: PBBFAC,,, | Performed by: ORTHOPAEDIC SURGERY

## 2017-02-16 PROCEDURE — 73560 X-RAY EXAM OF KNEE 1 OR 2: CPT | Mod: 50,TC

## 2017-02-16 PROCEDURE — 99024 POSTOP FOLLOW-UP VISIT: CPT | Mod: S$GLB,,, | Performed by: ORTHOPAEDIC SURGERY

## 2017-02-16 NOTE — MR AVS SNAPSHOT
[facility]  [address]  [facility] [address]  Phone: [phone]                  Doug Garibay   2017 9:30 AM   Office Visit    Description:  Male : 1960   Provider:  Napoleon Escamilla MD   Department:  [facility]           Reason for Visit     Follow-up           Diagnoses this Visit        Comments    Pain in both knees, unspecified chronicity    -  Primary            To Do List           Future Appointments        Provider Department Dept Phone    2017 1:00 PM Kentrell Ponce PT [facility] [phone]    3/30/2017 9:30 AM Napoleon Escamilla MD [facility] [phone]      Goals (5 Years of Data)     None      [facility]     [facility] Care [facility] -  Assistance  Registered nurses in the [facility] provide clinical advisement, health education, appointment booking, and other advisory services.  Call for this free service at [phone].             Medications           Message regarding Medications     Verify the changes and/or additions to your medication regime listed below are the same as discussed with your clinician today.  If any of these changes or additions are incorrect, please notify your healthcare provider.             Verify that the below list of medications is an accurate representation of the medications you are currently taking.  If none reported, the list may be blank. If incorrect, please contact your healthcare provider. Carry this list with you in case of emergency.           Current Medications     acetaminophen (TYLENOL) 500 MG tablet Take 500 mg by mouth every 6 (six) hours as needed for Pain.    aspirin (ECOTRIN) 81 MG EC tablet Take 1 tablet (81 mg total) by mouth once daily. HOLD UNTIL ASPIRIN 325 MG TWICE DAILY IS COMPLETE; RESUME 17    ferrous sulfate 325 (65 FE) MG EC tablet Take 1 tablet (325 mg total) by mouth once daily.    levothyroxine  "(SYNTHROID) 150 MCG tablet Take 150 mcg by mouth once daily.    metoprolol succinate (TOPROL-XL) 100 MG 24 hr tablet Take 1 tablet (100 mg total) by mouth every evening.    nifedipine (ADALAT CC) 60 MG TbSR Take 60 mg by mouth once daily.     oxycodone-acetaminophen (PERCOCET)  mg per tablet Take 1 tablet by mouth 3 (three) times daily as needed for Pain.    polyethylene glycol (GLYCOLAX) 17 gram PwPk Take 17 g by mouth once daily.    senna-docusate 8.6-50 mg (PERICOLACE) 8.6-50 mg per tablet Take 1 tablet by mouth 2 (two) times daily.    simvastatin (ZOCOR) 20 MG tablet TAKE ONE TABLET BY MOUTH IN THE EVENING    vit P-sjllqap-wzyk-rutin-hb196 (BIOFLEX) 582-26-35-40 mg Tab Take 1 tablet by mouth once daily. HOLD UNTIL TOLD TO RESUME BY PHYSICIAN    vitamin D 1000 units Tab Take 1 tablet (1,000 Units total) by mouth once daily.           Clinical Reference Information           Your Vitals Were     BP Pulse Resp Height Weight BMI    145/86 86 18 5' 10" (1.778 m) 106.6 kg (235 lb 0.2 oz) 33.72 kg/m2      Blood Pressure          Most Recent Value    BP  (!)  145/86      Allergies as of 2/16/2017     No Known Allergies      Immunizations Administered on Date of Encounter - 2/16/2017     None      Orders Placed During Today's Visit     Future Labs/Procedures Expected by Expires    X-Ray Knee 1 or 2 View Bilateral  2/16/2017 2/16/2018      [facility] Sign-Up     Activating your [facility] account is as easy as 1-2-3!     1) Visit [email or web address], select Sign Up Now, enter this activation code and your date of birth, then select Next.  [email or web address]  Expires: 4/2/2017  9:59 AM      2) Create a username and password to use when you visit [facility] in the future and select a security question in case you lose your password and select Next.    3) Enter your e-mail address and click Sign Up!    Additional Information  If you have questions, please e-mail [email or web address] or call [phone] to talk to our " [facility] staff. Remember, [facility] is NOT to be used for urgent needs. For medical emergencies, dial 911.         Language Assistance Services     ATTENTION: Language assistance services are available, free of charge. Please call [phone].      ATENCIÓN: Si habla bhumikaañol, tiene a figueroa disposición servicios gratuitos de asistencia lingüística. Llame al [phone].     CHÚ Ý: N?u b?n nói Ti?ng Vi?t, có các d?ch v? h? tr? ngôn ng? mi?n phí dành cho b?n. G?i s? [phone].         [facility] complies with applicable Federal civil rights laws and does not discriminate on the basis of race, color, national origin, age, disability, or sex.

## 2017-02-16 NOTE — PROGRESS NOTES
Doug ALLISON [place] presents for post-operative evaluation.  He is status-post  bilateral knee arthroplasty.  The wound is healing well with no signs of erythema or warmth.  There is no drainage.  No clinical signs or symptoms of infection are present.  All staples were removed today.  ROM 0-130.    Post-operative radiographs were obtained today and show satisfactory position of the prosthesis.    We will continue post-operative physical therapy.    Follow-up in 6 weeks.

## 2017-02-17 ENCOUNTER — CLINICAL SUPPORT (OUTPATIENT)
Dept: REHABILITATION | Facility: HOSPITAL | Age: 57
End: 2017-02-17
Attending: NURSE PRACTITIONER
Payer: COMMERCIAL

## 2017-02-17 DIAGNOSIS — R26.2 DIFFICULTY WALKING: ICD-10-CM

## 2017-02-17 DIAGNOSIS — M25.669 DECREASED RANGE OF MOTION (ROM) OF KNEE: ICD-10-CM

## 2017-02-17 DIAGNOSIS — Z96.653 S/P BILATERAL UNICOMPARTMENTAL KNEE REPLACEMENT: Primary | ICD-10-CM

## 2017-02-17 DIAGNOSIS — R29.898 DECREASED STRENGTH INVOLVING KNEE JOINT: ICD-10-CM

## 2017-02-17 PROCEDURE — 97140 MANUAL THERAPY 1/> REGIONS: CPT | Mod: PN

## 2017-02-17 PROCEDURE — 97110 THERAPEUTIC EXERCISES: CPT | Mod: PN

## 2017-02-17 NOTE — PROGRESS NOTES
"                                                    Physical Therapy Daily Note     Name: Doug ALLISON [facility] Number: [id number]  Diagnosis:   Encounter Diagnoses   Name Primary?    S/P bilateral unicompartmental knee replacement Yes    Difficulty walking     Decreased strength involving knee joint     Decreased range of motion (ROM) of knee      Physician: Alice Tony NP  Precautions: WBAT B LE  Visit #: 6 of 20  ALLEN: 12/31/17    Time In: 1:00pm  Time Out: 2:00pm  Total Treatment Time 1:1: 60 minutes with PT/tech    DOS: 1/4/17    Subjective     Pt reports: He has mild tightness/soreness following increased walking yesterday. Pt with MD visit yesterday, continue PT as planned.  Pain Scale: Doug ALLISON rates pain on a scale of 0-10 to be 0 currently.    Objective     Range of Motion: Knee   Left Right   Flexion: 135 AAROM 130 AAROM   Extension 0 0       Doug ALLISON received individual therapeutic exercises to develop strength, endurance, ROM, flexibility, posture and core stabilization for 50 minutes including:    Upright Bike 5 min  Calf Stretch on incline board 3x30"  Step-up 6" 2x10 B  Lateral Step-up 6" 15x ea LE  Lateral Step-down 2" c/ ski pole 2x10 ea LE  Standing TKE c/ pink cord 2x10 ea    Mini Wall Squat c/ physioball 2x10  Knee Ext Matrix - Add next session  Knee Flex Matrix - Add next session  Leg Press 3x10 55#  Tandem Stance 2x30" each LE    B Hamstring Str 2x30"   SLR 3x10 B 3#  Hip SLR Abd/Add/Ext 2x10 ea 3#  Heel Prop 5# 3 min   Heel Slides c/ slide board 15x B  Bridges 2x10  Quad Set 2x10 5"    Doug ALLISON received the following manual therapy techniques: Soft tissue Mobilization and Joint Mobilization were applied to the: B Knees  for 10 minutes including:  Scar Tissue Mobilization; IASTM   Patella Glides - all directions     Written Home Exercises Provided: Yes - SLR, Hip Abd, Hip Ext  Pt demo good understanding of the education provided. Doug ALLISON demonstrated good return " demonstration of activities.     PT/PTA face to face conference performed during this session    Assessment     Patient tolerated treatment very well this session. Pt with fair performance during progression of strength training for B LE this session, with decreased eccentric quad control when performing step down, with greater deficit in R LE compared to L LE. Pt with reports of overall fatigue and malaise, with requests to limit intensity of therapeutic exercise; Pt reports symptoms related to having minimal food intake prior to session. Pt denied dizziness, nauseous, headache symptoms; rest break provided; performance of seated/supine exercises for remainder of session.    This is a 56 y.o. male referred to outpatient physical therapy and presents with a medical diagnosis of s/p B TKA and demonstrates limitations as described in the problem list. Pt prognosis is Good. Pt will continue to benefit from skilled outpatient physical therapy to address the deficits listed in the problem list, provide pt/family education and to maximize pt's level of independence in the home and community environment.     Goals as follows:  Short Term GOALS: 4 weeks. Pt agrees with goals set.  1. Patient demonstrates independence with HEP.   2. Patient demonstrates independence with Postural Awareness.   3. Patient demonstrates increased strength BLE's to 4+/5 or greater to improve tolerance to functional activities pain free.   4. Patient demonstrates ability to ascend/descend 1 flight of stairs, no use of UE on rail, reciprocal step pattern, no symptom provocation, no LOB, to improve efficiency with bed-room access  5. Patient demonstrates ability to independently walk 1/2 mile, on flat surface, proper gait pattern, no symptom provocation to improve tolerance for recreational exercise     Plan     Certification Period: 1/24/17 - 4/24/17    Continue with established Plan of Care towards PT goals.    Therapist: Kentrell Ponce,  PT  2/17/2017

## 2017-02-21 ENCOUNTER — CLINICAL SUPPORT (OUTPATIENT)
Dept: REHABILITATION | Facility: HOSPITAL | Age: 57
End: 2017-02-21
Attending: NURSE PRACTITIONER
Payer: COMMERCIAL

## 2017-02-21 DIAGNOSIS — M25.669 DECREASED RANGE OF MOTION (ROM) OF KNEE: ICD-10-CM

## 2017-02-21 DIAGNOSIS — Z96.653 S/P BILATERAL UNICOMPARTMENTAL KNEE REPLACEMENT: Primary | ICD-10-CM

## 2017-02-21 DIAGNOSIS — R29.898 DECREASED STRENGTH INVOLVING KNEE JOINT: ICD-10-CM

## 2017-02-21 DIAGNOSIS — R26.2 DIFFICULTY WALKING: ICD-10-CM

## 2017-02-21 PROCEDURE — 97110 THERAPEUTIC EXERCISES: CPT | Mod: PN

## 2017-02-21 PROCEDURE — 97112 NEUROMUSCULAR REEDUCATION: CPT | Mod: PN

## 2017-02-21 NOTE — PROGRESS NOTES
"                                                    Physical Therapy Daily Note     Name: Doug ALLISON [facility] Number: [id number]  Diagnosis:   Encounter Diagnoses   Name Primary?    S/P bilateral unicompartmental knee replacement Yes    Decreased strength involving knee joint     Difficulty walking     Decreased range of motion (ROM) of knee      Physician: Alice Tony NP  Precautions: WBAT B LE  Visit #: 7 of 20  ALLEN: 12/31/17    Time In: 1100  Time Out: 1200  Total Treatment Time 1:1: 60 minutes with PT/tech    DOS: 1/4/17    Subjective     Pt reports: He is feeling mild soreness in R knee, reports he has continued to perform increased walking  Pain Scale: Doug ALLISON rates pain on a scale of 0-10 to be 1 currently.    Objective     Range of Motion: Knee   Left Right   Flexion: 135 AAROM 130 AAROM   Extension 0 0       Doug ALLISON received individual therapeutic exercises to develop strength, endurance, ROM, flexibility, posture and core stabilization for 50 minutes including:    Upright Bike 5 min  Calf Stretch on incline board 3x30"  Step-up 6" 2x10 B  Lateral Step-up 6" 15x ea LE  Lateral Step-down 2" c/ ski pole 2x10 ea LE  Standing TKE c/ pink cord 2x10 ea    Mini Wall Squat c/ physioball 2x10  Knee Ext Matrix 2x15 15#  Knee Flex Matrix  2x15 15#  Leg Press 3x10 55#    B Hamstring Str 2x30"   SLR 2x10 B 5#  Hip SLR Abd/Add/Ext 2x10 ea 3#  Heel Prop 5# 3 min   Heel Slides c/ slide board 15x B  Bridges 2x10  Quad Set 2x10 5"    Doug ALLISON received the following neuromuscular re-education: balance, coordination, and motor control activities were performed for 5 minutes including:  Tandem Stance 2x30" each LE   Tandem Stance 2x30" each LE c/ airex foam  Single-limb stance 2x30" ea LE        Doug ALLISON received the following manual therapy techniques: Soft tissue Mobilization and Joint Mobilization were applied to the: B Knees  for 5 minutes including:  Scar Tissue Mobilization; IASTM   Patella " Glides - all directions     Written Home Exercises Provided: Yes - SLR, Hip Abd, Hip Ext  Pt demo good understanding of the education provided. Doug ALLISON demonstrated good return demonstration of activities.     PT/PTA face to face conference performed during this session    Assessment     Patient tolerated treatment very well this session. Pt with good progression during static standing balance on variable surface this session, with decreasing HHA required for stabilization as activity progressed. Pt with good introduction towards open chain resistance training on Matrix machines, with Pt prepared to increase resistance next session.    This is a 56 y.o. male referred to outpatient physical therapy and presents with a medical diagnosis of s/p B TKA and demonstrates limitations as described in the problem list. Pt prognosis is Good. Pt will continue to benefit from skilled outpatient physical therapy to address the deficits listed in the problem list, provide pt/family education and to maximize pt's level of independence in the home and community environment.     Goals as follows:  Short Term GOALS: 4 weeks. Pt agrees with goals set.  1. Patient demonstrates independence with HEP.   2. Patient demonstrates independence with Postural Awareness.   3. Patient demonstrates increased strength BLE's to 4+/5 or greater to improve tolerance to functional activities pain free.   4. Patient demonstrates ability to ascend/descend 1 flight of stairs, no use of UE on rail, reciprocal step pattern, no symptom provocation, no LOB, to improve efficiency with bed-room access  5. Patient demonstrates ability to independently walk 1/2 mile, on flat surface, proper gait pattern, no symptom provocation to improve tolerance for recreational exercise     Plan     Certification Period: 1/24/17 - 4/24/17    Continue with established Plan of Care towards PT goals.    Therapist: Kentrell Ponce, PT  2/21/2017

## 2017-02-23 ENCOUNTER — CLINICAL SUPPORT (OUTPATIENT)
Dept: REHABILITATION | Facility: HOSPITAL | Age: 57
End: 2017-02-23
Attending: NURSE PRACTITIONER
Payer: COMMERCIAL

## 2017-02-23 DIAGNOSIS — R26.2 DIFFICULTY WALKING: ICD-10-CM

## 2017-02-23 DIAGNOSIS — R29.898 DECREASED STRENGTH INVOLVING KNEE JOINT: ICD-10-CM

## 2017-02-23 DIAGNOSIS — Z96.653 S/P BILATERAL UNICOMPARTMENTAL KNEE REPLACEMENT: Primary | ICD-10-CM

## 2017-02-23 DIAGNOSIS — M25.669 DECREASED RANGE OF MOTION (ROM) OF KNEE: ICD-10-CM

## 2017-02-23 PROCEDURE — 97110 THERAPEUTIC EXERCISES: CPT | Mod: PN

## 2017-02-23 NOTE — PROGRESS NOTES
"                                                    Physical Therapy Daily Note     Name: Doug ALLISON [facility] Number: [id number]  Diagnosis:   Encounter Diagnoses   Name Primary?    S/P bilateral unicompartmental knee replacement Yes    Decreased strength involving knee joint     Difficulty walking     Decreased range of motion (ROM) of knee      Physician: Alice Tony NP  Precautions: WBAT B LE  Visit #: 8 of 20  ALLEN: 12/31/17    Time In: 830  Time Out: 930  Total Treatment Time 1:1: 60 minutes with PT/tech    DOS: 1/4/17    Subjective     Pt reports: He is continuing to perform HEP consistently. He has recently purchased stationary bike for home use with good results.  Pain Scale: Doug ALLISON rates pain on a scale of 0-10 to be 1 currently.    Objective     Range of Motion: Knee   Left Right   Flexion: 135 AAROM 130 AAROM   Extension 0 0       Doug ALLISON received individual therapeutic exercises to develop strength, endurance, ROM, flexibility, posture and core stabilization for 50 minutes including:    Upright Bike 5 min  Calf Stretch on incline board 3x30"  Step-up 6" 2x10 B  Lateral Step-up 6" 15x ea LE  Lateral Step-down 2" c/ ski pole 2x10 ea LE  Standing TKE c/ pink cord 2x10 ea    Mini Wall Squat c/ physioball 2x10  Knee Ext Matrix 2x15 15#  Knee Flex Matrix  2x15 15#  Leg Press 3x10 55#    B Hamstring Str 2x30"   SLR 2x10 B 5#  Standing Hip Abd/Ext c/ YTB 2x10  Hip SLR Abd/Add/Ext 2x10 ea 3#  Heel Prop 5# 3 min    Heel Slides c/ slide board 15x B  Bridges 2x10  Quad Set 2x10 5"    Doug ALLISON received the following neuromuscular re-education: balance, coordination, and motor control activities were performed for 5 minutes including:  Tandem Stance 2x30" each LE   Tandem Stance 2x30" each LE c/ airex foam  Single-limb stance 2x30" ea LE    Doug ALLISON received the following manual therapy techniques: Soft tissue Mobilization and Joint Mobilization were applied to the: B Knees  for 5 minutes " including:  Scar Tissue Mobilization; IASTM   Patella Glides - all directions     Written Home Exercises Provided: Yes - SLR, Hip Abd, Hip Ext  Pt demo good understanding of the education provided. Doug ALLISON demonstrated good return demonstration of activities.     PT/PTA face to face conference performed during this session    Assessment     Patient tolerated treatment very well this session. Pt performed well on resistance training on Matrix machines, with good tolerance towards increased resistance without adverse effect. Pt with ability to progress towards increased step height for lateral step down activity due to progressing eccentric quad strength and motor control. Pt with improved soft tissue extensibility over R surgical incision this session.    This is a 56 y.o. male referred to outpatient physical therapy and presents with a medical diagnosis of s/p B TKA and demonstrates limitations as described in the problem list. Pt prognosis is Good. Pt will continue to benefit from skilled outpatient physical therapy to address the deficits listed in the problem list, provide pt/family education and to maximize pt's level of independence in the home and community environment.     Goals as follows:  Short Term GOALS: 4 weeks. Pt agrees with goals set.  1. Patient demonstrates independence with HEP.   2. Patient demonstrates independence with Postural Awareness.   3. Patient demonstrates increased strength BLE's to 4+/5 or greater to improve tolerance to functional activities pain free.   4. Patient demonstrates ability to ascend/descend 1 flight of stairs, no use of UE on rail, reciprocal step pattern, no symptom provocation, no LOB, to improve efficiency with bed-room access  5. Patient demonstrates ability to independently walk 1/2 mile, on flat surface, proper gait pattern, no symptom provocation to improve tolerance for recreational exercise     Plan     Certification Period: 1/24/17 - 4/24/17    Continue  with established Plan of Care towards PT goals.    Therapist: Kentrell Ponce, PT  2/23/2017

## 2017-03-07 ENCOUNTER — OFFICE VISIT (OUTPATIENT)
Dept: FAMILY MEDICINE | Facility: CLINIC | Age: 57
End: 2017-03-07
Payer: COMMERCIAL

## 2017-03-07 VITALS
HEIGHT: 70 IN | TEMPERATURE: 99 F | HEART RATE: 85 BPM | WEIGHT: 220.56 LBS | SYSTOLIC BLOOD PRESSURE: 135 MMHG | DIASTOLIC BLOOD PRESSURE: 83 MMHG | OXYGEN SATURATION: 99 % | BODY MASS INDEX: 31.57 KG/M2

## 2017-03-07 DIAGNOSIS — M10.9 ACUTE GOUT OF LEFT FOOT, UNSPECIFIED CAUSE: Primary | ICD-10-CM

## 2017-03-07 DIAGNOSIS — N18.30 CKD (CHRONIC KIDNEY DISEASE) STAGE 3, GFR 30-59 ML/MIN: ICD-10-CM

## 2017-03-07 DIAGNOSIS — M25.50 ARTHRALGIA, UNSPECIFIED JOINT: ICD-10-CM

## 2017-03-07 DIAGNOSIS — M10.9 ACUTE GOUT INVOLVING TOE OF RIGHT FOOT, UNSPECIFIED CAUSE: ICD-10-CM

## 2017-03-07 PROCEDURE — 3075F SYST BP GE 130 - 139MM HG: CPT | Mod: S$GLB,,, | Performed by: NURSE PRACTITIONER

## 2017-03-07 PROCEDURE — 3079F DIAST BP 80-89 MM HG: CPT | Mod: S$GLB,,, | Performed by: NURSE PRACTITIONER

## 2017-03-07 PROCEDURE — 99999 PR PBB SHADOW E&M-EST. PATIENT-LVL IV: CPT | Mod: PBBFAC,,, | Performed by: NURSE PRACTITIONER

## 2017-03-07 PROCEDURE — 99214 OFFICE O/P EST MOD 30 MIN: CPT | Mod: 25,S$GLB,, | Performed by: NURSE PRACTITIONER

## 2017-03-07 PROCEDURE — 1160F RVW MEDS BY RX/DR IN RCRD: CPT | Mod: S$GLB,,, | Performed by: NURSE PRACTITIONER

## 2017-03-07 PROCEDURE — 96372 THER/PROPH/DIAG INJ SC/IM: CPT | Mod: S$GLB,,, | Performed by: NURSE PRACTITIONER

## 2017-03-07 RX ORDER — TRIAMCINOLONE ACETONIDE 40 MG/ML
40 INJECTION, SUSPENSION INTRA-ARTICULAR; INTRAMUSCULAR
Status: COMPLETED | OUTPATIENT
Start: 2017-03-07 | End: 2017-03-07

## 2017-03-07 RX ORDER — PREDNISONE 20 MG/1
TABLET ORAL
Qty: 21 TABLET | Refills: 0 | Status: SHIPPED | OUTPATIENT
Start: 2017-03-07

## 2017-03-07 RX ADMIN — TRIAMCINOLONE ACETONIDE 40 MG: 40 INJECTION, SUSPENSION INTRA-ARTICULAR; INTRAMUSCULAR at 12:03

## 2017-03-07 NOTE — PATIENT INSTRUCTIONS
Gout    Gout is an inflammation of a joint due to a build-up of gout crystals in the joint fluid. This occurs when there is an excess of uric acid (a normal waste product) in the body. Uric acid builds up in the body when the kidneys are unable to filter enough of it from the blood. This may occur with age. It is also associated with kidney disease. Gout occurs more often in persons with obesity, diabetes, hypertension, or high levels of fats in the blood. It may be run in families. Gout tends to come and go. A flare up of gout is called an attack. Drinking alcohol or eating certain foods (such as shellfish or foods with additives such as high-fructose corn syrup) may increase uric acid levels in the blood and cause a gout attack.  During a gout attack, the affected joint may become a hot, red, swollen and painful. If you have had one attack of gout, you are likely to have another. An attack of gout can be treated with medicine. If these attacks become frequent, a daily medicine may be prescribed to help the kidneys remove uric acid from the body.  Home care  During a gout attack:  · Rest painful joints. If gout affects the joints of your foot or leg, you may want to use crutches for the first few days to keep from bearing weight on the affected joint.  · When sitting or lying down, raise the painful joint to a level higher than your heart.  · Apply an ice pack (ice cubes in a plastic bag wrapped in a thin towel) over the injured area for 20 minutes every 1-2 hours the first day for pain relief. Continue this 3-4 times a day for swelling and pain.  · Avoid alcohol and foods listed below (see Preventing attacks) during a gout attack. Drink extra fluid to help flush the uric acid through your kidneys.  · If you were prescribed a medication to treat gout, take it as your healthcare provider has instructed. Don't skip doses.  · Take anti-inflammatory medicine as directed.   · If pain medicines have been prescribed,  take them exactly as directed.    Preventing attacks  · Minimize or avoid alcohol use. Excess alcohol intake can cause a gout attack.  · Limit these foods and beverages:  ¨ Organ meats, such as kidneys and liver  ¨ Certain seafoods (anchovies, sardines, shrimp, scallops, herring, mackerel)  ¨ Wild game, meat extracts and meat gravies  ¨ Foods and beverages sweetened with high-fructose corn syrup, such as sodas  · Eat a healthy diet including low-fat and nonfat dairy, whole grains, and vegetables.  · If you are overweight, talk to your healthcare provider about a weight reduction plan. Avoid fasting or extreme low calorie diets (less than 900 calories per day). This will increase uric acid levels in the body.  · If you have diabetes or high blood pressure, work with your doctor to manage these conditions.  · Protect the joint from injury. Trauma can trigger a gout attack.  Follow-up care  Follow up with your healthcare provider or as advised.   When to seek medical advice  Call your healthcare provider if you have any of the following:  · Fever over 100.4°F (38.ºC) with worsening joint pain  · Increasing redness around the joint  · Pain developing in another joint  · Repeated vomiting, abdominal pain, or blood in the vomit or stool (black or red color)  Date Last Reviewed: 5/14/2015  © 4536-5107 [employer] [address] [place] [id number], [place] [address]. All rights reserved. This information is not intended as a substitute for professional medical care. Always follow your healthcare professional's instructions.

## 2017-03-07 NOTE — MEDICAL/APP STUDENT
Subjective:       Patient ID: Doug Garibay is a 56 y.o. male.    Chief Complaint: gout flare up X9 days  Toe Pain    The incident occurred more than 1 week ago. Incident location: no injury. There was no injury mechanism. The pain is present in the left foot (Right first toe). The quality of the pain is described as aching. The pain is at a severity of 4/10. The pain is mild. The pain has been constant since onset. Pertinent negatives include no inability to bear weight, loss of motion, loss of sensation, muscle weakness, numbness or tingling. He reports no foreign bodies present. The symptoms are aggravated by weight bearing and movement. Treatments tried: cherry tar juice, water. The treatment provided mild relief.     Review of Systems   Constitutional: Negative for chills, diaphoresis, fatigue and fever.   Respiratory: Negative for cough, chest tightness and shortness of breath.    Cardiovascular: Negative for chest pain, palpitations and leg swelling.   Musculoskeletal: Positive for arthralgias, gait problem (not able to bear weight on foot) and joint swelling (right first toe). Negative for back pain and myalgias.   Skin: Negative for color change and rash.   Neurological: Negative for dizziness, tingling, weakness, light-headedness, numbness and headaches.   Hematological: Negative for adenopathy. Does not bruise/bleed easily.       Objective:      Physical Exam   Constitutional: Vital signs are normal. He appears well-developed and well-nourished.   HENT:   Head: Normocephalic and atraumatic.   Right Ear: External ear normal.   Left Ear: External ear normal.   Nose: Nose normal.   Mouth/Throat: Uvula is midline, oropharynx is clear and moist and mucous membranes are normal.   Eyes: EOM are normal. Pupils are equal, round, and reactive to light.   Neck: Normal range of motion.   Cardiovascular: Normal rate and regular rhythm.    Pulses:       Dorsalis pedis pulses are 2+ on the right side, and 2+ on the  left side.   Pulmonary/Chest: Effort normal and breath sounds normal.   Abdominal: Soft.   Musculoskeletal: Normal range of motion.        Right foot: There is tenderness (great toe ) and swelling (great toe). There is normal range of motion, no bony tenderness, normal capillary refill, no crepitus, no deformity and no laceration.        Left foot: There is tenderness and swelling. There is normal range of motion, no bony tenderness, normal capillary refill, no crepitus, no deformity and no laceration.        Feet:    Feet:   Right Foot:   Skin Integrity: Positive for erythema and warmth.   Left Foot:   Skin Integrity: Positive for erythema and warmth.   Neurological: He is alert.   Skin: Skin is warm and dry.   Psychiatric: He has a normal mood and affect. His behavior is normal.   Nursing note and vitals reviewed.      Assessment:       1. Acute gout of left foot, unspecified cause    2. Acute gout involving toe of right foot, unspecified cause    3. Arthralgia, unspecified joint    4. CKD (chronic kidney disease) stage 3, GFR 30-59 ml/min      Plan:   Doug was seen today for a gout flare up.    Diagnoses and all orders for this visit:    Acute gout of left foot, unspecified cause  -     predniSONE (DELTASONE) 20 MG tablet; 60 mg x 3 days then 40mg x 3 days the 20 mg until complete  -     triamcinolone acetonide injection 40 mg; Inject 1 mL (40 mg total) into the muscle one time.    Acute gout involving toe of right foot, unspecified cause  -     predniSONE (DELTASONE) 20 MG tablet; 60 mg x 3 days then 40mg x 3 days the 20 mg until complete  -     triamcinolone acetonide injection 40 mg; Inject 1 mL (40 mg total) into the muscle one time.    Arthralgia, unspecified joint  -     predniSONE (DELTASONE) 20 MG tablet; 60 mg x 3 days then 40mg x 3 days the 20 mg until complete  -     triamcinolone acetonide injection 40 mg; Inject 1 mL (40 mg total) into the muscle one time.    CKD (chronic kidney disease) stage  3, GFR 30-59 ml/min  The current medical regimen is effective;  continue present plan and medications.      Pt has been given instructions populated from [employer] database and has verbalized understanding of the after visit summary and information contained wherein.     Follow up with a primary care provider. May go to ER for acute shortness of breath, lightheadedness, fever, or any other emergent complaints or changes in condition.      Return if symptoms worsen or fail to improve.

## 2017-03-07 NOTE — MR AVS SNAPSHOT
[facility]  [address]  [place] [address]  Phone: [phone]  Fax: [phone]                  Doug Garibay   3/7/2017 11:15 AM   Office Visit    Description:  Male : 1960   Provider:  JARETH GARDUNO IN   Department:  [facility]           Diagnoses this Visit        Comments    Acute gout of left foot, unspecified cause    -  Primary     Acute gout involving toe of right foot, unspecified cause         Arthralgia, unspecified joint         CKD (chronic kidney disease) stage 3, GFR 30-59 ml/min                To Do List           Future Appointments        Provider Department Dept Phone    3/10/2017 11:00 AM Kentrell Ponce PT [facility] [phone]    3/30/2017 9:30 AM Napoleon Escamilla MD [facility] [phone]      Goals (5 Years of Data)     None      Follow-Up and Disposition     Return if symptoms worsen or fail to improve.       These Medications        Disp Refills Start End    predniSONE (DELTASONE) 20 MG tablet 21 tablet 0 3/7/2017     60 mg x 3 days then 40mg x 3 days the 20 mg until complete    Pharmacy: [facility] [address] [place] [address].  #: [phone]         [facility]     [facility] -  Assistance  Registered nurses in the [facility] provide clinical advisement, health education, appointment booking, and other advisory services.  Call for this free service at [phone].             Medications           Message regarding Medications     Verify the changes and/or additions to your medication regime listed below are the same as discussed with your clinician today.  If any of these changes or additions are incorrect, please notify your healthcare provider.        START taking these NEW medications        Refills    predniSONE (DELTASONE) 20 MG tablet 0    Si mg x 3 days then 40mg x 3 days the 20 mg until complete     "Class: Normal           Verify that the below list of medications is an accurate representation of the medications you are currently taking.  If none reported, the list may be blank. If incorrect, please contact your healthcare provider. Carry this list with you in case of emergency.           Current Medications     acetaminophen (TYLENOL) 500 MG tablet Take 500 mg by mouth every 6 (six) hours as needed for Pain.    aspirin (ECOTRIN) 81 MG EC tablet Take 1 tablet (81 mg total) by mouth once daily. HOLD UNTIL ASPIRIN 325 MG TWICE DAILY IS COMPLETE; RESUME 2/5/17    ferrous sulfate 325 (65 FE) MG EC tablet Take 1 tablet (325 mg total) by mouth once daily.    levothyroxine (SYNTHROID) 150 MCG tablet Take 150 mcg by mouth once daily.    metoprolol succinate (TOPROL-XL) 100 MG 24 hr tablet Take 1 tablet (100 mg total) by mouth every evening.    nifedipine (ADALAT CC) 60 MG TbSR Take 60 mg by mouth once daily.     oxycodone-acetaminophen (PERCOCET)  mg per tablet Take 1 tablet by mouth 3 (three) times daily as needed for Pain.    polyethylene glycol (GLYCOLAX) 17 gram PwPk Take 17 g by mouth once daily.    predniSONE (DELTASONE) 20 MG tablet 60 mg x 3 days then 40mg x 3 days the 20 mg until complete    senna-docusate 8.6-50 mg (PERICOLACE) 8.6-50 mg per tablet Take 1 tablet by mouth 2 (two) times daily.    simvastatin (ZOCOR) 20 MG tablet TAKE ONE TABLET BY MOUTH IN THE EVENING    vit X-[id number]-aodp-rutin-hb196 (BIOFLEX) 772-06-85-40 mg Tab Take 1 tablet by mouth once daily. HOLD UNTIL TOLD TO RESUME BY PHYSICIAN    vitamin D 1000 units Tab Take 1 tablet (1,000 Units total) by mouth once daily.           Clinical Reference Information           Your Vitals Were     BP Pulse Temp Height Weight SpO2    135/83 (BP Location: Left arm, Patient Position: Sitting, BP Method: Manual) 85 99.2 °F (37.3 °C) (Oral) 5' 10" (1.778 m) 100 kg (220 lb 9.1 oz) 99%    BMI                31.65 kg/m2          Blood Pressure          " Most Recent Value    BP  135/83      Allergies as of 3/7/2017     No Known Allergies      Immunizations Administered on Date of Encounter - 3/7/2017     None      [facility] Sign-Up     Activating your [facility] account is as easy as 1-2-3!     1) Visit [email or web address], select Sign Up Now, enter this activation code and your date of birth, then select Next.  [email or web address]  Expires: 4/2/2017  9:59 AM      2) Create a username and password to use when you visit [facility] in the future and select a security question in case you lose your password and select Next.    3) Enter your e-mail address and click Sign Up!    Additional Information  If you have questions, please e-mail [email or web address] or call [phone] to talk to our [facility] staff. Remember, [facility] is NOT to be used for urgent needs. For medical emergencies, dial 911.         Instructions      Gout    Gout is an inflammation of a joint due to a build-up of gout crystals in the joint fluid. This occurs when there is an excess of uric acid (a normal waste product) in the body. Uric acid builds up in the body when the kidneys are unable to filter enough of it from the blood. This may occur with age. It is also associated with kidney disease. Gout occurs more often in persons with obesity, diabetes, hypertension, or high levels of fats in the blood. It may be run in families. Gout tends to come and go. A flare up of gout is called an attack. Drinking alcohol or eating certain foods (such as shellfish or foods with additives such as high-fructose corn syrup) may increase uric acid levels in the blood and cause a gout attack.  During a gout attack, the affected joint may become a hot, red, swollen and painful. If you have had one attack of gout, you are likely to have another. An attack of gout can be treated with medicine. If these attacks become frequent, a daily medicine may be prescribed to help the kidneys remove uric acid from the body.  Home  care  During a gout attack:  · Rest painful joints. If gout affects the joints of your foot or leg, you may want to use crutches for the first few days to keep from bearing weight on the affected joint.  · When sitting or lying down, raise the painful joint to a level higher than your heart.  · Apply an ice pack (ice cubes in a plastic bag wrapped in a thin towel) over the injured area for 20 minutes every 1-2 hours the first day for pain relief. Continue this 3-4 times a day for swelling and pain.  · Avoid alcohol and foods listed below (see Preventing attacks) during a gout attack. Drink extra fluid to help flush the uric acid through your kidneys.  · If you were prescribed a medication to treat gout, take it as your healthcare provider has instructed. Don't skip doses.  · Take anti-inflammatory medicine as directed.   · If pain medicines have been prescribed, take them exactly as directed.    Preventing attacks  · Minimize or avoid alcohol use. Excess alcohol intake can cause a gout attack.  · Limit these foods and beverages:  ¨ Organ meats, such as kidneys and liver  ¨ Certain seafoods (anchovies, sardines, shrimp, scallops, herring, mackerel)  ¨ Wild game, meat extracts and meat gravies  ¨ Foods and beverages sweetened with high-fructose corn syrup, such as sodas  · Eat a healthy diet including low-fat and nonfat dairy, whole grains, and vegetables.  · If you are overweight, talk to your healthcare provider about a weight reduction plan. Avoid fasting or extreme low calorie diets (less than 900 calories per day). This will increase uric acid levels in the body.  · If you have diabetes or high blood pressure, work with your doctor to manage these conditions.  · Protect the joint from injury. Trauma can trigger a gout attack.  Follow-up care  Follow up with your healthcare provider or as advised.   When to seek medical advice  Call your healthcare provider if you have any of the following:  · Fever over 100.4°F  (38.ºC) with worsening joint pain  · Increasing redness around the joint  · Pain developing in another joint  · Repeated vomiting, abdominal pain, or blood in the vomit or stool (black or red color)  Date Last Reviewed: 5/14/2015  © 6295-0547 [employer]. [address] [place] [address]. All rights reserved. This information is not intended as a substitute for professional medical care. Always follow your healthcare professional's instructions.             Language Assistance Services     ATTENTION: Language assistance services are available, free of charge. Please call [phone].      ATENCIÓN: Si habla español, tiene a figueroa disposición servicios gratuitos de asistencia lingüística. Llame al [phone].     CHÚ Ý: N?u b?n nói Ti?ng Vi?t, có các d?ch v? h? tr? ngôn ng? mi?n phí dành cho b?n. G?i s? [phone].         [facility] complies with applicable Federal civil rights laws and does not discriminate on the basis of race, color, national origin, age, disability, or sex.

## 2017-03-07 NOTE — PROGRESS NOTES
Subjective:       Patient ID: Doug Garibay is a 56 y.o. male.    Chief Complaint: gout flare up X9 days  Toe Pain    The incident occurred more than 1 week ago. Incident location: no injury. There was no injury mechanism. The pain is present in the left foot (Right first toe). The quality of the pain is described as aching. The pain is at a severity of 4/10. The pain is mild. The pain has been constant since onset. Pertinent negatives include no inability to bear weight, loss of motion, loss of sensation, muscle weakness, numbness or tingling. He reports no foreign bodies present. The symptoms are aggravated by weight bearing and movement. Treatments tried: cherry tar juice, water. The treatment provided mild relief.     Review of Systems   Constitutional: Negative for chills, diaphoresis, fatigue and fever.   Respiratory: Negative for cough, chest tightness and shortness of breath.    Cardiovascular: Negative for chest pain, palpitations and leg swelling.   Musculoskeletal: Positive for arthralgias, gait problem (not able to bear weight on foot) and joint swelling (right first toe). Negative for back pain and myalgias.   Skin: Negative for color change and rash.   Neurological: Negative for dizziness, tingling, weakness, light-headedness, numbness and headaches.   Hematological: Negative for adenopathy. Does not bruise/bleed easily.       Objective:      Physical Exam   Constitutional: Vital signs are normal. He appears well-developed and well-nourished.   HENT:   Head: Normocephalic and atraumatic.   Right Ear: External ear normal.   Left Ear: External ear normal.   Nose: Nose normal.   Mouth/Throat: Uvula is midline, oropharynx is clear and moist and mucous membranes are normal.   Eyes: EOM are normal. Pupils are equal, round, and reactive to light.   Neck: Normal range of motion.   Cardiovascular: Normal rate and regular rhythm.    Pulses:       Dorsalis pedis pulses are 2+ on the right side, and 2+ on the  left side.   Pulmonary/Chest: Effort normal and breath sounds normal.   Abdominal: Soft.   Musculoskeletal: Normal range of motion.        Right foot: There is tenderness (great toe ) and swelling (great toe). There is normal range of motion, no bony tenderness, normal capillary refill, no crepitus, no deformity and no laceration.        Left foot: There is tenderness and swelling. There is normal range of motion, no bony tenderness, normal capillary refill, no crepitus, no deformity and no laceration.        Feet:    Feet:   Right Foot:   Skin Integrity: Positive for erythema and warmth.   Left Foot:   Skin Integrity: Positive for erythema and warmth.   Neurological: He is alert.   Skin: Skin is warm and dry.   Psychiatric: He has a normal mood and affect. His behavior is normal.   Nursing note and vitals reviewed.      Assessment:       1. Acute gout of left foot, unspecified cause    2. Acute gout involving toe of right foot, unspecified cause    3. Arthralgia, unspecified joint    4. CKD (chronic kidney disease) stage 3, GFR 30-59 ml/min      Plan:   Doug was seen today for a gout flare up.    Diagnoses and all orders for this visit:    Acute gout of left foot, unspecified cause  -     predniSONE (DELTASONE) 20 MG tablet; 60 mg x 3 days then 40mg x 3 days the 20 mg until complete  -     triamcinolone acetonide injection 40 mg; Inject 1 mL (40 mg total) into the muscle one time.    Acute gout involving toe of right foot, unspecified cause  -     predniSONE (DELTASONE) 20 MG tablet; 60 mg x 3 days then 40mg x 3 days the 20 mg until complete  -     triamcinolone acetonide injection 40 mg; Inject 1 mL (40 mg total) into the muscle one time.    Arthralgia, unspecified joint  -     predniSONE (DELTASONE) 20 MG tablet; 60 mg x 3 days then 40mg x 3 days the 20 mg until complete  -     triamcinolone acetonide injection 40 mg; Inject 1 mL (40 mg total) into the muscle one time.    CKD (chronic kidney disease) stage  3, GFR 30-59 ml/min  The current medical regimen is effective;  continue present plan and medications.      Pt has been given instructions populated from [employer] database and has verbalized understanding of the after visit summary and information contained wherein.     Follow up with a primary care provider. May go to ER for acute shortness of breath, lightheadedness, fever, or any other emergent complaints or changes in condition.      Return if symptoms worsen or fail to improve.    I certify that I was present in the room directing the student in service delivery and guiding them using my skilled judgment. As the co-signing provider I have reviewed the students documentation and am responsible for the treatment, assessment, and plan.  See Student's HPI.

## 2017-03-10 ENCOUNTER — CLINICAL SUPPORT (OUTPATIENT)
Dept: REHABILITATION | Facility: HOSPITAL | Age: 57
End: 2017-03-10
Attending: NURSE PRACTITIONER
Payer: COMMERCIAL

## 2017-03-10 DIAGNOSIS — R26.2 DIFFICULTY WALKING: ICD-10-CM

## 2017-03-10 DIAGNOSIS — Z96.653 S/P BILATERAL UNICOMPARTMENTAL KNEE REPLACEMENT: Primary | ICD-10-CM

## 2017-03-10 DIAGNOSIS — M25.669 DECREASED RANGE OF MOTION (ROM) OF KNEE: ICD-10-CM

## 2017-03-10 DIAGNOSIS — R29.898 DECREASED STRENGTH INVOLVING KNEE JOINT: ICD-10-CM

## 2017-03-10 PROCEDURE — 97110 THERAPEUTIC EXERCISES: CPT | Mod: PN

## 2017-03-10 NOTE — PROGRESS NOTES
"                                                    Physical Therapy Daily Note     Name: Doug ALLISON [facility] Number: [id number]  Diagnosis:   Encounter Diagnoses   Name Primary?    S/P bilateral unicompartmental knee replacement Yes    Difficulty walking     Decreased strength involving knee joint     Decreased range of motion (ROM) of knee      Physician: Alice Tony NP  Precautions: WBAT B LE  Visit #: 9 of 20  ALLEN: 12/31/17    Time In: 1100  Time Out: 1200  Total Treatment Time 1:1: 60 minutes with PT/tech    DOS: 1/4/17    Subjective     Pt reports: He cancelled previous treatment sessions due to acute flare-up of Gout in B feet. Pt is currently symptom-free in B knee/feet.  Pain Scale: Doug ALLISON rates pain on a scale of 0-10 to be 1 currently.    Objective     Range of Motion: Knee   Left Right   Flexion: 135 AAROM 130 AAROM   Extension 0 0       Doug ALLISON received individual therapeutic exercises to develop strength, endurance, ROM, flexibility, posture and core stabilization for 50 minutes including:    Upright Bike 5 min  Gastroc Stretch on incline board 3x30"  Step-up 6" 2x10 B  Lateral Step-up 6" 15x ea LE  Lateral Step-down 4" 2x10 ea LE  Standing TKE c/ pink cord 2x10 ea  Lateral Stepping c/ GTB around knees 2 laps pole-pole    Mini Wall Squat c/ physioball 2x15  Knee Ext Matrix 2x15 15#  Knee Flex Matrix  2x15 15#  Leg Press 3x10 55#    B Hamstring Str 2x30" ea   SLR 4-way 2x10 B 5#  Standing Hip Abd/Ext c/ RTB 2x10  Heel Prop 5# 3 min    Heel Slides c/ slide board 15x B  Bridges 2x10  Quad Set 2x10 5"    Doug ALLISON received the following neuromuscular re-education: balance, coordination, and motor control activities were performed for 5 minutes including:  Tandem Stance 2x30" each LE   Tandem Stance 2x30" each LE c/ airex foam  Single-limb stance 2x30" ea LE c/ blue foam    Doug ALLISON received the following manual therapy techniques: Soft tissue Mobilization and Joint Mobilization " were applied to the: B Knees  for 5 minutes including:  Scar Tissue Mobilization; IASTM   Patella Glides - all directions     Written Home Exercises Provided: Yes - SLR, Hip Abd, Hip Ext  Pt demo good understanding of the education provided. Doug ALLISON demonstrated good return demonstration of activities.     PT/PTA face to face conference performed during this session    Assessment     Patient tolerated treatment very well this session. Pt progressed towards increased hip strength and endurance training, with fair maintenance of exercise form. Pt continues to have difficulty with R eccentric quad strengthening while performing lateral step down activity, with hip drop and decreased exercise form when compared to L quad. Pt to continue progressing towards normalized ROM, strength, endurance, and balance.    This is a 56 y.o. male referred to outpatient physical therapy and presents with a medical diagnosis of S/p B TKA and demonstrates limitations as described in the problem list. Pt prognosis is Good. Pt will continue to benefit from skilled outpatient physical therapy to address the deficits listed in the problem list, provide pt/family education and to maximize pt's level of independence in the home and community environment.     Goals as follows:  Short Term GOALS: 4 weeks. Pt agrees with goals set.  1. Patient demonstrates independence with HEP.   2. Patient demonstrates independence with Postural Awareness.   3. Patient demonstrates increased strength BLE's to 4+/5 or greater to improve tolerance to functional activities pain free.   4. Patient demonstrates ability to ascend/descend 1 flight of stairs, no use of UE on rail, reciprocal step pattern, no symptom provocation, no LOB, to improve efficiency with bed-room access  5. Patient demonstrates ability to independently walk 1/2 mile, on flat surface, proper gait pattern, no symptom provocation to improve tolerance for recreational exercise     Plan      Certification Period: 1/24/17 - 4/24/17    Continue with established Plan of Care towards PT goals.    Therapist: Kentrell Ponce, PT  3/10/2017

## 2017-03-14 ENCOUNTER — CLINICAL SUPPORT (OUTPATIENT)
Dept: REHABILITATION | Facility: HOSPITAL | Age: 57
End: 2017-03-14
Attending: NURSE PRACTITIONER
Payer: COMMERCIAL

## 2017-03-14 DIAGNOSIS — R29.898 DECREASED STRENGTH INVOLVING KNEE JOINT: ICD-10-CM

## 2017-03-14 DIAGNOSIS — Z96.653 S/P BILATERAL UNICOMPARTMENTAL KNEE REPLACEMENT: Primary | ICD-10-CM

## 2017-03-14 DIAGNOSIS — R26.2 DIFFICULTY WALKING: ICD-10-CM

## 2017-03-14 DIAGNOSIS — M25.669 DECREASED RANGE OF MOTION (ROM) OF KNEE: ICD-10-CM

## 2017-03-14 PROCEDURE — 97110 THERAPEUTIC EXERCISES: CPT | Mod: PN

## 2017-03-14 PROCEDURE — 97112 NEUROMUSCULAR REEDUCATION: CPT | Mod: PN

## 2017-03-14 NOTE — PROGRESS NOTES
"                                                    Physical Therapy Daily Note     Name: Doug ALLISON [facility] Number: [id number]  Diagnosis:   Encounter Diagnoses   Name Primary?    S/P bilateral unicompartmental knee replacement Yes    Difficulty walking     Decreased strength involving knee joint     Decreased range of motion (ROM) of knee      Physician: Alice Tony NP  Precautions: WBAT B LE  Visit #:10 of 20  ALLEN: 12/31/17    Time In: 730  Time Out: 830  Total Treatment Time 1:1: 60 minutes with PT/tech    DOS: 1/4/17    Subjective     Pt reports: He recently went on beach trip, states he performed significant walking in sand, no symptom provocation but was difficult..  Pain Scale: Doug ALLISON rates pain on a scale of 0-10 to be 1 currently.    Objective     Range of Motion: Knee   Left Right   Flexion: 135 AAROM 130 AAROM   Extension 0 0       Doug ALLISON received individual therapeutic exercises to develop strength, endurance, ROM, flexibility, posture and core stabilization for 50 minutes including:    Upright Bike 5 min  Gastroc Stretch on incline board 3x30"  Step-up 6" 2x10 B  Lateral Step-up 6" 15x ea LE  Lateral Step-down 4" 2x10 ea LE  Standing TKE c/ pink cord 2x10 ea  Lateral Stepping c/ GTB around knees 2 laps pole-pole    Mini Wall Squat c/ physioball 2x15  Knee Ext Matrix 2x15 15#  Knee Flex Matrix  2x15 15#  Leg Press 3x10 55#    B Hamstring Str 2x30" ea   SLR 4-way 2x10 B 5#  Standing Hip Abd/Ext c/ RTB 2x10  Heel Prop 5# 3 min    Heel Slides c/ slide board 15x B  Bridges 2x10  Quad Set 2x10 5"    Doug ALLISON received the following neuromuscular re-education: balance, coordination, and motor control activities were performed for 5 minutes including:  Tandem Stance 2x30" each LE   Tandem Stance 2x30" each LE c/ airex foam  Single-limb stance 2x30" ea LE c/ blue foam  Single-limb stance 1x30" ea LE c/ blue foam c/ ball toss    Doug ALLISON received the following manual therapy " techniques: Soft tissue Mobilization and Joint Mobilization were applied to the: B Knees  for 5 minutes including:  Scar Tissue Mobilization; IASTM   Patella Glides - all directions     Written Home Exercises Provided: Yes - SLR, Hip Abd, Hip Ext  Pt demo good understanding of the education provided. Doug ALLISON demonstrated good return demonstration of activities.     PT/PTA face to face conference performed during this session    Assessment     Patient tolerated treatment very well this session. Pt with progression towards dynamic standing balance on variable standing surface, with catch/throwing, improving performance as activity progressed due to increased proprioception. Pt with mild difficulty maintaining proper exercise form while performing prone hip extension with resistance due to remaining deficits in gluteal strength and endurance.    This is a 56 y.o. male referred to outpatient physical therapy and presents with a medical diagnosis of S/p B TKA and demonstrates limitations as described in the problem list. Pt prognosis is Good. Pt will continue to benefit from skilled outpatient physical therapy to address the deficits listed in the problem list, provide pt/family education and to maximize pt's level of independence in the home and community environment.     Goals as follows:  Short Term GOALS: 4 weeks. Pt agrees with goals set.  1. Patient demonstrates independence with HEP.   2. Patient demonstrates independence with Postural Awareness.   3. Patient demonstrates increased strength BLE's to 4+/5 or greater to improve tolerance to functional activities pain free.   4. Patient demonstrates ability to ascend/descend 1 flight of stairs, no use of UE on rail, reciprocal step pattern, no symptom provocation, no LOB, to improve efficiency with bed-room access  5. Patient demonstrates ability to independently walk 1/2 mile, on flat surface, proper gait pattern, no symptom provocation to improve tolerance for  recreational exercise     Plan     Certification Period: 1/24/17 - 4/24/17    Continue with established Plan of Care towards PT goals.    Therapist: Kentrell Ponce, PT  3/14/2017

## 2017-03-17 ENCOUNTER — CLINICAL SUPPORT (OUTPATIENT)
Dept: REHABILITATION | Facility: HOSPITAL | Age: 57
End: 2017-03-17
Attending: NURSE PRACTITIONER
Payer: COMMERCIAL

## 2017-03-17 DIAGNOSIS — M25.669 DECREASED RANGE OF MOTION (ROM) OF KNEE: ICD-10-CM

## 2017-03-17 DIAGNOSIS — R26.2 DIFFICULTY WALKING: Primary | ICD-10-CM

## 2017-03-17 DIAGNOSIS — Z96.653 S/P BILATERAL UNICOMPARTMENTAL KNEE REPLACEMENT: ICD-10-CM

## 2017-03-17 DIAGNOSIS — R29.898 DECREASED STRENGTH INVOLVING KNEE JOINT: ICD-10-CM

## 2017-03-17 PROCEDURE — 97112 NEUROMUSCULAR REEDUCATION: CPT | Mod: PN

## 2017-03-17 PROCEDURE — 97110 THERAPEUTIC EXERCISES: CPT | Mod: PN

## 2017-03-17 NOTE — PROGRESS NOTES
"                                                    Physical Therapy Daily Note     Name: Doug ALLISON [facility] Number: [id number]  Diagnosis:   Encounter Diagnoses   Name Primary?    Difficulty walking Yes    S/P bilateral unicompartmental knee replacement     Decreased strength involving knee joint     Decreased range of motion (ROM) of knee      Physician: Alice Tony NP  Precautions: WBAT B LE  Visit #:11 of 20  ALLEN: 12/31/17    Time In: 830  Time Out: 930  Total Treatment Time 1:1: 60 minutes with PT/tech    DOS: 1/4/17    Subjective     Pt reports: He performed increased activity yesterday performing yard work.  Pain Scale: Doug ALLISON rates pain on a scale of 0-10 to be 1 currently.    Objective     Range of Motion: Knee   Left Right   Flexion: 135 AAROM 130 AAROM   Extension 0 0       Doug ALLISON received individual therapeutic exercises to develop strength, endurance, ROM, flexibility, posture and core stabilization for 50 minutes including:    Upright Bike 5 min  Gastroc Stretch on incline board 3x30"  Step-up 6" 2x10 B  Lateral Step-up 6" 15x ea LE  Lateral Step-down 4" 2x10 ea LE  Standing TKE c/ pink cord 2x10 ea  Lateral Stepping c/ GTB around knees 2 laps pole-pole  Mini-lunges 10x ea    Wall Squat c/ physioball 2x15  Knee Ext Matrix 2x15 25#  Knee Flex Matrix  2x15 25#  Leg Press 3x10 60#    B Hamstring Str 2x30" ea   SLR 4-way 2x10 B 4#  Standing Hip Abd/Ext c/ RTB 2x10  Heel Prop 5# 3 min    Heel Slides c/ slide board 15x B  Bridges 2x10  Quad Set 2x10 5"    Doug ALLISON received the following neuromuscular re-education: balance, coordination, and motor control activities were performed for 10 minutes including:  Tandem Stance 2x30" each LE   Tandem Stance 2x30" each LE c/ airex foam c/ ball toss  Single-limb stance 2x30" ea LE c/ blue foam  Single-limb stance 1x30" ea LE c/ blue foam c/ ball toss    Doug ALLISON received the following manual therapy techniques: Soft tissue Mobilization " and Joint Mobilization were applied to the: B Knees  for 0 minutes including:  Scar Tissue Mobilization; IASTM   Patella Glides - all directions     Written Home Exercises Provided: Yes - SLR, Hip Abd, Hip Ext  Pt demo good understanding of the education provided. Doug ALLISON demonstrated good return demonstration of activities.     PT/PTA face to face conference performed during this session    Assessment     Patient tolerated treatment very well this session. Pt demonstrates good progression with dynamic standing balance on variable surface due to improving proprioception and motor control. Pt with difficulty performing lunge activity due to deficits remaining in B LE strength, significant B HHA required for movement pattern. Pt with improved quality of ambulation presented throughout clinic, no significant antalgic gait.    This is a 56 y.o. male referred to outpatient physical therapy and presents with a medical diagnosis of S/p B TKA and demonstrates limitations as described in the problem list. Pt prognosis is Good. Pt will continue to benefit from skilled outpatient physical therapy to address the deficits listed in the problem list, provide pt/family education and to maximize pt's level of independence in the home and community environment.     Goals as follows:  Short Term GOALS: 4 weeks. Pt agrees with goals set.  1. Patient demonstrates independence with HEP.   2. Patient demonstrates independence with Postural Awareness.   3. Patient demonstrates increased strength BLE's to 4+/5 or greater to improve tolerance to functional activities pain free.   4. Patient demonstrates ability to ascend/descend 1 flight of stairs, no use of UE on rail, reciprocal step pattern, no symptom provocation, no LOB, to improve efficiency with bed-room access  5. Patient demonstrates ability to independently walk 1/2 mile, on flat surface, proper gait pattern, no symptom provocation to improve tolerance for recreational  exercise     Plan     Certification Period: 1/24/17 - 4/24/17    Continue with established Plan of Care towards PT goals.    Therapist: Kentrell Ponce, PT  3/17/2017

## 2017-03-24 ENCOUNTER — CLINICAL SUPPORT (OUTPATIENT)
Dept: REHABILITATION | Facility: HOSPITAL | Age: 57
End: 2017-03-24
Attending: NURSE PRACTITIONER
Payer: COMMERCIAL

## 2017-03-24 DIAGNOSIS — Z96.653 S/P BILATERAL UNICOMPARTMENTAL KNEE REPLACEMENT: Primary | ICD-10-CM

## 2017-03-24 DIAGNOSIS — R26.2 DIFFICULTY WALKING: ICD-10-CM

## 2017-03-24 DIAGNOSIS — R29.898 DECREASED STRENGTH INVOLVING KNEE JOINT: ICD-10-CM

## 2017-03-24 DIAGNOSIS — M25.669 DECREASED RANGE OF MOTION (ROM) OF KNEE: ICD-10-CM

## 2017-03-24 PROCEDURE — 97110 THERAPEUTIC EXERCISES: CPT | Mod: PN

## 2017-03-24 PROCEDURE — 97112 NEUROMUSCULAR REEDUCATION: CPT | Mod: PN

## 2017-03-24 NOTE — PROGRESS NOTES
"                                                    Physical Therapy Daily Note     Name: Doug ALLISON [facility] Number: [id number]  Diagnosis:   Encounter Diagnoses   Name Primary?    S/P bilateral unicompartmental knee replacement Yes    Difficulty walking     Decreased strength involving knee joint     Decreased range of motion (ROM) of knee      Physician: Alice Tony NP  Precautions: WBAT B LE  Visit #:12 of 20  ALLEN: 12/31/17    Time In: 905  Time Out: 1000  Total Treatment Time 1:1: 55 minutes with PT/tech    DOS: 1/4/17    Subjective     Pt reports: He missed previous session due to going out of town. States his legs feel heavy at the end of the day.  Pain Scale: Doug ALLISON rates pain on a scale of 0-10 to be 1 currently.    Objective     Range of Motion: Knee   Left Right   Flexion: 135 AAROM 130 AAROM   Extension 0 0       Doug ALLISON received individual therapeutic exercises to develop strength, endurance, ROM, flexibility, posture and core stabilization for 55 minutes including:    Upright Bike 5 min  Gastroc Stretch on incline board 3x30"  Step-up 6" 2x10 B  Elliptical 6 min  Lateral Step-down 4" 2x10 ea LE  Standing TKE c/ pink cord 2x10 ea  Lateral Stepping c/ GTB around knees 2 laps pole-pole  Mini-lunges 10x ea    Wall Squat c/ physioball 2x15  Knee Ext Matrix 2x15 30#  Knee Flex Matrix  2x15 45#  Leg Press 3x10 60#    B Hamstring Str 2x30" ea   SLR 4-way 2x10 B 4#  Standing Hip Abd/Ext c/ RTB 2x10  Lateral Step-up 6" 15x ea LE  Heel Prop 5# 3 min    Heel Slides c/ slide board 15x B  Bridges 2x10  Quad Set 2x10 5"    Doug ALLISON received the following neuromuscular re-education: balance, coordination, and motor control activities were performed for 8 minutes including:  Tandem Stance 2x30" each LE   Tandem Stance 2x30" each LE c/ airex foam c/ ball toss  Single-limb stance 2x30" ea LE c/ blue foam  Single-limb stance c/ blue foam trampoline rebounder 15x B      Doug ALLISON received the " following manual therapy techniques: Soft tissue Mobilization and Joint Mobilization were applied to the: B Knees  for 0 minutes including:  Scar Tissue Mobilization; IASTM   Patella Glides - all directions     Written Home Exercises Provided: Yes - SLR, Hip Abd, Hip Ext  Pt demo good understanding of the education provided. Doug ALLISON demonstrated good return demonstration of activities.     PT/PTA face to face conference performed during this session    Assessment     Patient tolerated treatment very well this session. Pt with good progression towards dynamic balance activities on trampoline rebounder, with mild difficulty, no symptom provocation. Pt continues to present with eccentric strength deficits in R LE while performing lateral step down activity. Pt tolerated introduction to elliptical warm-up activity aimed at utilizing increased LE strength and endurance.    This is a 56 y.o. male referred to outpatient physical therapy and presents with a medical diagnosis of S/p B TKA and demonstrates limitations as described in the problem list. Pt prognosis is Good. Pt will continue to benefit from skilled outpatient physical therapy to address the deficits listed in the problem list, provide pt/family education and to maximize pt's level of independence in the home and community environment.     Goals as follows:  Short Term GOALS: 4 weeks. Pt agrees with goals set.  1. Patient demonstrates independence with HEP. met  2. Patient demonstrates independence with Postural Awareness. met  3. Patient demonstrates increased strength BLE's to 4+/5 or greater to improve tolerance to functional activities pain free. Continue for all muscle groups  4. Patient demonstrates ability to ascend/descend 1 flight of stairs, no use of UE on rail, reciprocal step pattern, no symptom provocation, no LOB, to improve efficiency with bed-room access met  5. Patient demonstrates ability to independently walk 1/2 mile, on flat surface,  proper gait pattern, no symptom provocation to improve tolerance for recreational exercise met     Long Term GOALS: 8 weeks. Pt agrees with goals set.  1. Patient demonstrates increased B knee flexion range of motion to 125 degrees to improve tolerance to functional activities pain free. met  2. Patient demonstrates increased strength BLE's to 5/5 or greater to improve tolerance to functional activities pain free. continue  3. Patient demonstrates improved overall function per FOTO Knee Survey to 40% Limitation or less. met  4. Patient demonstrates ability to independently walk 1 mile, on flat surface, proper gait pattern, no symptom provocation to improve tolerance to community ambulation while traveling met  5. Patient demonstrates no difficulty performing functional squat or stooping to perform yard/garden work, with no symptom provocation, no LOB not met, continue    Functional Limitations Reports - G Codes  Category: mobility  Tool: FOTO Knee Survey  Score: 23% Limitation   Modifier  Impairment Limitation Restriction    CH  0 % impaired, limited or restricted    CI  @ least 1% but less than 20% impaired, limited or restricted    CJ  @ least 20%<40% impaired, limited or restricted    CK  @ least 40%<60% impaired, limited or restricted    CL  @ least 60% <80% impaired, limited or restricted    CM  @ least 80%<100% impaired limited or restricted    CN  100% impaired, limited or restricted     Current/  CJ = 20-40% Limitation  Goal/ : CJ = 20-40% Limitation     Plan     Certification Period: 1/24/17 - 4/24/17    Continue with established Plan of Care towards PT goals.    Therapist: Kentrell Ponce, PT  3/24/2017

## 2017-03-30 ENCOUNTER — HOSPITAL ENCOUNTER (OUTPATIENT)
Dept: RADIOLOGY | Facility: HOSPITAL | Age: 57
Discharge: HOME | End: 2017-03-30
Attending: ORTHOPAEDIC SURGERY
Payer: COMMERCIAL

## 2017-03-30 ENCOUNTER — OFFICE VISIT (OUTPATIENT)
Dept: ORTHOPEDICS | Facility: CLINIC | Age: 57
End: 2017-03-30
Payer: COMMERCIAL

## 2017-03-30 VITALS — HEIGHT: 70 IN | BODY MASS INDEX: 31.56 KG/M2 | WEIGHT: 220.44 LBS

## 2017-03-30 DIAGNOSIS — M25.562 PAIN IN BOTH KNEES, UNSPECIFIED CHRONICITY: Primary | ICD-10-CM

## 2017-03-30 DIAGNOSIS — M25.561 PAIN IN BOTH KNEES, UNSPECIFIED CHRONICITY: Primary | ICD-10-CM

## 2017-03-30 DIAGNOSIS — M25.561 PAIN IN BOTH KNEES, UNSPECIFIED CHRONICITY: ICD-10-CM

## 2017-03-30 DIAGNOSIS — M25.562 PAIN IN BOTH KNEES, UNSPECIFIED CHRONICITY: ICD-10-CM

## 2017-03-30 PROCEDURE — 99999 PR PBB SHADOW E&M-EST. PATIENT-LVL III: CPT | Mod: PBBFAC,,, | Performed by: ORTHOPAEDIC SURGERY

## 2017-03-30 PROCEDURE — 99024 POSTOP FOLLOW-UP VISIT: CPT | Mod: S$GLB,,, | Performed by: ORTHOPAEDIC SURGERY

## 2017-03-30 PROCEDURE — 20610 DRAIN/INJ JOINT/BURSA W/O US: CPT | Mod: 58,50,S$GLB, | Performed by: ORTHOPAEDIC SURGERY

## 2017-03-30 NOTE — PROCEDURES
Large Joint Aspiration/Injection  Date/Time: 3/30/2017 10:58 AM  Performed by: AKIKO LAND  Authorized by: AKIKO LAND     Consent Done?:  Yes (Written)  Indications:  Pain  Procedure site marked: Yes    Timeout: Prior to procedure the correct patient, procedure, and site was verified      Location:  Knee  Site:  L knee  Prep: Patient was prepped and draped in usual sterile fashion    Needle size:  18 G  Approach:  Lateral  Aspirate amount (ml):  40  Aspirate:  Clear, serous and blood-tinged  Patient tolerance:  Patient tolerated the procedure well with no immediate complications

## 2017-03-30 NOTE — MR AVS SNAPSHOT
[facility]  [address]  [facility] [address]  Phone: [phone]                  Doug Garibay   3/30/2017 9:30 AM   Office Visit    Description:  Male : 1960   Provider:  Napoleon Escamilla MD   Department:  [facility]           Reason for Visit     Post-op Evaluation           Diagnoses this Visit        Comments    Pain in both knees, unspecified chronicity    -  Primary            To Do List           Future Appointments        Provider Department Dept Phone    2017 10:00 AM Kentrell Ponce, PT [facility] [phone]    2017 10:00 AM Kentrell Ponce PT [facility] [phone]    2017 10:40 AM Erik Boyle MD [facility] [phone]      Goals (5 Years of Data)     None      [facility]     [facility] Nurse Care Line -  Assistance  Unless otherwise directed by your provider, please contact [facility], our nurse care line that is available for  assistance.     Registered nurses in the [facility] provide: appointment scheduling, clinical advisement, health education, and other advisory services.  Call: [phone] (toll free)               Medications           Message regarding Medications     Verify the changes and/or additions to your medication regime listed below are the same as discussed with your clinician today.  If any of these changes or additions are incorrect, please notify your healthcare provider.             Verify that the below list of medications is an accurate representation of the medications you are currently taking.  If none reported, the list may be blank. If incorrect, please contact your healthcare provider. Carry this list with you in case of emergency.           Current Medications     acetaminophen (TYLENOL) 500 MG tablet Take 500 mg by mouth every 6 (six) hours as needed for Pain.    aspirin (ECOTRIN) 81 MG EC  "tablet Take 1 tablet (81 mg total) by mouth once daily. HOLD UNTIL ASPIRIN 325 MG TWICE DAILY IS COMPLETE; RESUME 2/5/17    ferrous sulfate 325 (65 FE) MG EC tablet Take 1 tablet (325 mg total) by mouth once daily.    levothyroxine (SYNTHROID) 150 MCG tablet Take 150 mcg by mouth once daily.    metoprolol succinate (TOPROL-XL) 100 MG 24 hr tablet Take 1 tablet (100 mg total) by mouth every evening.    nifedipine (ADALAT CC) 60 MG TbSR Take 60 mg by mouth once daily.     oxycodone-acetaminophen (PERCOCET)  mg per tablet Take 1 tablet by mouth 3 (three) times daily as needed for Pain.    polyethylene glycol (GLYCOLAX) 17 gram PwPk Take 17 g by mouth once daily.    predniSONE (DELTASONE) 20 MG tablet 60 mg x 3 days then 40mg x 3 days the 20 mg until complete    senna-docusate 8.6-50 mg (PERICOLACE) 8.6-50 mg per tablet Take 1 tablet by mouth 2 (two) times daily.    simvastatin (ZOCOR) 20 MG tablet TAKE ONE TABLET BY MOUTH IN THE EVENING    vit Z-zkqejge-[id number]-rutin-hb196 (BIOFLEX) 550-94-47-40 mg Tab Take 1 tablet by mouth once daily. HOLD UNTIL TOLD TO RESUME BY PHYSICIAN    vitamin D 1000 units Tab Take 1 tablet (1,000 Units total) by mouth once daily.           Clinical Reference Information           Your Vitals Were     Height Weight BMI          5' 10" (1.778 m) 100 kg (220 lb 7.4 oz) 31.63 kg/m2        Allergies as of 3/30/2017     No Known Allergies      Immunizations Administered on Date of Encounter - 3/30/2017     None      [facility] Sign-Up     Activating your [facility] account is as easy as 1-2-3!     1) Visit [email or web address], select Sign Up Now, enter this activation code and your date of birth, then select Next.  [email or web address]  Expires: 4/2/2017 10:59 AM      2) Create a username and password to use when you visit [facility] in the future and select a security question in case you lose your password and select Next.    3) Enter your e-mail address and click Sign Up!    Additional Information  If " you have questions, please e-mail [email or web address] or call [phone] to talk to our [facility] staff. Remember, [facility] is NOT to be used for urgent needs. For medical emergencies, dial 911.         Language Assistance Services     ATTENTION: Language assistance services are available, free of charge. Please call [phone].      ATENCIÓN: Si habla español, tiene a figueroa disposición servicios gratuitos de asistencia lingüística. Llame al [phone].     CHÚ Ý: N?u b?n nói Ti?ng Vi?t, có các d?ch v? h? tr? ngôn ng? mi?n phí dành cho b?n. G?i s? [phone].         [facility] complies with applicable Federal civil rights laws and does not discriminate on the basis of race, color, national origin, age, disability, or sex.

## 2017-03-30 NOTE — PROCEDURES
Large Joint Aspiration/Injection  Date/Time: 3/30/2017 10:57 AM  Performed by: AKIKO LAND  Authorized by: AKIKO LAND     Consent Done?:  Yes (Written)  Indications:  Pain  Procedure site marked: Yes    Timeout: Prior to procedure the correct patient, procedure, and site was verified      Location:  Knee  Site:  R knee  Prep: Patient was prepped and draped in usual sterile fashion    Needle size:  18 G  Approach:  Lateral  Aspirate amount (ml):  80  Aspirate:  Blood-tinged, clear and serous  Patient tolerance:  Patient tolerated the procedure well with no immediate complications

## 2017-03-30 NOTE — PROGRESS NOTES
Mr. Garibay comes in today nearly 3 months after his bilateral knee replacements.  He is doing very well.  He has no pain whatsoever and is back to more activity than he started a very long time.  Most likely due to this increased activity he has significant effusions on both knees.  He is no redness or warmth but just large effusion Center making his knees feel very heavy.  Given this I aspirated both of his knees giving 40 cc of fluid off the left knee and 80 cc of fluid off the right knee.  He felt better immediately after these aspirations.  I told him he needs to take it easy for a couple weeks and then he can get back to doing some activity but not near as much activity as his been doing.    Of his knees his knees have loosened up a little bit but not significantly.  He has less than a centimeter of opening medial and lateral on bilateral knees and he is less than a centimeter of AP translation on bilateral knees.  He is no pain going down stairs.  He has no tenderness palpation at the pes anserine or gerdys tubercle.     He will follow-up in 3 months.

## 2017-04-24 ENCOUNTER — DOCUMENTATION ONLY (OUTPATIENT)
Dept: REHABILITATION | Facility: HOSPITAL | Age: 57
End: 2017-04-24

## 2017-04-24 PROBLEM — R29.898 DECREASED STRENGTH INVOLVING KNEE JOINT: Status: RESOLVED | Noted: 2017-01-24 | Resolved: 2017-04-24

## 2017-04-24 PROBLEM — M25.669 DECREASED RANGE OF MOTION (ROM) OF KNEE: Status: RESOLVED | Noted: 2017-01-24 | Resolved: 2017-04-24

## 2017-04-24 PROBLEM — R26.2 DIFFICULTY WALKING: Status: RESOLVED | Noted: 2017-01-24 | Resolved: 2017-04-24

## 2017-04-24 NOTE — PROGRESS NOTES
PHYSICAL THERAPY DISCHARGE:    This patient was originally evaluated at our facility on: 1/24/17         Pt attended PT for a total of 12 Visits receiving: Manual Therapy, Therex, Postural Education, Body Mechanics Training, Home Exercise Instruction     This patient's last visit occurred on: 3/24/17      Short term goals were achieved: 5/5    Long term goals were achieved: 4/5    Pt was DC'd from our care secondary to: Pt requested to discharge following MD clearance       Therapist: Kentrell Ponce, PT  4/24/2017

## 2017-05-08 ENCOUNTER — TELEPHONE (OUTPATIENT)
Dept: NEPHROLOGY | Facility: CLINIC | Age: 57
End: 2017-05-08

## 2017-05-08 DIAGNOSIS — N18.30 CHRONIC KIDNEY DISEASE, STAGE III (MODERATE): Primary | ICD-10-CM

## 2017-05-15 ENCOUNTER — LAB VISIT (OUTPATIENT)
Dept: LAB | Facility: HOSPITAL | Age: 57
End: 2017-05-15
Attending: INTERNAL MEDICINE
Payer: COMMERCIAL

## 2017-05-15 DIAGNOSIS — N18.30 CHRONIC KIDNEY DISEASE, STAGE III (MODERATE): ICD-10-CM

## 2017-05-15 LAB
ALBUMIN SERPL BCP-MCNC: 3.4 G/DL
ANION GAP SERPL CALC-SCNC: 13 MMOL/L
BUN SERPL-MCNC: 32 MG/DL
CALCIUM SERPL-MCNC: 10.6 MG/DL
CHLORIDE SERPL-SCNC: 108 MMOL/L
CO2 SERPL-SCNC: 19 MMOL/L
CREAT SERPL-MCNC: 2.2 MG/DL
EST. GFR  (AFRICAN AMERICAN): 36.8 ML/MIN/1.73 M^2
EST. GFR  (NON AFRICAN AMERICAN): 31.8 ML/MIN/1.73 M^2
GLUCOSE SERPL-MCNC: 100 MG/DL
PHOSPHATE SERPL-MCNC: 2.9 MG/DL
POTASSIUM SERPL-SCNC: 4.8 MMOL/L
PTH-INTACT SERPL-MCNC: 316 PG/ML
SODIUM SERPL-SCNC: 140 MMOL/L

## 2017-05-15 PROCEDURE — 36415 COLL VENOUS BLD VENIPUNCTURE: CPT | Mod: PO

## 2017-05-15 PROCEDURE — 80069 RENAL FUNCTION PANEL: CPT

## 2017-05-15 PROCEDURE — 83970 ASSAY OF PARATHORMONE: CPT

## 2017-05-19 ENCOUNTER — OFFICE VISIT (OUTPATIENT)
Dept: NEPHROLOGY | Facility: CLINIC | Age: 57
End: 2017-05-19
Payer: COMMERCIAL

## 2017-05-19 VITALS
DIASTOLIC BLOOD PRESSURE: 80 MMHG | HEART RATE: 82 BPM | SYSTOLIC BLOOD PRESSURE: 140 MMHG | WEIGHT: 231.69 LBS | OXYGEN SATURATION: 98 % | HEIGHT: 70 IN | BODY MASS INDEX: 33.17 KG/M2

## 2017-05-19 DIAGNOSIS — N17.9 ACUTE KIDNEY INJURY (NONTRAUMATIC): ICD-10-CM

## 2017-05-19 DIAGNOSIS — N18.30 CHRONIC KIDNEY DISEASE, STAGE III (MODERATE): Primary | ICD-10-CM

## 2017-05-19 PROCEDURE — 99213 OFFICE O/P EST LOW 20 MIN: CPT | Mod: S$GLB,,, | Performed by: INTERNAL MEDICINE

## 2017-05-19 PROCEDURE — 99999 PR PBB SHADOW E&M-EST. PATIENT-LVL III: CPT | Mod: PBBFAC,,, | Performed by: INTERNAL MEDICINE

## 2017-05-19 PROCEDURE — 3079F DIAST BP 80-89 MM HG: CPT | Mod: S$GLB,,, | Performed by: INTERNAL MEDICINE

## 2017-05-19 PROCEDURE — 1160F RVW MEDS BY RX/DR IN RCRD: CPT | Mod: S$GLB,,, | Performed by: INTERNAL MEDICINE

## 2017-05-19 PROCEDURE — 3077F SYST BP >= 140 MM HG: CPT | Mod: S$GLB,,, | Performed by: INTERNAL MEDICINE

## 2017-05-24 NOTE — PROGRESS NOTES
Subjective:       Patient ID: Doug Garibay is a 56 y.o. White male who presents for follow up of Chronic Kidney Disease    HPI     Mr. Garibay is a 56 year old man with past medical history of hypertension, hypothyroidism, hyperparathyroidism presenting for follow up of acute kidney injury and chronic kidney disease.  Patient reports knee pain improved since recent intervention per Ortho, previously had been on NSAID's (not currently).  He otherwise denies any fever, chest pain, shortness of breath, abdominal pain, diarrhea, dysuria/hematuria.  He reports only moderate daily fluid intake, does not check his blood pressure at home.      Review of Systems   Constitutional: Negative for appetite change, fatigue and fever.   Respiratory: Negative for cough and shortness of breath.    Cardiovascular: Negative for chest pain and leg swelling.   Gastrointestinal: Negative for abdominal pain, constipation, diarrhea, nausea and vomiting.   Genitourinary: Negative for dysuria, flank pain, frequency, hematuria and urgency.   Musculoskeletal: Negative for arthralgias, back pain and joint swelling.   Skin: Negative for rash.   Neurological: Negative for dizziness and light-headedness.   All other systems reviewed and are negative.      Objective:      Physical Exam   Constitutional: He appears well-developed and well-nourished.   Cardiovascular: Normal rate and regular rhythm.  Exam reveals no gallop and no friction rub.    No murmur heard.  Pulmonary/Chest: Effort normal and breath sounds normal. No respiratory distress. He has no wheezes. He has no rales.   Musculoskeletal: He exhibits no edema.   Neurological: He is alert.   Skin: Skin is warm and dry. No rash noted.   Vitals reviewed.      Assessment:       1. Chronic kidney disease, stage III (moderate)    2. Acute kidney injury (nontraumatic)        Plan:       Mr. Garibay is a 56 year old man with past medical history of hypertension, hypothyroidism,  hyperparathyroidism presenting for follow up of acute kidney injury and chronic kidney disease.  Patient creatinine has been variable for the past couple of years, down to 1.2mg/dL, up to 1.9mg/dL.  Suspect patient with baseline chronic kidney disease, however, with intermittent acute kidney injury due to inadequate fluid intake v. secondary to hypercalcemia v. due to prior NSAID use.  Creatinine elevated since recent procedure, will repeat renal panel to trend, encouraged fluid intake, advised to avoid any/all NSAID's, stressed importance of blood pressure control and weight loss to prevent any further progression of kidney disease, patient voiced understanding.    - Hypercalcemia: patient has follow up with Endocrinology to address likely recurrent primary hyperparathyroidism    Return to clinic in 3-4 months pending renal panel within a month, then with renal/heme panel, iron/TIBC/ferritin, urinalysis/culture, urine protein/creatinine ratio, PTH/VitD prior to next visit

## 2017-05-29 RX ORDER — METOPROLOL SUCCINATE 100 MG/1
TABLET, EXTENDED RELEASE ORAL
Qty: 30 TABLET | Refills: 0 | Status: SHIPPED | OUTPATIENT
Start: 2017-05-29

## 2017-08-21 ENCOUNTER — TELEPHONE (OUTPATIENT)
Dept: FAMILY MEDICINE | Facility: CLINIC | Age: 57
End: 2017-08-21

## 2017-08-21 NOTE — TELEPHONE ENCOUNTER
----- Message from Khanh Carcamo sent at 8/21/2017 10:11 AM CDT -----  Contact: Melanie from [facility]  X_ 1st Request  _ 2nd Request  _ 3rd Request    Who:BLANCA DAMON [[id number]]    Why: Melanie from [facility] states the patient would like a refill on predniSONE (DELTASONE) 20 MG tablet [facility] [address] [place] [address].    What Number to Call Back: [phone]    When to Expect a call back: (Before the end of the day)  -- if call after 3:00 call back will be tomorrow.

## 2017-08-21 NOTE — TELEPHONE ENCOUNTER
Patient was calling for a refill on prednisone patient said he had a flare with his gout but he is ok right now I advised patient should his gout flare up again he will need an OV.

## 2024-02-06 NOTE — PROGRESS NOTES
01/16/17 1336   Eating   Assistance Needed Independent   CARE Score 6   Oral Hygiene   Assistance Needed Independent   CARE Score 6   Toileting Hygiene   Assistance Needed Independent   CARE Score 6   Upper Body Dressing   Assistance Needed Independent   CARE Score 6   Lower Body Dressing   Assistance Needed Independent   CARE Score 6   Putting On/Taking Off Footwear   Assistance Needed Independent   CARE Score 6      No

## 2024-06-20 ENCOUNTER — APPOINTMENT (RX ONLY)
Dept: URBAN - METROPOLITAN AREA CLINIC 149 | Facility: CLINIC | Age: 64
Setting detail: DERMATOLOGY
End: 2024-06-20

## 2024-06-20 DIAGNOSIS — L81.4 OTHER MELANIN HYPERPIGMENTATION: ICD-10-CM

## 2024-06-20 DIAGNOSIS — D69.2 OTHER NONTHROMBOCYTOPENIC PURPURA: ICD-10-CM

## 2024-06-20 DIAGNOSIS — L82.1 OTHER SEBORRHEIC KERATOSIS: ICD-10-CM

## 2024-06-20 DIAGNOSIS — L57.8 OTHER SKIN CHANGES DUE TO CHRONIC EXPOSURE TO NONIONIZING RADIATION: ICD-10-CM | Status: WORSENING

## 2024-06-20 DIAGNOSIS — L57.0 ACTINIC KERATOSIS: ICD-10-CM

## 2024-06-20 PROCEDURE — ? OTC TREATMENT REGIMEN

## 2024-06-20 PROCEDURE — ? PATIENT SPECIFIC COUNSELING

## 2024-06-20 PROCEDURE — ? DIAGNOSIS COMMENT

## 2024-06-20 PROCEDURE — ? COUNSELING

## 2024-06-20 PROCEDURE — ? TREATMENT REGIMEN

## 2024-06-20 PROCEDURE — 99213 OFFICE O/P EST LOW 20 MIN: CPT
